# Patient Record
Sex: FEMALE | Race: WHITE | NOT HISPANIC OR LATINO | Employment: PART TIME | URBAN - METROPOLITAN AREA
[De-identification: names, ages, dates, MRNs, and addresses within clinical notes are randomized per-mention and may not be internally consistent; named-entity substitution may affect disease eponyms.]

---

## 2017-07-05 DIAGNOSIS — C50.919 BREAST CANCER (H): Primary | ICD-10-CM

## 2017-07-07 ENCOUNTER — MEDICAL CORRESPONDENCE (OUTPATIENT)
Dept: HEALTH INFORMATION MANAGEMENT | Facility: CLINIC | Age: 57
End: 2017-07-07

## 2017-07-07 ENCOUNTER — TRANSFERRED RECORDS (OUTPATIENT)
Dept: HEALTH INFORMATION MANAGEMENT | Facility: CLINIC | Age: 57
End: 2017-07-07

## 2017-07-10 ENCOUNTER — HOSPITAL ENCOUNTER (OUTPATIENT)
Dept: CARDIOLOGY | Facility: CLINIC | Age: 57
Discharge: HOME | End: 2017-07-10
Attending: INTERNAL MEDICINE | Admitting: INTERNAL MEDICINE
Payer: OTHER GOVERNMENT

## 2017-07-10 DIAGNOSIS — C50.919 BREAST CANCER (H): ICD-10-CM

## 2017-07-10 PROCEDURE — 40000264 ECHO COMPLETE WITH OPTISON

## 2017-07-10 PROCEDURE — 93306 TTE W/DOPPLER COMPLETE: CPT | Mod: 26 | Performed by: INTERNAL MEDICINE

## 2017-07-10 PROCEDURE — 25500064 ZZH RX 255 OP 636: Performed by: INTERNAL MEDICINE

## 2017-07-10 RX ADMIN — HUMAN ALBUMIN MICROSPHERES AND PERFLUTREN 3 ML: 10; .22 INJECTION, SOLUTION INTRAVENOUS at 10:45

## 2017-07-11 ENCOUNTER — HOSPITAL ENCOUNTER (OUTPATIENT)
Facility: CLINIC | Age: 57
Discharge: HOME | End: 2017-07-11
Attending: RADIOLOGY | Admitting: RADIOLOGY
Payer: OTHER GOVERNMENT

## 2017-07-11 ENCOUNTER — APPOINTMENT (OUTPATIENT)
Dept: INTERVENTIONAL RADIOLOGY/VASCULAR | Facility: CLINIC | Age: 57
End: 2017-07-11
Attending: INTERNAL MEDICINE
Payer: OTHER GOVERNMENT

## 2017-07-11 VITALS
OXYGEN SATURATION: 96 % | DIASTOLIC BLOOD PRESSURE: 71 MMHG | HEART RATE: 64 BPM | SYSTOLIC BLOOD PRESSURE: 135 MMHG | TEMPERATURE: 98.2 F | RESPIRATION RATE: 14 BRPM

## 2017-07-11 DIAGNOSIS — C80.1 CANCER (H): ICD-10-CM

## 2017-07-11 LAB
APTT PPP: 28 SEC (ref 22–37)
ERYTHROCYTE [DISTWIDTH] IN BLOOD BY AUTOMATED COUNT: 14.1 % (ref 10–15)
HCT VFR BLD AUTO: 36.1 % (ref 35–47)
HGB BLD-MCNC: 11.5 G/DL (ref 11.7–15.7)
INR PPP: 0.91 (ref 0.86–1.14)
MCH RBC QN AUTO: 27.3 PG (ref 26.5–33)
MCHC RBC AUTO-ENTMCNC: 31.9 G/DL (ref 31.5–36.5)
MCV RBC AUTO: 86 FL (ref 78–100)
PLATELET # BLD AUTO: 220 10E9/L (ref 150–450)
RBC # BLD AUTO: 4.22 10E12/L (ref 3.8–5.2)
WBC # BLD AUTO: 4.9 10E9/L (ref 4–11)

## 2017-07-11 PROCEDURE — 36415 COLL VENOUS BLD VENIPUNCTURE: CPT | Performed by: RADIOLOGY

## 2017-07-11 PROCEDURE — 99153 MOD SED SAME PHYS/QHP EA: CPT

## 2017-07-11 PROCEDURE — 76937 US GUIDE VASCULAR ACCESS: CPT

## 2017-07-11 PROCEDURE — 25000128 H RX IP 250 OP 636: Performed by: RADIOLOGY

## 2017-07-11 PROCEDURE — 99152 MOD SED SAME PHYS/QHP 5/>YRS: CPT

## 2017-07-11 PROCEDURE — 85027 COMPLETE CBC AUTOMATED: CPT | Performed by: RADIOLOGY

## 2017-07-11 PROCEDURE — 25000128 H RX IP 250 OP 636

## 2017-07-11 PROCEDURE — C1788 PORT, INDWELLING, IMP: HCPCS

## 2017-07-11 PROCEDURE — 85730 THROMBOPLASTIN TIME PARTIAL: CPT | Performed by: RADIOLOGY

## 2017-07-11 PROCEDURE — 85610 PROTHROMBIN TIME: CPT | Performed by: RADIOLOGY

## 2017-07-11 PROCEDURE — 36561 INSERT TUNNELED CV CATH: CPT

## 2017-07-11 PROCEDURE — 27210820 ZZH WOUND GLUE CR3

## 2017-07-11 PROCEDURE — 77001 FLUOROGUIDE FOR VEIN DEVICE: CPT

## 2017-07-11 PROCEDURE — 27210908 ZZH NEEDLE CR4

## 2017-07-11 PROCEDURE — 27210904 ZZH KIT CR6

## 2017-07-11 RX ORDER — FENTANYL CITRATE 50 UG/ML
INJECTION, SOLUTION INTRAMUSCULAR; INTRAVENOUS
Status: DISCONTINUED
Start: 2017-07-11 | End: 2017-07-11 | Stop reason: HOSPADM

## 2017-07-11 RX ORDER — CEFAZOLIN SODIUM 2 G/100ML
2 INJECTION, SOLUTION INTRAVENOUS
Status: COMPLETED | OUTPATIENT
Start: 2017-07-11 | End: 2017-07-11

## 2017-07-11 RX ORDER — CEFAZOLIN SODIUM 2 G/100ML
INJECTION, SOLUTION INTRAVENOUS
Status: DISCONTINUED
Start: 2017-07-11 | End: 2017-07-11 | Stop reason: HOSPADM

## 2017-07-11 RX ORDER — HEPARIN SODIUM 1000 [USP'U]/ML
INJECTION, SOLUTION INTRAVENOUS; SUBCUTANEOUS
Status: COMPLETED
Start: 2017-07-11 | End: 2017-07-11

## 2017-07-11 RX ORDER — LIDOCAINE HYDROCHLORIDE 10 MG/ML
1-30 INJECTION, SOLUTION EPIDURAL; INFILTRATION; INTRACAUDAL; PERINEURAL
Status: COMPLETED | OUTPATIENT
Start: 2017-07-11 | End: 2017-07-11

## 2017-07-11 RX ORDER — FLUMAZENIL 0.1 MG/ML
0.2 INJECTION, SOLUTION INTRAVENOUS
Status: DISCONTINUED | OUTPATIENT
Start: 2017-07-11 | End: 2017-07-11 | Stop reason: HOSPADM

## 2017-07-11 RX ORDER — FENTANYL CITRATE 50 UG/ML
25-50 INJECTION, SOLUTION INTRAMUSCULAR; INTRAVENOUS EVERY 5 MIN PRN
Status: DISCONTINUED | OUTPATIENT
Start: 2017-07-11 | End: 2017-07-11 | Stop reason: HOSPADM

## 2017-07-11 RX ORDER — LIDOCAINE 40 MG/G
CREAM TOPICAL
Status: DISCONTINUED | OUTPATIENT
Start: 2017-07-11 | End: 2017-07-11 | Stop reason: HOSPADM

## 2017-07-11 RX ORDER — NALOXONE HYDROCHLORIDE 0.4 MG/ML
.1-.4 INJECTION, SOLUTION INTRAMUSCULAR; INTRAVENOUS; SUBCUTANEOUS
Status: DISCONTINUED | OUTPATIENT
Start: 2017-07-11 | End: 2017-07-11 | Stop reason: HOSPADM

## 2017-07-11 RX ADMIN — FENTANYL CITRATE 100 MCG: 50 INJECTION INTRAMUSCULAR; INTRAVENOUS at 10:10

## 2017-07-11 RX ADMIN — MIDAZOLAM 2 MG: 1 INJECTION INTRAMUSCULAR; INTRAVENOUS at 10:08

## 2017-07-11 RX ADMIN — HEPARIN SODIUM 2000 UNITS: 1000 INJECTION, SOLUTION INTRAVENOUS; SUBCUTANEOUS at 10:16

## 2017-07-11 RX ADMIN — LIDOCAINE HYDROCHLORIDE 200 MG: 10 INJECTION, SOLUTION EPIDURAL; INFILTRATION; INTRACAUDAL; PERINEURAL at 10:10

## 2017-07-11 RX ADMIN — CEFAZOLIN SODIUM 2 G: 2 INJECTION, SOLUTION INTRAVENOUS at 09:43

## 2017-07-11 NOTE — PROCEDURES
RADIOLOGY PROCEDURE NOTE  Patient name: Cheryl Patel  MRN: [id number]  : 1960    Pre-procedure diagnosis: Breast cancer, Right.  Post-procedure diagnosis: Same    Procedure Date/Time: 2017  10:44 AM  Procedure: Right IJ port and catheter placement, tip at RA/SVC junction.  Heparinized.  Ready for immediate use.  No complications.  Estimated blood loss: 5 ml  Specimen(s) collected with description: None  The patient tolerated the procedure well with no immediate complications.  Significant findings:   Please see above.    See imaging dictation for procedural details.    Provider name: Beltran Pittman  Assistant(s):None

## 2017-07-11 NOTE — IP AVS SNAPSHOT
MRN:[id number]                      After Visit Summary   7/11/2017    Cheryl Patel    MRN: [id number]           Visit Information        Department      7/11/2017  8:32 AM [facility]          Review of your medicines      UNREVIEWED medicines. Ask your doctor about these medicines        Dose / Directions    LEXAPRO PO        Dose:  20 mg   Take 20 mg by mouth   Refills:  0       LIPITOR PO        Dose:  20 mg   Take 20 mg by mouth   Refills:  0       LISINOPRIL PO        Dose:  20 mg   Take 20 mg by mouth   Refills:  0       METFORMIN HCL PO        Dose:  1000 mg   Take 1,000 mg by mouth 2 times daily (with meals)   Refills:  0                Protect others around you: Learn how to safely use, store and throw away your medicines at www.disposemymeds.org.         Follow-ups after your visit        Your next 10 appointments already scheduled     Jul 14, 2017 12:45 PM CDT   Cardio-Oncology New with Kareem Swenson MD   [facility] PHYSICIANS [facility] [place] [facility] [facility]    [address] [place] [id number] [phone]               Care Instructions        Further instructions from your care team         Going Home after Your Port Is Inserted  _______________________________________    Patient Name: Cheryl Patel  Today's Date: July 11, 2017    The doctor who inserted your port was: Dr. Pittman at [facility]     Have your port site and/or neck wound checked on:  Next clinic appointment     Your port may be accessed right away. A nurse needs to flush your port every 30 days or after each use.     When you get home:    You should have an adult with you for the first 6 hours.    No driving or drinking alcohol until tomorrow. You may still have side effects from the medicine you received today (you may feel drowsy, unsteady or forgetful).     You may go back to your regular diet today.     If you take aspirin or  "Plavix, you may begin taking it again tomorrow. You may restart all other medicines today. Use pain medicine as directed.    Avoid heavy lifting or the overuse of your shoulder for three days.    Caring for the port site and/or neck wound:    Keep the port site clean and dry. Cover the site with plastic before taking a shower. Do this until the site has healed.     Keep the bandage on your port site for three days. Change it if it gets wet or dirty. After three days, you may use Band-Aids until the wound has healed.    For a neck wound, leave the tape on for three days. You may cover it with a Band-Aid for comfort.     If you have oozing or bleeding from the port site or from the cut in your neck:     Put direct pressure on the wound for 5 to 10 minutes with a gauze pad.  If you still have bleeding after 10 minutes, call your doctor.    If you are bleeding a lot and can't control it with direct pressure, call 911.    Call your doctor at  Oncology clinic if you have:    Bleeding from a wound after 10 minutes of direct pressure.    Swelling in your neck or over your port site.    Signs of infection: a fever over 100 F (37.8 C) under the tongue; the site is red, tender or draining.       Additional Information About Your Visit        TrackerSphere[facility]ier Information     FOBO lets you send messages to your doctor, view your test results, renew your prescriptions, schedule appointments and more. To sign up, go to [email or web address] . Click on \"Log in\" on the left side of the screen, which will take you to the Welcome page. Then click on \"Sign up Now\" on the right side of the page.     You will be asked to enter the access code listed below, as well as some personal information. Please follow the directions to create your username and password.     Your access code is: [email or web address]  Expires: 10/9/2017 10:58 AM     Your access code will  in 90 days. If you need help or a new code, please call your [facility] clinic or " [phone].        Care EveryWhere ID     This is your Care EveryWhere ID. This could be used by other organizations to access your [facility] medical records  [id number]        Your Vitals Were     Blood Pressure Pulse Temperature Respirations Pulse Oximetry       128/78 (BP Location: Left arm) 62 98.2  F (36.8  C) (Temporal) 15 95%        Primary Care Provider Office Phone # Fax #    Ina Pinzon [phone]      Equal Access to Services     [facility] : Hadii aad ku hadasho Soomaali, waaxda luqadaha, qaybta kaalmada adeegyada, migdalia gaffney . So [facility] [phone].    ATENCIÓN: Si habla español, tiene a zaidi disposición servicios gratuitos de asistencia lingüística. Llame al [phone].    We comply with applicable federal civil rights laws and [place] laws. We do not discriminate on the basis of race, color, national origin, age, disability sex, sexual orientation or gender identity.            Thank you!     Thank you for choosing [facility] for your care. Our goal is always to provide you with excellent care. Hearing back from our patients is one way we can continue to improve our services. Please take a few minutes to complete the written survey that you may receive in the mail after you visit. If you would like to speak to someone directly about your visit please contact Patient Relations at [phone]. Thank you!               Medication List: This is a list of all your medications and when to take them. Check marks below indicate your daily home schedule. Keep this list as a reference.      Medications           Morning Afternoon Evening Bedtime As Needed    LEXAPRO PO   Take 20 mg by mouth                                LIPITOR PO   Take 20 mg by mouth                                LISINOPRIL PO   Take 20 mg by mouth                                METFORMIN HCL PO   Take 1,000 mg by mouth 2 times daily (with meals)

## 2017-07-11 NOTE — IP AVS SNAPSHOT
[facility]    [address]    [facility] [place] [address]    Phone:  [phone]                                       After Visit Summary   7/11/2017    Cheryl Patel    MRN: [id number]           After Visit Summary Signature Page     I have received my discharge instructions, and my questions have been answered. I have discussed any challenges I see with this plan with the nurse or doctor.    ..........................................................................................................................................  Patient/Patient Representative Signature      ..........................................................................................................................................  Patient Representative Print Name and Relationship to Patient    ..................................................               ................................................  Date                                            Time    ..........................................................................................................................................  Reviewed by Signature/Title    ...................................................              ..............................................  Date                                                            Time

## 2017-07-11 NOTE — DISCHARGE INSTRUCTIONS
Going Home after Your Port Is Inserted  _______________________________________    Patient Name: Cheryl Patel  Today's Date: July 11, 2017    The doctor who inserted your port was: Dr. Pittman at [facility]     Have your port site and/or neck wound checked on:  Next clinic appointment     Your port may be accessed right away. A nurse needs to flush your port every 30 days or after each use.     When you get home:    You should have an adult with you for the first 6 hours.    No driving or drinking alcohol until tomorrow. You may still have side effects from the medicine you received today (you may feel drowsy, unsteady or forgetful).     You may go back to your regular diet today.     If you take aspirin or Plavix, you may begin taking it again tomorrow. You may restart all other medicines today. Use pain medicine as directed.    Avoid heavy lifting or the overuse of your shoulder for three days.    Caring for the port site and/or neck wound:    Keep the port site clean and dry. Cover the site with plastic before taking a shower. Do this until the site has healed.     Keep the bandage on your port site for three days. Change it if it gets wet or dirty. After three days, you may use Band-Aids until the wound has healed.    For a neck wound, leave the tape on for three days. You may cover it with a Band-Aid for comfort.     If you have oozing or bleeding from the port site or from the cut in your neck:     Put direct pressure on the wound for 5 to 10 minutes with a gauze pad.  If you still have bleeding after 10 minutes, call your doctor.    If you are bleeding a lot and can't control it with direct pressure, call 911.    Call your doctor at  Oncology clinic if you have:    Bleeding from a wound after 10 minutes of direct pressure.    Swelling in your neck or over your port site.    Signs of infection: a fever over 100 F (37.8 C) under the tongue; the site is red, tender or draining.

## 2017-07-12 ENCOUNTER — TRANSFERRED RECORDS (OUTPATIENT)
Dept: HEALTH INFORMATION MANAGEMENT | Facility: CLINIC | Age: 57
End: 2017-07-12

## 2017-07-14 ENCOUNTER — OFFICE VISIT (OUTPATIENT)
Dept: CARDIOLOGY | Facility: CLINIC | Age: 57
End: 2017-07-14
Payer: OTHER GOVERNMENT

## 2017-07-14 VITALS
HEIGHT: 67 IN | DIASTOLIC BLOOD PRESSURE: 76 MMHG | OXYGEN SATURATION: 99 % | HEART RATE: 78 BPM | WEIGHT: 165 LBS | SYSTOLIC BLOOD PRESSURE: 138 MMHG | BODY MASS INDEX: 25.9 KG/M2

## 2017-07-14 DIAGNOSIS — Z92.21 STATUS POST CHEMOTHERAPY: Primary | ICD-10-CM

## 2017-07-14 DIAGNOSIS — E78.5 HYPERLIPIDEMIA LDL GOAL <100: ICD-10-CM

## 2017-07-14 DIAGNOSIS — F33.0 MILD RECURRENT MAJOR DEPRESSION (H): ICD-10-CM

## 2017-07-14 DIAGNOSIS — I10 BENIGN ESSENTIAL HYPERTENSION: ICD-10-CM

## 2017-07-14 PROCEDURE — 99203 OFFICE O/P NEW LOW 30 MIN: CPT | Performed by: INTERNAL MEDICINE

## 2017-07-14 RX ORDER — ATORVASTATIN CALCIUM 20 MG/1
20 TABLET, FILM COATED ORAL DAILY
Qty: 90 TABLET | Refills: 3 | Status: SHIPPED | OUTPATIENT
Start: 2017-07-14 | End: 2018-04-30

## 2017-07-14 RX ORDER — ESCITALOPRAM OXALATE 20 MG/1
20 TABLET ORAL DAILY
Qty: 90 TABLET | Refills: 3 | Status: SHIPPED | OUTPATIENT
Start: 2017-07-14 | End: 2018-01-08

## 2017-07-14 RX ORDER — LISINOPRIL 20 MG/1
20 TABLET ORAL DAILY
Qty: 90 TABLET | Refills: 3 | Status: ON HOLD | OUTPATIENT
Start: 2017-07-14 | End: 2017-07-27

## 2017-07-14 NOTE — LETTER
"7/14/2017    Ina Pinzon MD  [facility]   [address]  [facility] [address]    RE: Cheryl Patel       Dear Colleague,    I had the pleasure of seeing Cheryl Patel in the [facility].    Cardiology Consultation      Cheryl Patel MRN# [id number]   YOB: 1960 Age: 56 year old   Date of Visit 07/14/2017     Reason for consult:  breast cancer, cardio oncology            Assessment and Plan:     1. Cardio oncology, Adriamycin planned    Discussed the rationale for cardioprotection.  Patient is already on atorvastatin.  Refilled this medication for her.    Will check echocardiogram in one month.  Continue on current medications.  Discussed warning signs and symptoms of cardiomyopathy.      This note was transcribed using electronic voice recognition software, typographical errors may be present.                Chief Complaint:   New Patient           History of Present Illness:   This patient is a very pleasant 56 year old female patient of Dr. Ina Pinzon who has just moved from [place] to have access to better care for her breast cancer.  She had mastectomy in [place] and is going to get her chemotherapy including Adriamycin for four cycles every other week before going to Taxol therapy.  Her prechemotherapy echocardiogram is normal.  She does have diabetes but denies any exertional chest discomfort or dyspnea on exertion.  She did have positive lymph nodes on her axillary dissection.  She has depression but feels like she is coping pretty well.  Her  works for [employer] and they have been very good about allowing him flexibility and moving his work from [place] back to [place].           Physical Exam:     Vitals: /76  Pulse 78  Ht 1.702 m (5' 7\")  Wt 74.8 kg (165 lb)  SpO2 99%  BMI 25.84 kg/m2  Constitutional:  cooperative, alert and oriented, well developed, well nourished, in no acute distress    "     Skin:  warm and dry to the touch, no apparent skin lesions or masses noted        Head:  normocephalic, no masses or lesions        Eyes:  pupils equal and round, conjunctivae and lids unremarkable, sclera white, no xanthalasma, EOMS intact, no nystagmus        ENT:  no pallor or cyanosis, dentition good        Neck:  JVP normal        Chest:  normal breath sounds, clear to auscultation, normal A-P diameter, normal symmetry, normal respiratory excursion, no use of accessory muscles        Cardiac: regular rhythm;normal S1 and S2;no murmurs, gallops or rubs detected                  Abdomen:      benign    Extremities and Back:  no deformities, clubbing, cyanosis, erythema observed;no edema        Neurological:  affect appropriate, oriented to time, person and place;no gross motor deficits                    Past Medical History:   I have reviewed this patient's past medical history  Past Medical History:   Diagnosis Date     Cancer (H)      Diabetes (H)              Past Surgical History:   I have reviewed this patient's past surgical history  Past Surgical History:   Procedure Laterality Date     MASTECTOMY                 Social History:   I have reviewed this patient's social history  Social History   Substance Use Topics     Smoking status: Former Smoker     Smokeless tobacco: Not on file     Alcohol use Yes      Comment: socially             Family History:   I have reviewed this patient's family history  Family History   Problem Relation Age of Onset     Breast Cancer No family hx of              Allergies:   No Known Allergies          Medications:   I have reviewed this patient's current medications  Current Outpatient Prescriptions   Medication Sig Dispense Refill     atorvastatin (LIPITOR) 20 MG tablet Take 1 tablet (20 mg) by mouth daily 90 tablet 3     escitalopram (LEXAPRO) 20 MG tablet Take 1 tablet (20 mg) by mouth daily 90 tablet 3     lisinopril (PRINIVIL/ZESTRIL) 20 MG tablet Take 1 tablet (20  mg) by mouth daily 90 tablet 3     METFORMIN HCL PO Take 1,000 mg by mouth 2 times daily (with meals)       [DISCONTINUED] LISINOPRIL PO Take 20 mg by mouth       [DISCONTINUED] Atorvastatin Calcium (LIPITOR PO) Take 20 mg by mouth       [DISCONTINUED] Escitalopram Oxalate (LEXAPRO PO) Take 20 mg by mouth                 Review of Systems:     Review of Systems:  Skin:  Negative     Eyes:  Negative    ENT:  Negative    Respiratory:  Negative    Cardiovascular:  Negative    Gastroenterology: Negative    Genitourinary:  Negative    Musculoskeletal:  Negative    Neurologic:  Negative    Psychiatric:  Positive for excessive stress  Heme/Lymph/Imm:  Negative    Endocrine:  Positive for diabetes                     Data:   All laboratory data reviewed  No results found for: CHOL  No results found for: HDL  No results found for: LDL  No results found for: TRIG  No results found for: CHOLHDLRATIO  No results found for: TSH  Last Basic Metabolic Panel:  No results found for: NA   No results found for: POTASSIUM  No results found for: CHLORIDE  No results found for: PRO  No results found for: CO2  No results found for: BUN  No results found for: CR  No results found for: GLC  Lab Results   Component Value Date    WBC 4.9 07/11/2017     Lab Results   Component Value Date    RBC 4.22 07/11/2017     Lab Results   Component Value Date    HGB 11.5 07/11/2017     Lab Results   Component Value Date    HCT 36.1 07/11/2017     Lab Results   Component Value Date    MCV 86 07/11/2017     Lab Results   Component Value Date    MCH 27.3 07/11/2017     Lab Results   Component Value Date    MCHC 31.9 07/11/2017     Lab Results   Component Value Date    RDW 14.1 07/11/2017     Lab Results   Component Value Date     07/11/2017     Thank you for allowing me to participate in the care of your patient.    Sincerely,     Kareem Swenson MD     [facility]

## 2017-07-14 NOTE — PROGRESS NOTES
"Cardiology Consultation      Cheryl Patel MRN# [id number]   YOB: 1960 Age: 56 year old   Date of Visit 07/14/2017     Reason for consult:  breast cancer, cardio oncology            Assessment and Plan:     1. Cardio oncology, Adriamycin planned    Discussed the rationale for cardioprotection.  Patient is already on atorvastatin.  Refilled this medication for her.    Will check echocardiogram in one month.  Continue on current medications.  Discussed warning signs and symptoms of cardiomyopathy.      This note was transcribed using electronic voice recognition software, typographical errors may be present.                Chief Complaint:   New Patient           History of Present Illness:   This patient is a very pleasant 56 year old female patient of Dr. Ina Pinzon who has just moved from [place] to have access to better care for her breast cancer.  She had mastectomy in [place] and is going to get her chemotherapy including Adriamycin for four cycles every other week before going to Taxol therapy.  Her prechemotherapy echocardiogram is normal.  She does have diabetes but denies any exertional chest discomfort or dyspnea on exertion.  She did have positive lymph nodes on her axillary dissection.  She has depression but feels like she is coping pretty well.  Her  works for [employer] and they have been very good about allowing him flexibility and moving his work from [place] back to [place].           Physical Exam:     Vitals: /76  Pulse 78  Ht 1.702 m (5' 7\")  Wt 74.8 kg (165 lb)  SpO2 99%  BMI 25.84 kg/m2  Constitutional:  cooperative, alert and oriented, well developed, well nourished, in no acute distress        Skin:  warm and dry to the touch, no apparent skin lesions or masses noted        Head:  normocephalic, no masses or lesions        Eyes:  pupils equal and round, conjunctivae and lids unremarkable, sclera white, no xanthalasma, EOMS intact, no nystagmus    "     ENT:  no pallor or cyanosis, dentition good        Neck:  JVP normal        Chest:  normal breath sounds, clear to auscultation, normal A-P diameter, normal symmetry, normal respiratory excursion, no use of accessory muscles        Cardiac: regular rhythm;normal S1 and S2;no murmurs, gallops or rubs detected                  Abdomen:      benign    Extremities and Back:  no deformities, clubbing, cyanosis, erythema observed;no edema        Neurological:  affect appropriate, oriented to time, person and place;no gross motor deficits                    Past Medical History:   I have reviewed this patient's past medical history  Past Medical History:   Diagnosis Date     Cancer (H)      Diabetes (H)              Past Surgical History:   I have reviewed this patient's past surgical history  Past Surgical History:   Procedure Laterality Date     MASTECTOMY                 Social History:   I have reviewed this patient's social history  Social History   Substance Use Topics     Smoking status: Former Smoker     Smokeless tobacco: Not on file     Alcohol use Yes      Comment: socially             Family History:   I have reviewed this patient's family history  Family History   Problem Relation Age of Onset     Breast Cancer No family hx of              Allergies:   No Known Allergies          Medications:   I have reviewed this patient's current medications  Current Outpatient Prescriptions   Medication Sig Dispense Refill     atorvastatin (LIPITOR) 20 MG tablet Take 1 tablet (20 mg) by mouth daily 90 tablet 3     escitalopram (LEXAPRO) 20 MG tablet Take 1 tablet (20 mg) by mouth daily 90 tablet 3     lisinopril (PRINIVIL/ZESTRIL) 20 MG tablet Take 1 tablet (20 mg) by mouth daily 90 tablet 3     METFORMIN HCL PO Take 1,000 mg by mouth 2 times daily (with meals)       [DISCONTINUED] LISINOPRIL PO Take 20 mg by mouth       [DISCONTINUED] Atorvastatin Calcium (LIPITOR PO) Take 20 mg by mouth       [DISCONTINUED]  Escitalopram Oxalate (LEXAPRO PO) Take 20 mg by mouth                 Review of Systems:     Review of Systems:  Skin:  Negative     Eyes:  Negative    ENT:  Negative    Respiratory:  Negative    Cardiovascular:  Negative    Gastroenterology: Negative    Genitourinary:  Negative    Musculoskeletal:  Negative    Neurologic:  Negative    Psychiatric:  Positive for excessive stress  Heme/Lymph/Imm:  Negative    Endocrine:  Positive for diabetes                     Data:   All laboratory data reviewed  No results found for: CHOL  No results found for: HDL  No results found for: LDL  No results found for: TRIG  No results found for: CHOLHDLRATIO  No results found for: TSH  Last Basic Metabolic Panel:  No results found for: NA   No results found for: POTASSIUM  No results found for: CHLORIDE  No results found for: PRO  No results found for: CO2  No results found for: BUN  No results found for: CR  No results found for: GLC  Lab Results   Component Value Date    WBC 4.9 07/11/2017     Lab Results   Component Value Date    RBC 4.22 07/11/2017     Lab Results   Component Value Date    HGB 11.5 07/11/2017     Lab Results   Component Value Date    HCT 36.1 07/11/2017     Lab Results   Component Value Date    MCV 86 07/11/2017     Lab Results   Component Value Date    MCH 27.3 07/11/2017     Lab Results   Component Value Date    MCHC 31.9 07/11/2017     Lab Results   Component Value Date    RDW 14.1 07/11/2017     Lab Results   Component Value Date     07/11/2017

## 2017-07-14 NOTE — PATIENT INSTRUCTIONS
We will check another echocardiogram down in [place] in one month to see how the heart is handling the ADRIAMYCIN. Please stay on your current medications including the ATORVASTATIN.     Things to watch for: Shortness of breath, swelling in the ankles, difficulty breathing when lying flat and chest tightness when walking.

## 2017-07-14 NOTE — MR AVS SNAPSHOT
After Visit Summary   7/14/2017    Cheryl Patel    MRN: [id number]           Patient Information     Date Of Birth          1960        Visit Information        Provider Department      7/14/2017 12:45 PM Kareem Swenson MD [facility]        Today's Diagnoses     Status post chemotherapy    -  1    Hyperlipidemia LDL goal <100        Mild recurrent major depression (H)        Benign essential hypertension          Care Instructions    We will check another echocardiogram down in [place] in one month to see how the heart is handling the ADRIAMYCIN. Please stay on your current medications including the ATORVASTATIN.     Things to watch for: Shortness of breath, swelling in the ankles, difficulty breathing when lying flat and chest tightness when walking.          Follow-ups after your visit        Your next 10 appointments already scheduled     Jul 17, 2017  7:00 AM CDT   SHORT with Archana Chen MD   [facility]    [address]  [facility] [address]   [phone]              Future tests that were ordered for you today     Open Future Orders        Priority Expected Expires Ordered    Echocardiogram Limited Routine 7/21/2017 7/14/2018 7/14/2017            Who to contact     If you have questions or need follow up information about today's clinic visit or your schedule please contact [facility] directly at [phone].  Normal or non-critical lab and imaging results will be communicated to you by MyChart, letter or phone within 4 business days after the clinic has received the results. If you do not hear from us within 7 days, please contact the clinic through MyChart or phone. If you have a critical or abnormal lab result, we will notify you by phone as soon as possible.  Submit refill requests through [employer] or call your  "pharmacy and they will forward the refill request to us. Please allow 3 business days for your refill to be completed.          Additional Information About Your Visit        MyChart Information     [employer] lets you send messages to your doctor, view your test results, renew your prescriptions, schedule appointments and more. To sign up, go to [email or web address] . Click on \"Log in\" on the left side of the screen, which will take you to the Welcome page. Then click on \"Sign up Now\" on the right side of the page.     You will be asked to enter the access code listed below, as well as some personal information. Please follow the directions to create your username and password.     Your access code is: [email or web address]  Expires: 10/9/2017 10:58 AM     Your access code will  in 90 days. If you need help or a new code, please call your [place] clinic or [phone].        Care EveryWhere ID     This is your Care EveryWhere ID. This could be used by other organizations to access your [place] medical records  [id number]        Your Vitals Were     Pulse Height Pulse Oximetry BMI (Body Mass Index)          78 1.702 m (5' 7\") 99% 25.84 kg/m2         Blood Pressure from Last 3 Encounters:   17 138/76   17 135/71    Weight from Last 3 Encounters:   17 74.8 kg (165 lb)                 Today's Medication Changes          These changes are accurate as of: 17 12:58 PM.  If you have any questions, ask your nurse or doctor.               These medicines have changed or have updated prescriptions.        Dose/Directions    atorvastatin 20 MG tablet   Commonly known as:  LIPITOR   This may have changed:    - medication strength  - when to take this   Used for:  Hyperlipidemia LDL goal <100   Changed by:  Kareem Swenson MD        Dose:  20 mg   Take 1 tablet (20 mg) by mouth daily   Quantity:  90 tablet   Refills:  3       escitalopram 20 MG tablet   Commonly known as:  LEXAPRO   This may have " changed:    - medication strength  - when to take this   Used for:  Mild recurrent major depression (H)   Changed by:  Kareem Swenson MD        Dose:  20 mg   Take 1 tablet (20 mg) by mouth daily   Quantity:  90 tablet   Refills:  3       lisinopril 20 MG tablet   Commonly known as:  PRINIVIL/ZESTRIL   This may have changed:    - medication strength  - when to take this   Used for:  Benign essential hypertension   Changed by:  Kareem Swenson MD        Dose:  20 mg   Take 1 tablet (20 mg) by mouth daily   Quantity:  90 tablet   Refills:  3            Where to get your medicines      These medications were sent to [facility] [place] [id number] [place] [address]  [address] [place] [address]     Phone:  [phone]     atorvastatin 20 MG tablet    escitalopram 20 MG tablet    lisinopril 20 MG tablet                Primary Care Provider Office Phone # Fax #    Ina Pinzon [phone]       [facility]  [address] [place] [address]        Equal Access to Services     [facility]: Hadii aad ku hadasho Soomaali, waaxda luqadaha, qaybta kaalmada adeegyada, waxay idiin hayaan bandar gaffney . So [facility] [phone].    ATENCIÓN: Si habla español, tiene a zaidi disposición servicios gratuitos de asistencia lingüística. Llame al [phone].    We comply with applicable federal civil rights laws and [place] laws. We do not discriminate on the basis of race, color, national origin, age, disability sex, sexual orientation or gender identity.            Thank you!     Thank you for choosing [facility] [place]  for your care. Our goal is always to provide you with excellent care. Hearing back from our patients is one way we can continue to improve our services. Please take a few minutes to complete the written survey that you may receive in the mail after your visit with us. Thank you!              Your Updated Medication List - Protect others around you: Learn how to safely use, store and throw away your medicines at www.disposemymeds.org.          This list is accurate as of: 7/14/17 12:58 PM.  Always use your most recent med list.                   Brand Name Dispense Instructions for use Diagnosis    atorvastatin 20 MG tablet    LIPITOR    90 tablet    Take 1 tablet (20 mg) by mouth daily    Hyperlipidemia LDL goal <100       escitalopram 20 MG tablet    LEXAPRO    90 tablet    Take 1 tablet (20 mg) by mouth daily    Mild recurrent major depression (H)       lisinopril 20 MG tablet    PRINIVIL/ZESTRIL    90 tablet    Take 1 tablet (20 mg) by mouth daily    Benign essential hypertension       METFORMIN HCL PO      Take 1,000 mg by mouth 2 times daily (with meals)

## 2017-07-18 ENCOUNTER — TRANSFERRED RECORDS (OUTPATIENT)
Dept: HEALTH INFORMATION MANAGEMENT | Facility: CLINIC | Age: 57
End: 2017-07-18

## 2017-07-25 ENCOUNTER — HOSPITAL ENCOUNTER (INPATIENT)
Facility: CLINIC | Age: 57
LOS: 2 days | Discharge: HOME | DRG: 810 | End: 2017-07-27
Attending: EMERGENCY MEDICINE | Admitting: INTERNAL MEDICINE
Payer: OTHER GOVERNMENT

## 2017-07-25 ENCOUNTER — TRANSFERRED RECORDS (OUTPATIENT)
Dept: HEALTH INFORMATION MANAGEMENT | Facility: CLINIC | Age: 57
End: 2017-07-25

## 2017-07-25 ENCOUNTER — APPOINTMENT (OUTPATIENT)
Dept: GENERAL RADIOLOGY | Facility: CLINIC | Age: 57
DRG: 810 | End: 2017-07-25
Attending: EMERGENCY MEDICINE
Payer: OTHER GOVERNMENT

## 2017-07-25 DIAGNOSIS — R50.81 NEUTROPENIC FEVER (H): ICD-10-CM

## 2017-07-25 DIAGNOSIS — D70.9 NEUTROPENIC FEVER (H): ICD-10-CM

## 2017-07-25 DIAGNOSIS — I10 BENIGN ESSENTIAL HYPERTENSION: ICD-10-CM

## 2017-07-25 LAB
ALBUMIN SERPL-MCNC: 3.2 G/DL (ref 3.4–5)
ALBUMIN UR-MCNC: NEGATIVE MG/DL
ALP SERPL-CCNC: 113 U/L (ref 40–150)
ALT SERPL W P-5'-P-CCNC: 21 U/L (ref 0–50)
ANION GAP SERPL CALCULATED.3IONS-SCNC: 7 MMOL/L (ref 3–14)
APPEARANCE UR: CLEAR
AST SERPL W P-5'-P-CCNC: 9 U/L (ref 0–45)
BASOPHILS # BLD AUTO: 0.1 10E9/L (ref 0–0.2)
BASOPHILS NFR BLD AUTO: 6 %
BILIRUB SERPL-MCNC: 0.4 MG/DL (ref 0.2–1.3)
BILIRUB UR QL STRIP: NEGATIVE
BUN SERPL-MCNC: 8 MG/DL (ref 7–30)
CALCIUM SERPL-MCNC: 9.2 MG/DL (ref 8.5–10.1)
CHLORIDE SERPL-SCNC: 104 MMOL/L (ref 94–109)
CO2 SERPL-SCNC: 27 MMOL/L (ref 20–32)
COLOR UR AUTO: YELLOW
CREAT SERPL-MCNC: 0.56 MG/DL (ref 0.52–1.04)
DEPRECATED S PYO AG THROAT QL EIA: NORMAL
DIFFERENTIAL METHOD BLD: ABNORMAL
EOSINOPHIL # BLD AUTO: 0.1 10E9/L (ref 0–0.7)
EOSINOPHIL NFR BLD AUTO: 8 %
ERYTHROCYTE [DISTWIDTH] IN BLOOD BY AUTOMATED COUNT: 13.3 % (ref 10–15)
GFR SERPL CREATININE-BSD FRML MDRD: ABNORMAL ML/MIN/1.7M2
GLUCOSE BLDC GLUCOMTR-MCNC: 135 MG/DL (ref 70–99)
GLUCOSE SERPL-MCNC: 140 MG/DL (ref 70–99)
GLUCOSE UR STRIP-MCNC: NEGATIVE MG/DL
HCT VFR BLD AUTO: 33.6 % (ref 35–47)
HGB BLD-MCNC: 10.7 G/DL (ref 11.7–15.7)
HGB UR QL STRIP: NEGATIVE
KETONES UR STRIP-MCNC: NEGATIVE MG/DL
LACTATE BLD-SCNC: 2 MMOL/L (ref 0.7–2.1)
LEUKOCYTE ESTERASE UR QL STRIP: NEGATIVE
LYMPHOCYTES # BLD AUTO: 0.7 10E9/L (ref 0.8–5.3)
LYMPHOCYTES NFR BLD AUTO: 52 %
MCH RBC QN AUTO: 27 PG (ref 26.5–33)
MCHC RBC AUTO-ENTMCNC: 31.8 G/DL (ref 31.5–36.5)
MCV RBC AUTO: 85 FL (ref 78–100)
MICRO REPORT STATUS: NORMAL
MONOCYTES # BLD AUTO: 0.3 10E9/L (ref 0–1.3)
MONOCYTES NFR BLD AUTO: 18 %
MUCOUS THREADS #/AREA URNS LPF: PRESENT /LPF
MYELOCYTES # BLD: 0 10E9/L
MYELOCYTES NFR BLD MANUAL: 1 %
NEUTROPHILS # BLD AUTO: 0.2 10E9/L (ref 1.6–8.3)
NEUTROPHILS NFR BLD AUTO: 15 %
NITRATE UR QL: NEGATIVE
PH UR STRIP: 7 PH (ref 5–7)
PLATELET # BLD AUTO: 160 10E9/L (ref 150–450)
PLATELET # BLD EST: NORMAL 10*3/UL
POTASSIUM SERPL-SCNC: 3.9 MMOL/L (ref 3.4–5.3)
PROT SERPL-MCNC: 6.8 G/DL (ref 6.8–8.8)
RBC # BLD AUTO: 3.96 10E12/L (ref 3.8–5.2)
RBC #/AREA URNS AUTO: 1 /HPF (ref 0–2)
RBC MORPH BLD: ABNORMAL
SODIUM SERPL-SCNC: 138 MMOL/L (ref 133–144)
SP GR UR STRIP: 1.02 (ref 1–1.03)
SPECIMEN SOURCE: NORMAL
SQUAMOUS #/AREA URNS AUTO: 3 /HPF (ref 0–1)
URN SPEC COLLECT METH UR: ABNORMAL
UROBILINOGEN UR STRIP-MCNC: 0 MG/DL (ref 0–2)
VARIANT LYMPHS BLD QL SMEAR: PRESENT
WBC # BLD AUTO: 1.4 10E9/L (ref 4–11)
WBC #/AREA URNS AUTO: 1 /HPF (ref 0–2)

## 2017-07-25 PROCEDURE — 87086 URINE CULTURE/COLONY COUNT: CPT | Performed by: EMERGENCY MEDICINE

## 2017-07-25 PROCEDURE — 96365 THER/PROPH/DIAG IV INF INIT: CPT

## 2017-07-25 PROCEDURE — 96367 TX/PROPH/DG ADDL SEQ IV INF: CPT

## 2017-07-25 PROCEDURE — 25000132 ZZH RX MED GY IP 250 OP 250 PS 637: Performed by: EMERGENCY MEDICINE

## 2017-07-25 PROCEDURE — 25000132 ZZH RX MED GY IP 250 OP 250 PS 637: Performed by: INTERNAL MEDICINE

## 2017-07-25 PROCEDURE — 25000125 ZZHC RX 250

## 2017-07-25 PROCEDURE — 71020 XR CHEST 2 VW: CPT

## 2017-07-25 PROCEDURE — 87040 BLOOD CULTURE FOR BACTERIA: CPT | Performed by: EMERGENCY MEDICINE

## 2017-07-25 PROCEDURE — 25000128 H RX IP 250 OP 636

## 2017-07-25 PROCEDURE — 96375 TX/PRO/DX INJ NEW DRUG ADDON: CPT

## 2017-07-25 PROCEDURE — 80053 COMPREHEN METABOLIC PANEL: CPT | Performed by: EMERGENCY MEDICINE

## 2017-07-25 PROCEDURE — 81001 URINALYSIS AUTO W/SCOPE: CPT | Performed by: EMERGENCY MEDICINE

## 2017-07-25 PROCEDURE — 99285 EMERGENCY DEPT VISIT HI MDM: CPT | Mod: 25

## 2017-07-25 PROCEDURE — 00000146 ZZHCL STATISTIC GLUCOSE BY METER IP

## 2017-07-25 PROCEDURE — 83605 ASSAY OF LACTIC ACID: CPT | Performed by: EMERGENCY MEDICINE

## 2017-07-25 PROCEDURE — 96376 TX/PRO/DX INJ SAME DRUG ADON: CPT

## 2017-07-25 PROCEDURE — 25000128 H RX IP 250 OP 636: Performed by: EMERGENCY MEDICINE

## 2017-07-25 PROCEDURE — 99223 1ST HOSP IP/OBS HIGH 75: CPT | Mod: AI | Performed by: INTERNAL MEDICINE

## 2017-07-25 PROCEDURE — 25000128 H RX IP 250 OP 636: Performed by: INTERNAL MEDICINE

## 2017-07-25 PROCEDURE — 87081 CULTURE SCREEN ONLY: CPT | Performed by: EMERGENCY MEDICINE

## 2017-07-25 PROCEDURE — 12000000 ZZH R&B MED SURG/OB

## 2017-07-25 PROCEDURE — 96361 HYDRATE IV INFUSION ADD-ON: CPT

## 2017-07-25 PROCEDURE — 85025 COMPLETE CBC W/AUTO DIFF WBC: CPT | Performed by: EMERGENCY MEDICINE

## 2017-07-25 PROCEDURE — 87880 STREP A ASSAY W/OPTIC: CPT | Performed by: EMERGENCY MEDICINE

## 2017-07-25 RX ORDER — LIDOCAINE 40 MG/G
CREAM TOPICAL
Status: DISCONTINUED | OUTPATIENT
Start: 2017-07-25 | End: 2017-07-27 | Stop reason: HOSPADM

## 2017-07-25 RX ORDER — PROCHLORPERAZINE 25 MG
25 SUPPOSITORY, RECTAL RECTAL EVERY 12 HOURS PRN
Status: DISCONTINUED | OUTPATIENT
Start: 2017-07-25 | End: 2017-07-27 | Stop reason: HOSPADM

## 2017-07-25 RX ORDER — HYDROMORPHONE HYDROCHLORIDE 1 MG/ML
INJECTION, SOLUTION INTRAMUSCULAR; INTRAVENOUS; SUBCUTANEOUS
Status: DISCONTINUED
Start: 2017-07-25 | End: 2017-07-25 | Stop reason: HOSPADM

## 2017-07-25 RX ORDER — NALOXONE HYDROCHLORIDE 0.4 MG/ML
.1-.4 INJECTION, SOLUTION INTRAMUSCULAR; INTRAVENOUS; SUBCUTANEOUS
Status: DISCONTINUED | OUTPATIENT
Start: 2017-07-25 | End: 2017-07-27 | Stop reason: HOSPADM

## 2017-07-25 RX ORDER — ONDANSETRON 2 MG/ML
INJECTION INTRAMUSCULAR; INTRAVENOUS
Status: COMPLETED
Start: 2017-07-25 | End: 2017-07-25

## 2017-07-25 RX ORDER — ACETAMINOPHEN 325 MG/1
650 TABLET ORAL EVERY 4 HOURS PRN
Status: DISCONTINUED | OUTPATIENT
Start: 2017-07-25 | End: 2017-07-27 | Stop reason: HOSPADM

## 2017-07-25 RX ORDER — SODIUM CHLORIDE 9 MG/ML
INJECTION, SOLUTION INTRAVENOUS CONTINUOUS
Status: DISCONTINUED | OUTPATIENT
Start: 2017-07-25 | End: 2017-07-27 | Stop reason: HOSPADM

## 2017-07-25 RX ORDER — HYDROMORPHONE HYDROCHLORIDE 1 MG/ML
0.5 INJECTION, SOLUTION INTRAMUSCULAR; INTRAVENOUS; SUBCUTANEOUS
Status: DISCONTINUED | OUTPATIENT
Start: 2017-07-25 | End: 2017-07-25

## 2017-07-25 RX ORDER — PROCHLORPERAZINE MALEATE 5 MG
5-10 TABLET ORAL EVERY 6 HOURS PRN
Status: DISCONTINUED | OUTPATIENT
Start: 2017-07-25 | End: 2017-07-27 | Stop reason: HOSPADM

## 2017-07-25 RX ORDER — ATORVASTATIN CALCIUM 20 MG/1
20 TABLET, FILM COATED ORAL DAILY
Status: DISCONTINUED | OUTPATIENT
Start: 2017-07-25 | End: 2017-07-27 | Stop reason: HOSPADM

## 2017-07-25 RX ORDER — IBUPROFEN 200 MG
400 TABLET ORAL ONCE
Status: COMPLETED | OUTPATIENT
Start: 2017-07-25 | End: 2017-07-25

## 2017-07-25 RX ORDER — HYDROMORPHONE HCL/0.9% NACL/PF 0.2MG/0.2
0.2 SYRINGE (ML) INTRAVENOUS
Status: DISCONTINUED | OUTPATIENT
Start: 2017-07-25 | End: 2017-07-27 | Stop reason: HOSPADM

## 2017-07-25 RX ORDER — ONDANSETRON 2 MG/ML
4 INJECTION INTRAMUSCULAR; INTRAVENOUS EVERY 6 HOURS PRN
Status: DISCONTINUED | OUTPATIENT
Start: 2017-07-25 | End: 2017-07-27 | Stop reason: HOSPADM

## 2017-07-25 RX ORDER — LIDOCAINE 40 MG/G
CREAM TOPICAL
Status: COMPLETED
Start: 2017-07-25 | End: 2017-07-25

## 2017-07-25 RX ORDER — LANOLIN ALCOHOL/MO/W.PET/CERES
3 CREAM (GRAM) TOPICAL
Status: DISCONTINUED | OUTPATIENT
Start: 2017-07-25 | End: 2017-07-27 | Stop reason: HOSPADM

## 2017-07-25 RX ORDER — LORAZEPAM 2 MG/ML
INJECTION INTRAMUSCULAR
Status: COMPLETED
Start: 2017-07-25 | End: 2017-07-25

## 2017-07-25 RX ORDER — POLYETHYLENE GLYCOL 3350 17 G/17G
17 POWDER, FOR SOLUTION ORAL DAILY PRN
Status: DISCONTINUED | OUTPATIENT
Start: 2017-07-25 | End: 2017-07-27 | Stop reason: HOSPADM

## 2017-07-25 RX ORDER — OXYCODONE HYDROCHLORIDE 5 MG/1
5-10 TABLET ORAL EVERY 4 HOURS PRN
Status: DISCONTINUED | OUTPATIENT
Start: 2017-07-25 | End: 2017-07-27 | Stop reason: HOSPADM

## 2017-07-25 RX ORDER — HYDROMORPHONE HYDROCHLORIDE 1 MG/ML
0.5 INJECTION, SOLUTION INTRAMUSCULAR; INTRAVENOUS; SUBCUTANEOUS ONCE
Status: COMPLETED | OUTPATIENT
Start: 2017-07-25 | End: 2017-07-25

## 2017-07-25 RX ORDER — LORAZEPAM 2 MG/ML
0.5 INJECTION INTRAMUSCULAR ONCE
Status: COMPLETED | OUTPATIENT
Start: 2017-07-25 | End: 2017-07-25

## 2017-07-25 RX ORDER — DEXTROSE MONOHYDRATE 25 G/50ML
25-50 INJECTION, SOLUTION INTRAVENOUS
Status: DISCONTINUED | OUTPATIENT
Start: 2017-07-25 | End: 2017-07-27 | Stop reason: HOSPADM

## 2017-07-25 RX ORDER — METOCLOPRAMIDE HYDROCHLORIDE 5 MG/ML
5 INJECTION INTRAMUSCULAR; INTRAVENOUS ONCE
Status: COMPLETED | OUTPATIENT
Start: 2017-07-25 | End: 2017-07-25

## 2017-07-25 RX ORDER — ESCITALOPRAM OXALATE 20 MG/1
20 TABLET ORAL DAILY
Status: DISCONTINUED | OUTPATIENT
Start: 2017-07-25 | End: 2017-07-27 | Stop reason: HOSPADM

## 2017-07-25 RX ORDER — NICOTINE POLACRILEX 4 MG
15-30 LOZENGE BUCCAL
Status: DISCONTINUED | OUTPATIENT
Start: 2017-07-25 | End: 2017-07-27 | Stop reason: HOSPADM

## 2017-07-25 RX ORDER — ONDANSETRON 4 MG/1
4 TABLET, ORALLY DISINTEGRATING ORAL EVERY 6 HOURS PRN
Status: DISCONTINUED | OUTPATIENT
Start: 2017-07-25 | End: 2017-07-27 | Stop reason: HOSPADM

## 2017-07-25 RX ORDER — LISINOPRIL 10 MG/1
10 TABLET ORAL DAILY
Status: DISCONTINUED | OUTPATIENT
Start: 2017-07-26 | End: 2017-07-27 | Stop reason: HOSPADM

## 2017-07-25 RX ORDER — METOCLOPRAMIDE HYDROCHLORIDE 5 MG/ML
INJECTION INTRAMUSCULAR; INTRAVENOUS
Status: COMPLETED
Start: 2017-07-25 | End: 2017-07-25

## 2017-07-25 RX ADMIN — METOCLOPRAMIDE 5 MG: 5 INJECTION, SOLUTION INTRAMUSCULAR; INTRAVENOUS at 21:18

## 2017-07-25 RX ADMIN — LIDOCAINE 1 EACH: 40 CREAM TOPICAL at 18:55

## 2017-07-25 RX ADMIN — IBUPROFEN 400 MG: 200 TABLET, FILM COATED ORAL at 18:52

## 2017-07-25 RX ADMIN — SODIUM CHLORIDE: 9 INJECTION, SOLUTION INTRAVENOUS at 22:24

## 2017-07-25 RX ADMIN — CEFEPIME HYDROCHLORIDE 2 G: 2 INJECTION, POWDER, FOR SOLUTION INTRAVENOUS at 20:04

## 2017-07-25 RX ADMIN — ONDANSETRON 4 MG: 2 INJECTION INTRAMUSCULAR; INTRAVENOUS at 20:43

## 2017-07-25 RX ADMIN — LORAZEPAM 0.5 MG: 2 INJECTION INTRAMUSCULAR; INTRAVENOUS at 21:16

## 2017-07-25 RX ADMIN — METOCLOPRAMIDE HYDROCHLORIDE 5 MG: 5 INJECTION INTRAMUSCULAR; INTRAVENOUS at 21:18

## 2017-07-25 RX ADMIN — ATORVASTATIN CALCIUM 20 MG: 20 TABLET, FILM COATED ORAL at 22:18

## 2017-07-25 RX ADMIN — Medication 0.5 MG: at 18:53

## 2017-07-25 RX ADMIN — LORAZEPAM 0.5 MG: 2 INJECTION INTRAMUSCULAR at 21:16

## 2017-07-25 RX ADMIN — ONDANSETRON 4 MG: 2 INJECTION INTRAMUSCULAR; INTRAVENOUS at 20:01

## 2017-07-25 RX ADMIN — SODIUM CHLORIDE, POTASSIUM CHLORIDE, SODIUM LACTATE AND CALCIUM CHLORIDE 1000 ML: 600; 310; 30; 20 INJECTION, SOLUTION INTRAVENOUS at 18:54

## 2017-07-25 RX ADMIN — VANCOMYCIN HYDROCHLORIDE 1500 MG: 10 INJECTION, POWDER, LYOPHILIZED, FOR SOLUTION INTRAVENOUS at 20:46

## 2017-07-25 RX ADMIN — ESCITALOPRAM OXALATE 20 MG: 20 TABLET ORAL at 22:18

## 2017-07-25 RX ADMIN — Medication 0.5 MG: at 19:36

## 2017-07-25 ASSESSMENT — ENCOUNTER SYMPTOMS
HEADACHES: 0
BACK PAIN: 1
SORE THROAT: 1
SHORTNESS OF BREATH: 0
DIARRHEA: 0
DYSURIA: 0
RHINORRHEA: 0
APPETITE CHANGE: 1
HEMATURIA: 0
FREQUENCY: 0
VOMITING: 0
DIFFICULTY URINATING: 0
FLANK PAIN: 0
ABDOMINAL PAIN: 0
FEVER: 1
MYALGIAS: 1
CHILLS: 1
COUGH: 0
FATIGUE: 1

## 2017-07-25 NOTE — ED NOTES
Patient had her first chemo for breast CA on Tuesday.  Today she had a fever 100.0 at home.  She had lab work done this morning and her counts are low.  She complains of body aches and a slight sore throat.  She has been fatigued since her chemo treatment.

## 2017-07-25 NOTE — IP AVS SNAPSHOT
MRN:[id number]                      After Visit Summary   7/25/2017    Cheryl Patel    MRN: [id number]           Thank you!     Thank you for choosing [facility] for your care. Our goal is always to provide you with excellent care. Hearing back from our patients is one way we can continue to improve our services. Please take a few minutes to complete the written survey that you may receive in the mail after you visit. If you would like to speak to someone directly about your visit please contact Patient Relations at [phone]. Thank you!          Patient Information     Date Of Birth          1960        Designated Caregiver       Most Recent Value    Caregiver    Will someone help with your care after discharge? no      About your hospital stay     You were admitted on:  July 25, 2017 You last received care in the:  [address] [facility]    You were discharged on:  July 27, 2017        Reason for your hospital stay       Fever in the setting of very low White Blood Cell count (Neutropenic Fever).                  Who to Call     For medical emergencies, please call 911.  For non-urgent questions about your medical care, please call your primary care provider or clinic, [phone]          Attending Provider     Provider Specialty    Rad Blanchard MD Emergency Medicine    Crystal, Braydon Chan MD Internal Medicine       Primary Care Provider Office Phone # Fax #    Ina Pinzon [phone]      After Care Instructions     Activity       Your activity upon discharge: activity as tolerated            Diet       Follow this diet upon discharge: Regular                  Follow-up Appointments     Follow-up and recommended labs and tests        Follow up with Dr. Pinzon as planned prior to next chemo treatment.                  Your next 10 appointments already scheduled     Aug 10, 2017 10:00 AM CDT   Ech Limited with [id number]  "  [facility] ([facility])    [address]  [facility] [id number] [phone]           1.  Please bring or wear a comfortable two-piece outfit. 2.  You may eat, drink and take your normal medicines. 3.  For any questions that cannot be answered, please contact the ordering physician ***Please check-in at the [place] Registration Office located in [address] in the [facility] building. When you are finished registering, please go to [address] and have a seat. The technician will call your name for the test.              Pending Results     Date and Time Order Name Status Description    7/25/2017 1829 Blood culture Preliminary     7/25/2017 1829 Blood culture Preliminary             Statement of Approval     Ordered          07/27/17 1101  I have reviewed and agree with all the recommendations and orders detailed in this document.  EFFECTIVE NOW     Approved and electronically signed by:  Iain Ward MD             Admission Information     Date & Time Provider Department Dept. Phone    7/25/2017 Braydon Rojas MD [place] [address] Medical Surgical [phone]      Your Vitals Were     Blood Pressure Pulse Temperature Respirations Weight Last Period    122/57 (BP Location: Left arm) 66 98  F (36.7  C) (Oral) 18 75.2 kg (165 lb 12.6 oz) 07/18/2017    Pulse Oximetry BMI (Body Mass Index)                94% 25.97 kg/m2          MyChart Information     [employer] lets you send messages to your doctor, view your test results, renew your prescriptions, schedule appointments and more. To sign up, go to [email or web address] . Click on \"Log in\" on the left side of the screen, which will take you to the Welcome page. Then click on \"Sign up Now\" on the right side of the page.     You will be asked to enter the access code listed below, as well as some personal information. Please follow the directions to create your username and " password.     Your access code is: [email or web address]  Expires: 10/9/2017 10:58 AM     Your access code will  in 90 days. If you need help or a new code, please call your [place] clinic or [phone].        Care EveryWhere ID     This is your Care EveryWhere ID. This could be used by other organizations to access your [place] medical records  [id number]        Equal Access to Services     CHILANGO LY : Hadii bhavya bianchi hadasho Soomaali, waaxda luqadaha, qaybta kaalmada adeegyada, migdalia louis raleighligia portillo natalieradha gaffney . So [facility] [phone].    ATENCIÓN: Si jeremie mcclelland, tiene a zaidi disposición servicios gratuitos de asistencia lingüística. Llame al [phone].    We comply with applicable federal civil rights laws and [place] laws. We do not discriminate on the basis of race, color, national origin, age, disability sex, sexual orientation or gender identity.               Review of your medicines      CONTINUE these medicines which may have CHANGED, or have new prescriptions. If we are uncertain of the size of tablets/capsules you have at home, strength may be listed as something that might have changed.        Dose / Directions    lisinopril 20 MG tablet   Commonly known as:  PRINIVIL/ZESTRIL   This may have changed:  how much to take   Used for:  Benign essential hypertension        Dose:  10 mg   Take 0.5 tablets (10 mg) by mouth daily   Quantity:  90 tablet   Refills:  3         CONTINUE these medicines which have NOT CHANGED        Dose / Directions    atorvastatin 20 MG tablet   Commonly known as:  LIPITOR   Used for:  Hyperlipidemia LDL goal <100        Dose:  20 mg   Take 1 tablet (20 mg) by mouth daily   Quantity:  90 tablet   Refills:  3       escitalopram 20 MG tablet   Commonly known as:  LEXAPRO   Used for:  Mild recurrent major depression (H)        Dose:  20 mg   Take 1 tablet (20 mg) by mouth daily   Quantity:  90 tablet   Refills:  3       METFORMIN HCL PO        Dose:  1000 mg   Take  1,000 mg by mouth 2 times daily (with meals)   Refills:  0            Where to get your medicines      Some of these will need a paper prescription and others can be bought over the counter. Ask your nurse if you have questions.     You don't need a prescription for these medications     lisinopril 20 MG tablet                Protect others around you: Learn how to safely use, store and throw away your medicines at www.disposemymeds.org.             Medication List: This is a list of all your medications and when to take them. Check marks below indicate your daily home schedule. Keep this list as a reference.      Medications           Morning Afternoon Evening Bedtime As Needed    atorvastatin 20 MG tablet   Commonly known as:  LIPITOR   Take 1 tablet (20 mg) by mouth daily   Last time this was given:  20 mg on 7/26/2017  9:58 PM   Next Dose Due:  Tonight 7/27 at bedtime                                   escitalopram 20 MG tablet   Commonly known as:  LEXAPRO   Take 1 tablet (20 mg) by mouth daily   Last time this was given:  20 mg on 7/26/2017  9:58 PM   Next Dose Due:  Tonight 7/27 at bedtime                                   lisinopril 20 MG tablet   Commonly known as:  PRINIVIL/ZESTRIL   Take 0.5 tablets (10 mg) by mouth daily   Last time this was given:  10 mg on 7/27/2017 10:32 AM   Next Dose Due:  Tomorrow 7/28 in the morning                                   METFORMIN HCL PO   Take 1,000 mg by mouth 2 times daily (with meals)   Next Dose Due:  Not taken during hospital stay. May take as directed                                          More Information        Neutropenia  White blood cells (WBCs) help protect the body from infection. Neutrophils are a type of white blood cell. Their main job is to help the body fight bacterial and fungal infections. Neutropenia occurs when there are fewer neutrophils in the blood than normal. It can range from mild to severe. This depends on the number of neutrophils in the  blood. Severe neutropenia puts a person at higher risk for having more infections. Bacterial and fungal infections are most common. Your doctor can tell you more about your condition and whether it needs to be treated.    What causes neutropenia?  There are 2 main types of neutropenia: congenital and acquired. Each type has many causes:    Congenital neutropenia. These are the types that are present at birth. They are caused by certain rare genetic conditions, such as Kostmann s syndrome. Most often the neurtropenia is mild and normal for certain ethnic groups.    Acquired neutropenia. This type is not present at birth. Causes include:    Certain medicines, such as antibiotics and chemotherapy drugs    Certain autoimmune conditions    Certain viral, bacterial, or parasitic infections    Too little folate or vitamin B12 in the diet    Underlying bone marrow problem, such as leukemia or myelodysplastic syndrome (MDS)    Other causes  How is neutropenia diagnosed?  Your healthcare provider may check for neutropenia if you have frequent infections. Your provider may also check for neutropenia if you re having certain treatments, such as chemotherapy, which is known to cause a lower neutrophil count. Tests will be done to confirm the problem. These may include:    A complete blood cell count (CBC). This test measures the amounts of the different types of cells in your blood. This includes the WBCs. The WBC count can be broken down further to find the number of neutrophils and immature neutrophils (bands) in your blood. This is called an absolute neutrophil count (ANC).    A blood smear. This test checks for the different types of blood cells in your blood and how they appear. A sample of your blood is spread on a glass slide and viewed under a microscope. A stain is used so the blood cells can be seen.    A bone marrow aspiration and biopsy. This test checks for problems with how your bone marrow makes blood cells. A  needle is used to remove a sample of the bone marrow in your hipbone. The sample is then sent to a lab to be tested for problems.  How is neutropenia treated?    If there is a clear cause of neutropenia, it is addressed. For instance, if a medicine is the cause, it may be stopped or changed.    For mild cases, often no treatment is needed.    For moderate to severe cases, treatment is likely needed. This may include:    G-CSF (granulocyte-colony stimulating factor). This is a special type of protein. It helps promote the growth and activity of neutrophils. G-CSF is given by injection.    Bone marrow transplant. This treatment replaces diseased bone marrow cells with healthy cells from a matched donor. This treatment is only done in specific severe cases.  What is the long-term outcome of neutropenia?  The outcome of neutropenia varies for each person. For some people, neutropenia may resolve after a few weeks or months. For other people, it may be long-lasting. In these cases, ongoing care and treatment is needed. Your healthcare provider will talk to you more about what to expect from your condition.  When to call your healthcare provider  Call your healthcare provider right away if you have any of the following:    Fever of 100.4 F (38 C) or higher (call 911 or 24-hour urgent care -- this is especially important if you have severe neutropenia, which puts you at higher risk for life-threatening infection)    Cold sweat or chills    Chest pain or trouble breathing    Sore throat    Extreme tiredness or fatigue    Nausea and vomiting    Redness, warmth, or drainage from any open cuts or wounds    Pain or burning with urination; frequent urination    Pain, burning, or bleeding in the rectum    Severe constipation or diarrhea    Bloody stool or urine    How can I prevent infections?  With neutropenia, you need to take extra care to protect yourself from infection. Be sure to:    Wash your hands often, especially before  eating and after using the bathroom. Use warm water and soap. Or use a hand gel that contains at least 60% alcohol.    Avoid close contact with others who may be ill.    Clean items you use often with disinfectant wipes. This includes phones and computer keyboards.    Avoid touching your eyes, nose, and mouth, especially if your hands are not clean.    Practice good oral hygiene. Use a soft toothbrush. Also, brush and floss your teeth gently.    Always wipe from front to back after a bowel movement.    Keep cuts and scrapes clean and covered until they heal.    Avoid sharing items such as towels, toothbrushes, razors, clothing, and sports equipment.    Store and handle foods safely to prevent food-borne illness.    Ask your healthcare provider if you need to take antibiotics before and after having any dental or medical procedures.    Ask your healthcare provider if you need to wear a special mask near construction sites or farm areas.  Date Last Reviewed: 12/1/2016 2000-2017 [employer]. [address] [place] [address], [place] [address]. All rights reserved. This information is not intended as a substitute for professional medical care. Always follow your healthcare professional's instructions.

## 2017-07-25 NOTE — ED PROVIDER NOTES
History     Chief Complaint:  Fever     HPI   Cheryl Patel is a 56 year old female with a history of breast cancer diagnosed in April, s/p mastectomy with positive lymph nodes in June, 7 days status post C1D1, and diabetes, who presents with a fever and fatigue. The patient on Adriamycin for four cycles every other week before going to Taxol therapy for the 12 weeks following that. Her first chemo dose was 7 days ago. She had a port placed 2 weeks ago and had that accessed today in clinic. The patient reports she has been feeling very fatigued for the past week and has been sleeping 5+ hours during the day. She does not have much of an appetite but has been trying to eat and stay hydrated. The patient reports she woke up today feeling more fatigued and generally unwell. She had her routine clinic appointment with Dr. Ina Pinzon of [facility] today and had blood work drawn that showed absolute neutrophils of 0.1 along with WBC, HGB and PLT as shown below. The patient went home after her appointment, took a nap, and woke up feeling chilled with a fever up to 100.1. The patient called her clinic this afternoon and told them about the fever and they told her to come to the ED due to her abnormal lab values. On arrival to the ED, the patient reports she is feeling fatigued, achy, and tired. She also notes she has pain across her lower back and across her chest that have both been present for the past week. She describes it as an aching pain that is aggravated when she moves or presses on her low back. The patient states her port is still fairly tender. The patient also reports that the chemo triggered her period and she has been bleeding for the past 7 days, though she denies any heavy bleeding or changing her pad/tampon more than once an hour. She reports she has mild pain in the right side of her throat. The patient denies any cough, cold, runny nose, diarrhea, dysuria, hematuria, urinary frequency, or new  rash. She denies any shortness of breath. She denies any sick contacts though notes she [place] concerned [place]     Laboratory values from clinic today:  Absolute neutrophil 0.1  WBC 1.1  HGB 10.9      Allergies:  No Known Allergies     Medications:    atorvastatin (LIPITOR) 20 MG tablet  escitalopram (LEXAPRO) 20 MG tablet  lisinopril (PRINIVIL/ZESTRIL) 20 MG tablet  METFORMIN HCL PO    Past Medical History:    Breast cancer  Diabetes    Past Surgical History:    Breast surgery  Mastectomy     Family History:    History reviewed. No pertinent family history.     Social History:  Smoking status: Former smoker  Alcohol use: Yes, socially  Presents to the ED with her   Marital Status:   [2]     Review of Systems   Constitutional: Positive for appetite change, chills, fatigue and fever.   HENT: Positive for sore throat. Negative for congestion, ear pain and rhinorrhea.    Respiratory: Negative for cough and shortness of breath.    Cardiovascular: Positive for chest pain.   Gastrointestinal: Negative for abdominal pain, diarrhea and vomiting.   Genitourinary: Negative for difficulty urinating, dysuria, flank pain, frequency and hematuria.   Musculoskeletal: Positive for back pain and myalgias.   Skin: Negative for rash.   Neurological: Negative for headaches.   All other systems reviewed and are negative.      Physical Exam   Patient Vitals for the past 24 hrs:   BP Temp Temp src Pulse Heart Rate Resp SpO2 Weight   07/25/17 2030 117/73 - - - - - 92 % -   07/25/17 2015 113/66 - - - - - 93 % -   07/25/17 2000 126/70 - - - - - 94 % -   07/25/17 1945 117/72 - - - - - 99 % -   07/25/17 1930 106/69 - - - - - - -   07/25/17 1900 127/59 98.6  F (37  C) - - 96 16 98 % -   07/25/17 1845 112/66 - - - - - 97 % -   07/25/17 1830 120/73 - - - - - 97 % -   07/25/17 1815 123/65 - - - - - 96 % -   07/25/17 1800 120/68 - - - - - 94 % -   07/25/17 1745 121/74 98  F (36.7  C) Oral 103 -  18 96 % 73.5 kg (162 lb)       Physical Exam   HENT:   Right Ear: External ear normal.   Left Ear: External ear normal.   Nose: Nose normal.   Mouth/Throat: No oropharyngeal exudate.   Eyes: Conjunctivae and lids are normal.   Neck: Neck supple. No tracheal deviation present.   Cardiovascular: Regular rhythm and intact distal pulses.    Pulmonary/Chest: Breath sounds normal. No respiratory distress. She has no wheezes. She has no rales.   Port site clean dry and intact no surrounding erythema   Abdominal: Soft. There is no tenderness. There is no rebound and no guarding.   Musculoskeletal: She exhibits no edema.   No peripheral edema   Neurological: She is alert.   MAEE, no gross focal motor or sensory deficit   Skin: Skin is warm and dry. No rash noted. She is not diaphoretic.   Psychiatric: She has a normal mood and affect.   Nursing note and vitals reviewed.        Emergency Department Course   Imaging:  Radiographic findings were communicated with the patient who voiced understanding of the findings.    X-ray Chest, 2 views:  Clear lungs  Result per radiology.     Laboratory:  CBC: Absolute neutrophil 0.2(LL), WBC 1.4(L), HGB 10.7(L), o/w WNL ()   CMP: Glucose 140(H), Albumin 3.2(L), o/w WNL (Creatinine 0.56)  Lactic acid: 2.0  Rapid strep: Negative  UA: Squamous epithelial 3(H), Mucous present, o/w negative    Beta strep culture: in process  Blood cultures: in process    Interventions:  1852: Ibuprofen 400 mg oral   1854: Lactated ringers bolus 1L IV  1936: Dilaudid 0.5 mg IV  2001: Zofran 4 mg IV  2004: Cefepime 2 g IV  2043: Zofran 4 mg IV  2046: Vancomycin 1,500 mg IV  2116: Ativan 0.5 mg IV  2118: Reglan 5 mg IV    Emergency Department Course:  Past medical records, nursing notes, and vitals reviewed.  1807: I performed an exam of the patient and obtained history, as documented above.  IV inserted and blood drawn.    1934: I rechecked the patient. Explained findings to the patient.    2007: I spoke  to Dr. Stacy on call for [facility]  : I rechecked the patient. Explained findings to the patient.    : I spoke to Dr. Rojas of the hospitalist service who accepts the patient for admission.     Findings and plan explained to the Patient who consents to admission.   Discussed the patient with Dr. Rojas, who will admit the patient to a med bed for further monitoring, evaluation, and treatment.     Impression & Plan      Medical Decision Makin-year-old female on chemotherapy for breast cancer with neutropenic fever. Workup for bacterial source unremarkable at this time given the high risk for infection was admitted here and given empiric antibiotics.  Discussed with [facility] Dr. Camilo.  Likely and Biaxin until cultures are negative has an indwelling line O to be another source of infection.    Diagnosis:    ICD-10-CM   1. Neutropenic fever (H) D70.9    R50.81     Disposition: Admitted under the care of Dr. Crystal Caceres  2017   [facility] EMERGENCY DEPARTMENT    I, Rachel Caceres, am serving as a scribe at 6:07 PM on 2017 to document services personally performed by Rad Blanchard MD based on my observations and the provider's statements to me.        Rad Blanchard MD  17 0022

## 2017-07-25 NOTE — IP AVS SNAPSHOT
[place] [address] [facility]    [address]    [facility] [address]    Phone:  [phone]    Fax:  [phone]                                       After Visit Summary   7/25/2017    Cheryl Patel    MRN: [id number]           After Visit Summary Signature Page     I have received my discharge instructions, and my questions have been answered. I have discussed any challenges I see with this plan with the nurse or doctor.    ..........................................................................................................................................  Patient/Patient Representative Signature      ..........................................................................................................................................  Patient Representative Print Name and Relationship to Patient    ..................................................               ................................................  Date                                            Time    ..........................................................................................................................................  Reviewed by Signature/Title    ...................................................              ..............................................  Date                                                            Time

## 2017-07-26 LAB
ANION GAP SERPL CALCULATED.3IONS-SCNC: 6 MMOL/L (ref 3–14)
BACTERIA SPEC CULT: NORMAL
BASOPHILS # BLD AUTO: 0.1 10E9/L (ref 0–0.2)
BASOPHILS NFR BLD AUTO: 3 %
BUN SERPL-MCNC: 6 MG/DL (ref 7–30)
CALCIUM SERPL-MCNC: 8.3 MG/DL (ref 8.5–10.1)
CHLORIDE SERPL-SCNC: 108 MMOL/L (ref 94–109)
CO2 SERPL-SCNC: 28 MMOL/L (ref 20–32)
CREAT SERPL-MCNC: 0.52 MG/DL (ref 0.52–1.04)
DIFFERENTIAL METHOD BLD: ABNORMAL
EOSINOPHIL # BLD AUTO: 0.2 10E9/L (ref 0–0.7)
EOSINOPHIL NFR BLD AUTO: 6 %
ERYTHROCYTE [DISTWIDTH] IN BLOOD BY AUTOMATED COUNT: 13.5 % (ref 10–15)
GFR SERPL CREATININE-BSD FRML MDRD: ABNORMAL ML/MIN/1.7M2
GLUCOSE BLDC GLUCOMTR-MCNC: 125 MG/DL (ref 70–99)
GLUCOSE BLDC GLUCOMTR-MCNC: 138 MG/DL (ref 70–99)
GLUCOSE BLDC GLUCOMTR-MCNC: 153 MG/DL (ref 70–99)
GLUCOSE BLDC GLUCOMTR-MCNC: 165 MG/DL (ref 70–99)
GLUCOSE SERPL-MCNC: 107 MG/DL (ref 70–99)
HBA1C MFR BLD: 6.4 % (ref 4.3–6)
HCT VFR BLD AUTO: 29.6 % (ref 35–47)
HGB BLD-MCNC: 9.5 G/DL (ref 11.7–15.7)
LYMPHOCYTES # BLD AUTO: 1.1 10E9/L (ref 0.8–5.3)
LYMPHOCYTES NFR BLD AUTO: 43 %
Lab: NORMAL
MAGNESIUM SERPL-MCNC: 1.6 MG/DL (ref 1.6–2.3)
MCH RBC QN AUTO: 27.2 PG (ref 26.5–33)
MCHC RBC AUTO-ENTMCNC: 32.1 G/DL (ref 31.5–36.5)
MCV RBC AUTO: 85 FL (ref 78–100)
METAMYELOCYTES # BLD: 0.1 10E9/L
METAMYELOCYTES NFR BLD MANUAL: 3 %
MICRO REPORT STATUS: NORMAL
MONOCYTES # BLD AUTO: 0.2 10E9/L (ref 0–1.3)
MONOCYTES NFR BLD AUTO: 7 %
MYELOCYTES # BLD: 0 10E9/L
MYELOCYTES NFR BLD MANUAL: 1 %
NEUTROPHILS # BLD AUTO: 1 10E9/L (ref 1.6–8.3)
NEUTROPHILS NFR BLD AUTO: 37 %
PLATELET # BLD AUTO: 132 10E9/L (ref 150–450)
PLATELET # BLD EST: NORMAL 10*3/UL
POTASSIUM SERPL-SCNC: 3.8 MMOL/L (ref 3.4–5.3)
RBC # BLD AUTO: 3.49 10E12/L (ref 3.8–5.2)
RBC MORPH BLD: ABNORMAL
SODIUM SERPL-SCNC: 142 MMOL/L (ref 133–144)
SPECIMEN SOURCE: NORMAL
VARIANT LYMPHS BLD QL SMEAR: PRESENT
WBC # BLD AUTO: 2.6 10E9/L (ref 4–11)

## 2017-07-26 PROCEDURE — 80048 BASIC METABOLIC PNL TOTAL CA: CPT | Performed by: INTERNAL MEDICINE

## 2017-07-26 PROCEDURE — 83036 HEMOGLOBIN GLYCOSYLATED A1C: CPT | Performed by: INTERNAL MEDICINE

## 2017-07-26 PROCEDURE — 12000000 ZZH R&B MED SURG/OB

## 2017-07-26 PROCEDURE — 83735 ASSAY OF MAGNESIUM: CPT | Performed by: INTERNAL MEDICINE

## 2017-07-26 PROCEDURE — 25000128 H RX IP 250 OP 636: Performed by: INTERNAL MEDICINE

## 2017-07-26 PROCEDURE — 00000146 ZZHCL STATISTIC GLUCOSE BY METER IP

## 2017-07-26 PROCEDURE — 25000132 ZZH RX MED GY IP 250 OP 250 PS 637: Performed by: INTERNAL MEDICINE

## 2017-07-26 PROCEDURE — 25000131 ZZH RX MED GY IP 250 OP 636 PS 637: Performed by: INTERNAL MEDICINE

## 2017-07-26 PROCEDURE — 99232 SBSQ HOSP IP/OBS MODERATE 35: CPT | Performed by: INTERNAL MEDICINE

## 2017-07-26 PROCEDURE — 85025 COMPLETE CBC W/AUTO DIFF WBC: CPT | Performed by: INTERNAL MEDICINE

## 2017-07-26 RX ORDER — DIPHENHYDRAMINE HCL 25 MG
25-50 CAPSULE ORAL EVERY 6 HOURS PRN
Status: DISCONTINUED | OUTPATIENT
Start: 2017-07-26 | End: 2017-07-27 | Stop reason: HOSPADM

## 2017-07-26 RX ORDER — HEPARIN SODIUM,PORCINE 10 UNIT/ML
5-10 VIAL (ML) INTRAVENOUS
Status: DISCONTINUED | OUTPATIENT
Start: 2017-07-26 | End: 2017-07-27 | Stop reason: HOSPADM

## 2017-07-26 RX ORDER — NAPROXEN 250 MG/1
250 TABLET ORAL 2 TIMES DAILY PRN
Status: DISCONTINUED | OUTPATIENT
Start: 2017-07-26 | End: 2017-07-27 | Stop reason: HOSPADM

## 2017-07-26 RX ORDER — HEPARIN SODIUM,PORCINE 10 UNIT/ML
5-10 VIAL (ML) INTRAVENOUS EVERY 24 HOURS
Status: DISCONTINUED | OUTPATIENT
Start: 2017-07-26 | End: 2017-07-27 | Stop reason: HOSPADM

## 2017-07-26 RX ORDER — HEPARIN SODIUM (PORCINE) LOCK FLUSH IV SOLN 100 UNIT/ML 100 UNIT/ML
5 SOLUTION INTRAVENOUS
Status: DISCONTINUED | OUTPATIENT
Start: 2017-07-26 | End: 2017-07-27 | Stop reason: HOSPADM

## 2017-07-26 RX ADMIN — NAPROXEN 250 MG: 250 TABLET ORAL at 15:56

## 2017-07-26 RX ADMIN — INSULIN ASPART 1 UNITS: 100 INJECTION, SOLUTION INTRAVENOUS; SUBCUTANEOUS at 17:32

## 2017-07-26 RX ADMIN — ACETAMINOPHEN 650 MG: 325 TABLET, FILM COATED ORAL at 13:04

## 2017-07-26 RX ADMIN — OXYCODONE HYDROCHLORIDE 5 MG: 5 TABLET ORAL at 11:33

## 2017-07-26 RX ADMIN — SODIUM CHLORIDE: 9 INJECTION, SOLUTION INTRAVENOUS at 19:57

## 2017-07-26 RX ADMIN — OXYCODONE HYDROCHLORIDE 5 MG: 5 TABLET ORAL at 12:58

## 2017-07-26 RX ADMIN — INSULIN ASPART 1 UNITS: 100 INJECTION, SOLUTION INTRAVENOUS; SUBCUTANEOUS at 12:35

## 2017-07-26 RX ADMIN — ESCITALOPRAM OXALATE 20 MG: 20 TABLET ORAL at 21:58

## 2017-07-26 RX ADMIN — SODIUM CHLORIDE: 9 INJECTION, SOLUTION INTRAVENOUS at 08:32

## 2017-07-26 RX ADMIN — CEFEPIME 2 G: 2 INJECTION, POWDER, FOR SOLUTION INTRAMUSCULAR; INTRAVENOUS at 08:33

## 2017-07-26 RX ADMIN — CEFEPIME 2 G: 2 INJECTION, POWDER, FOR SOLUTION INTRAMUSCULAR; INTRAVENOUS at 19:57

## 2017-07-26 RX ADMIN — DIPHENHYDRAMINE HYDROCHLORIDE 25 MG: 25 CAPSULE ORAL at 15:56

## 2017-07-26 RX ADMIN — OXYCODONE HYDROCHLORIDE 5 MG: 5 TABLET ORAL at 06:29

## 2017-07-26 RX ADMIN — ATORVASTATIN CALCIUM 20 MG: 20 TABLET, FILM COATED ORAL at 21:58

## 2017-07-26 ASSESSMENT — PAIN DESCRIPTION - DESCRIPTORS: DESCRIPTORS: ACHING

## 2017-07-26 NOTE — H&P
[facility]    History and Physical  Hospitalist    Name: Cheryl Patel    MRN: [id number]  YOB: 1960    Age: 56 year old  Primary care provider: Ina Pinzon       Date of Admission:  7/25/2017  Date of Service (when I saw the patient): 07/25/17    Assessment & Plan   Cheryl Patel is a 56 year old female with recent diagnosis of breast cancer s/p mastectomy who received her first cycle of chemotherapy, and presents to hospital with nausea, vomiting, body aches, fever and neutropenia     1. Febrile neutropenia - no obvious focal site of infection noted, she received neulasta with her chemo, so hopefully her counts will start to recover soon. Follow blood cultures, some soreness around port but does not appear infected, continue iv cefepime until counts recover and fever resolve. Oncology consult.     2. Back pain and myalgias - could be related to the neulasta, if becomes more worse then consider imaging of back     3. Nausea, vomiting - supportive cares, hydration, prn antiemetics, related to chemo    4. Breast cacner    5. HTN - resume lisinopril     6. DM - hold metformin with her Gi issues, SSI       DVT Prophylaxis: PCD, consider adding lovenox if not able to ambulate much and PLATELETS  ok  Code Status: Full Code    Disposition: Expected discharge in 2-3 days once fevers resolve and ANC recovers     Plan of care was discussed with the patient in great detail. All of the questions were answered extensively. Patient is comfortable with the plan and agrees with it.     Braydon Rojas    Chief Complaint   weakness    History is obtained from the patient    History of Present Illness   Cheryl Patel is a 56 year old female who presents with weakness. She received her first cycle of chemo about a week ago. She has been feeling very weak since then, sleeping a lot, very fatigued, today she went to clinic and was found to have neutropenia, after coming back home  she had a fever in 100 range and was asked to come to ER.she denies any diarrhea but has been having a lot of nausea and vomiting, no abdominal pain, no chest pain, no there complaints.     Past Medical History    I have reviewed this patient's medical history and updated it with pertinent information if needed.   Past Medical History:   Diagnosis Date     Cancer (H)      Diabetes (H)        Past Surgical History   I have reviewed this patient's surgical history and updated it with pertinent information if needed.  Past Surgical History:   Procedure Laterality Date     BIOPSY       BREAST SURGERY       MASTECTOMY         Prior to Admission Medications   Prior to Admission Medications   Prescriptions Last Dose Informant Patient Reported? Taking?   METFORMIN HCL PO 7/25/2017 at x1  Yes Yes   Sig: Take 1,000 mg by mouth 2 times daily (with meals)   atorvastatin (LIPITOR) 20 MG tablet 7/24/2017 at pm  No Yes   Sig: Take 1 tablet (20 mg) by mouth daily   escitalopram (LEXAPRO) 20 MG tablet 7/24/2017 at pm  No Yes   Sig: Take 1 tablet (20 mg) by mouth daily   lisinopril (PRINIVIL/ZESTRIL) 20 MG tablet 7/24/2017 at pm  No Yes   Sig: Take 1 tablet (20 mg) by mouth daily      Facility-Administered Medications: None     Allergies   No Known Allergies    Social History   I have reviewed this patient's social history and updated it with pertinent information if needed. Cheryl Patel  reports that she has quit smoking. She does not have any smokeless tobacco history on file. She reports that she drinks alcohol.    Family History   I have reviewed this patient's family history and updated it with pertinent information if needed.   Family History   Problem Relation Age of Onset     Breast Cancer No family hx of        Review of Systems   The 10 point Review of Systems is negative other than noted in the HPI or here.     Physical Exam   Temp: 98.8  F (37.1  C) Temp src: Oral BP: 114/47 Pulse: 103 Heart Rate: 91 Resp: 16 SpO2:  93 % O2 Device: None (Room air)    Vital Signs with Ranges  Temp:  [98  F (36.7  C)-98.8  F (37.1  C)] 98.8  F (37.1  C)  Pulse:  [103] 103  Heart Rate:  [68-96] 91  Resp:  [16-18] 16  BP: (106-127)/(47-74) 114/47  SpO2:  [92 %-99 %] 93 %  165 lbs 12.57 oz    GENERAL:  Awake and alert, vomiting  PSYCH: Pleasant, Cooperative, normal affect, no hallucinations   EYES: EOMI, conjunctiva clear  HEENT:  Head is normocephalic, atraumatic, Neck is Supple, trachea is midline   CARDIOVASCULAR: Regular rate and rhythm, Normal S1, S2, no loud murmurs, no rubs or gallops.   PULMONARY:  Clear to auscultation bilaterally with good entry on both sides  CHEST: Good inspiratory effort bilaterally   GI: Abdomen is soft, non tender, non-distended, no masses palpated, normal bowel sounds. No rebound or guarding   SKIN:  Dry, warm to touch. No obvious exanthems on exposed areas  EXTREMITIES:  Good capillary refill with signs of adequate peripheral perfusion. No peripheral edema   Neuro: Awake and oriented x 3. No focal weakness or numbness is noted. Moving all extremities with good strength, Grossly non-focal.   MSK: Good range of motion in all major joints of upper and lower extremity, No acute joint synovitis of the major joints is noted.     Data   Data reviewed today:  I personally reviewed .    All lab data and imaging results from today have been reviewed.       Recent Labs  Lab 07/25/17  1815   WBC 1.4*   HGB 10.7*   MCV 85         POTASSIUM 3.9   CHLORIDE 104   CO2 27   BUN 8   CR 0.56   ANIONGAP 7   PRO 9.2   *   ALBUMIN 3.2*   PROTTOTAL 6.8   BILITOTAL 0.4   ALKPHOS 113   ALT 21   AST 9       Recent Results (from the past 24 hour(s))   XR Chest 2 Views    Narrative    CHEST TWO VIEWS 7/25/2017 7:27 PM     COMPARISON: None.    HISTORY: Chest pain, shortness of breath.    FINDINGS: There is a left internal jugular central venous Port-A-Cath.  The cardiac silhouette, pulmonary vasculature, lungs and  pleural  spaces are within normal limits.      Impression    IMPRESSION: Clear lungs.    PATTI KESSLER MD

## 2017-07-26 NOTE — CONSULTS
Hematology / Oncology Consultation     Cheryl Patel MRN# [id number]   YOB: 1960 Age: 56 year old   Date of Admission: 7/25/2017     Reason for consult: I was asked by Dr. Rojas to evaluate this patient for Neutropenic fever           Assessment and Plan:   # Neutropenic fever, chemotherapy-related    Agree with completed workup.  Agree with Cefepime while in the hospital until afebrile and ANC>500.  Today is day 8 after cycle #1 of chemotherapy, so ANC should be on the way up soon.    # Low back and hip pain    Related to chemotherapy and Neulasta injection.  In addition to existing pain medications, could use Benadryl which works well for Neulasta-induced bone pain.    # Nausea, chemotherapy-induced    Minimal nausea currently.  Appetite is starting to come back.  Agree with PRN nausea medications    # Follow-up    Will follow up with Dr. Pinzon in clinic next Tuesday prior to cycle #2 of chemotherapy.  Will discuss additional supportive medications and/or dose reduction with Dr. Pinzon at that time.    Thank you very much for the consult.  We will continue to follow along.    Talib Salamanca M.D.  [facility]               Chief Complaint:   Neutropenic fever    History is obtained from the patient         History of Present Illness:   This is a patient of Dr. Pinzon's, who was admitted to the hospital yesterday for generalized weakness, nausea, and neutropenic fever.  The patient has a history of stage II ER CT positive and HER-2 negative breast cancer, status post surgery.  She received her first cycle of adjuvant AC last Tuesday.  Neulasta was given last Wednesday.  Over the last week, she has developed progressive weakness, fatigue, decreased appetite, nausea, epigastric discomfort, and bone pain likely related to Neulasta.    She was seen in clinic yesterday for the symptoms.  On her way home she developed a fever of 100.5, so she was recommended to be admitted to the hospital for  observation.  Initial CBC showed a hemoglobin of 10.7, white blood cell count of 1.4, with an absolute neutrophil count of 0.2, and a platelet count of 160.  Workup for neutropenic fever has included a chest x-ray and a urinalysis which were unremarkable.  The patient was appropriately started on cefepime.    She was given some pain medications, nausea medications, and IV fluids overnight.  She is afebrile this morning.  Her appetite is starting to come back, and she was eating breakfast when I visited with her.  She reports persistent pain in the low back and hips, which started after her Neulasta injection.  She rates her pain at 2 out of 10.  She denies any mucositis abdominal pain or diarrhea.            Past Medical History:     Past Medical History:   Diagnosis Date     Cancer (H)      Diabetes (H)              Past Surgical History:     Past Surgical History:   Procedure Laterality Date     BIOPSY       BREAST SURGERY       MASTECTOMY                Social History:     Social History   Substance Use Topics     Smoking status: Former Smoker     Smokeless tobacco: Not on file     Alcohol use Yes      Comment: socially             Family History:     Family History   Problem Relation Age of Onset     Breast Cancer No family hx of                Allergies:   No Known Allergies          Medications:     Prescriptions Prior to Admission   Medication Sig Dispense Refill Last Dose     atorvastatin (LIPITOR) 20 MG tablet Take 1 tablet (20 mg) by mouth daily 90 tablet 3 7/24/2017 at pm     escitalopram (LEXAPRO) 20 MG tablet Take 1 tablet (20 mg) by mouth daily 90 tablet 3 7/24/2017 at pm     lisinopril (PRINIVIL/ZESTRIL) 20 MG tablet Take 1 tablet (20 mg) by mouth daily 90 tablet 3 7/24/2017 at pm     METFORMIN HCL PO Take 1,000 mg by mouth 2 times daily (with meals)   7/25/2017 at x1             Review of Systems:   The 10 point Review of Systems is negative other than noted in the HPI           Physical Exam:    Vitals were reviewed  Initial vitals were reviewed  Constitutional:   Awake, alert, and oriented.  Appears fatigued     Eyes:   Sclerae anicteric     Mouth:   Moist mucous membranes without ulceration or thrush     Lungs:   Clear to auscultation     Abdomen:   Soft, nontender, nondistended     Skin:   PORT site does not appear infected.                         Data:   All laboratory and imaging data in the past 24 hours reviewed

## 2017-07-26 NOTE — ED NOTES
".  [facility]  ED Nurse Handoff Report    Cheryl Patel is a 56 year old female   ED Chief complaint: Fever and On Chemo  . ED Diagnosis:   Final diagnoses:   Neutropenic fever (H)     Allergies: No Known Allergies    Code Status: Full Code  Activity level - Baseline/Home:  Independent. Activity Level - Current:   Stand with Assist. Lift room needed: No. Bariatric: No   Needed: No   Isolation: Patient is neutropenic --protective iso . Infection: Not Applicable.     Vital Signs:   Vitals:    07/25/17 1845 07/25/17 1900 07/25/17 1930 07/25/17 1945   BP: 112/66 127/59 106/69 117/72   Pulse:       Resp:       Temp:       TempSrc:       SpO2: 97% 98%  99%   Weight:           Cardiac Rhythm:  ,      Pain level: 0-10 Pain Scale: 5  Patient confused: No. Patient Falls Risk: Yes.   Elimination Status: Has voided   Patient Report - Initial Complaint: Temp 100.1, her \"counts are low\" after chemo last tuesday. Focused Assessment: Generalized body aches.  Chest and back pain ever since surgery.  She is fatigued, sleeping more than usual at home since Chemo last Tuesday (her first chemo).    Tests Performed: Chest xray, labs, blood culture. Abnormal Results:WBC: 1.4 10e9/L [Ref Range: 4.0 - 11.0]  RBC Count: 3.96 10e12/L [Ref Range: 3.8 - 5.2]  Hemoglobin: 10.7 g/dL [Ref Range: 11.7 - 15.7]  Hematocrit: 33.6 % [Ref Range: 35.0 - 47.0]  MCV: 85 fl [Ref Range: 78 - 100]  MCH: 27.0 pg [Ref Range: 26.5 - 33.0]  MCHC: 31.8 g/dL [Ref Range: 31.5 - 36.5]  RDW: 13.3 % [Ref Range: 10.0 - 15.0]  Platelet Count: 160 10e9/L [Ref Range: 150 - 450]  Diff Method: Manual Differential  % Neutrophils: 15.0 %  % Lymphocytes: 52.0 %  % Monocytes: 18.0 %  % Eosinophils: 8.0 %  % Basophils: 6.0 %  % Myelocytes: 1.0 %  Absolute Neutrophil: 0.2 10e9/L [Ref Range: 1.6 - 8.3] (This result has been called to YADIEL ADAMS(MIAN) by Michael Diaz on 07 25 2017   at 1927, and has been read back.   )  Absolute Lymphocytes: 0.7 " 10e9/    Treatments provided: Fluid bolus, antibiotics, pain meds  Family Comments:  Rk is here with her.  OBS brochure/video discussed/provided to patient:  N/A  ED Medications:   Medications   lidocaine (LMX4) 4 % kit (not administered)   lactated ringers BOLUS 1,000 mL (1,000 mLs Intravenous New Bag 7/25/17 1854)   ceFEPIme (MAXIPIME) 2 g vial to attach to  ml bag for ADULTS or 50 ml bag for PEDS (not administered)   HYDROmorphone (PF) (DILAUDID) 0.5 MG/0.5 ML injection (not administered)   HYDROmorphone (PF) (DILAUDID) injection 0.5 mg (0.5 mg Intravenous Given 7/25/17 1936)   vancomycin (VANCOCIN) 1500 mg in 0.9% NaCl 250 mL PREMIX (not administered)   HYDROmorphone (PF) (DILAUDID) injection 0.5 mg (0.5 mg Intravenous Given 7/25/17 1853)   ibuprofen (ADVIL/MOTRIN) tablet 400 mg (400 mg Oral Given 7/25/17 1852)     Drips infusing:  Yes  For the majority of the shift this patient was Yellow. Interventions performed were Labs, meds, fluids.     Severe Sepsis OR Septic Shock Diagnosis Present: No      ED Nurse Name/Phone Number: Susan Lockhart,   7:49 PM    RECEIVING UNIT ED HANDOFF REVIEW    Above ED Nurse Handoff Report was reviewed: Yes  Reviewed by: Monika Hadley on July 25, 2017 at 9:06 PM

## 2017-07-26 NOTE — PROGRESS NOTES
[facility]  Hospitalist Progress Note  Iain Ward MD 07/26/2017    Reason for Stay (Diagnosis): neutropenic fevers, malaise.         Assessment and Plan:      Summary of Stay: Cheryl Patel is a 56 year old female with recent diagnosis of stage IIB (right-sided lesion 2.5 cm diameter and fully resected with 2/7 lymph nodes positive), ER/[place] positive, Her-2 (unclear) breast cancer who came to attention on 7/25/17 with fevers and malaise. She is being managed by Dr. Pinzon and her recent hx is significant for right mastectomy in June, 2017 and initiation of chemotherapy on 7/18/17 with Adriamycin and Cytoxan.      Upon evaluation in the ED, Ms. Patel was found to be neutropenic with ANC of 200. Today, her WBC is higher and she is feelng stronger.      Problem List:   1. Febrile neutropenia. Pt was given Neulasta following her chemo on 7/19/17.  2. Breast cancer for which pt has been initiated on Adriamycin and Cytoxan.   3. Back pain with myalgias. Recent work up has been negative for metastases. Seems likely that this is due to Neulasta side effects and Benadryl is recommended for possible management of the pain. Will offer both naproxen and Benadryl prn.  4. Nausea and vomiting.   5. DM II. Usually managed with Metfromin bid, but this is held in favor of Insulin Sliding Scale.  6. HTN.      DVT Prophylaxis: Pneumatic Compression Devices  Code Status: Full Code  Discharge Dispo: home expected  Estimated Disch Date / # of Days until Disch: Tomorrow, pending recovery of cell counts and resolution of fevers        Interval History (Subjective):      Chart reviewed, pt interviewed.      Overall feeling quite a bit better. No focal signs of infection (no dysuria, SOB, cough, etc). Pt still having discomfort in her back -- this is thought to be due to Neulasta and is thought to be manageable with Benadryl                   Physical Exam:      Last Vital Signs:  /50  Pulse 103  Temp 96.3  F  (35.7  C) (Oral)  Resp 16  Wt 75.2 kg (165 lb 12.6 oz)  LMP 07/18/2017  SpO2 94%  BMI 25.97 kg/m2    Constitutional: Awake, alert, cooperative, no apparent distress   Respiratory: Clear to auscultation bilaterally, no crackles or wheezing   Cardiovascular: Regular rate and rhythm, normal S1 and S2, and no murmur noted   Abdomen: Normal bowel sounds, soft, non-distended, non-tender   Skin: No rashes, no cyanosis, dry to touch   Neuro: Alert and oriented x3, no weakness, numbness, memory loss   Extremities: No edema, normal range of motion   Other(s):        All other systems: Negative          Medications:      All current medications were reviewed with changes reflected in problem list.         Data:      All new lab and imaging data was reviewed.   Labs/Imaging:  Results for orders placed or performed during the hospital encounter of 07/25/17 (from the past 24 hour(s))   CBC with platelets differential   Result Value Ref Range    WBC 1.4 (L) 4.0 - 11.0 10e9/L    RBC Count 3.96 3.8 - 5.2 10e12/L    Hemoglobin 10.7 (L) 11.7 - 15.7 g/dL    Hematocrit 33.6 (L) 35.0 - 47.0 %    MCV 85 78 - 100 fl    MCH 27.0 26.5 - 33.0 pg    MCHC 31.8 31.5 - 36.5 g/dL    RDW 13.3 10.0 - 15.0 %    Platelet Count 160 150 - 450 10e9/L    Diff Method Manual Differential     % Neutrophils 15.0 %    % Lymphocytes 52.0 %    % Monocytes 18.0 %    % Eosinophils 8.0 %    % Basophils 6.0 %    % Myelocytes 1.0 %    Absolute Neutrophil 0.2 (LL) 1.6 - 8.3 10e9/L    Absolute Lymphocytes 0.7 (L) 0.8 - 5.3 10e9/L    Absolute Monocytes 0.3 0.0 - 1.3 10e9/L    Absolute Eosinophils 0.1 0.0 - 0.7 10e9/L    Absolute Basophils 0.1 0.0 - 0.2 10e9/L    Absolute Myelocytes 0.0 0 10e9/L    Reactive Lymphs Present     RBC Morphology Consistent with reported results     Platelet Estimate Normal    Comprehensive metabolic panel   Result Value Ref Range    Sodium 138 133 - 144 mmol/L    Potassium 3.9 3.4 - 5.3 mmol/L    Chloride 104 94 - 109 mmol/L    Carbon  Dioxide 27 20 - 32 mmol/L    Anion Gap 7 3 - 14 mmol/L    Glucose 140 (H) 70 - 99 mg/dL    Urea Nitrogen 8 7 - 30 mg/dL    Creatinine 0.56 0.52 - 1.04 mg/dL    GFR Estimate >90  Non  GFR Calc   >60 mL/min/1.7m2    GFR Estimate If Black >90   GFR Calc   >60 mL/min/1.7m2    Calcium 9.2 8.5 - 10.1 mg/dL    Bilirubin Total 0.4 0.2 - 1.3 mg/dL    Albumin 3.2 (L) 3.4 - 5.0 g/dL    Protein Total 6.8 6.8 - 8.8 g/dL    Alkaline Phosphatase 113 40 - 150 U/L    ALT 21 0 - 50 U/L    AST 9 0 - 45 U/L   Lactic acid whole blood   Result Value Ref Range    Lactic Acid 2.0 0.7 - 2.1 mmol/L   Blood culture   Result Value Ref Range    Specimen Description Blood Left Arm     Special Requests Aerobic and anaerobic bottles received     Culture Micro No growth after 16 hours     Micro Report Status Pending    Rapid strep screen   Result Value Ref Range    Specimen Description Throat     Rapid Strep A Screen       NEGATIVE: No Group A streptococcal antigen detected by immunoassay, await   culture report.      Micro Report Status FINAL 07/25/2017    Beta strep group A culture   Result Value Ref Range    Specimen Description Throat     Culture Micro Culture negative < 24 hours, reincubate     Micro Report Status Pending    Blood culture   Result Value Ref Range    Specimen Description Blood Port     Special Requests Aerobic and anaerobic bottles received     Culture Micro No growth after 16 hours     Micro Report Status Pending    XR Chest 2 Views    Narrative    CHEST TWO VIEWS 7/25/2017 7:27 PM     COMPARISON: None.    HISTORY: Chest pain, shortness of breath.    FINDINGS: There is a left internal jugular central venous Port-A-Cath.  The cardiac silhouette, pulmonary vasculature, lungs and pleural  spaces are within normal limits.      Impression    IMPRESSION: Clear lungs.    PATTI KESSLER MD   UA with Microscopic   Result Value Ref Range    Color Urine Yellow     Appearance Urine Clear     Glucose Urine  Negative NEG mg/dL    Bilirubin Urine Negative NEG    Ketones Urine Negative NEG mg/dL    Specific Gravity Urine 1.018 1.003 - 1.035    Blood Urine Negative NEG    pH Urine 7.0 5.0 - 7.0 pH    Protein Albumin Urine Negative NEG mg/dL    Urobilinogen mg/dL 0.0 0.0 - 2.0 mg/dL    Nitrite Urine Negative NEG    Leukocyte Esterase Urine Negative NEG    Source Midstream Urine     WBC Urine 1 0 - 2 /HPF    RBC Urine 1 0 - 2 /HPF    Squamous Epithelial /HPF Urine 3 (H) 0 - 1 /HPF    Mucous Urine Present (A) NEG /LPF   Urine Culture   Result Value Ref Range    Specimen Description Midstream Urine     Special Requests Specimen received in preservative     Culture Micro Culture negative < 24 hours, reincubate     Micro Report Status Pending    Glucose by meter   Result Value Ref Range    Glucose 135 (H) 70 - 99 mg/dL   Hemoglobin A1c   Result Value Ref Range    Hemoglobin A1C 6.4 (H) 4.3 - 6.0 %   Basic metabolic panel   Result Value Ref Range    Sodium 142 133 - 144 mmol/L    Potassium 3.8 3.4 - 5.3 mmol/L    Chloride 108 94 - 109 mmol/L    Carbon Dioxide 28 20 - 32 mmol/L    Anion Gap 6 3 - 14 mmol/L    Glucose 107 (H) 70 - 99 mg/dL    Urea Nitrogen 6 (L) 7 - 30 mg/dL    Creatinine 0.52 0.52 - 1.04 mg/dL    GFR Estimate >90  Non  GFR Calc   >60 mL/min/1.7m2    GFR Estimate If Black >90   GFR Calc   >60 mL/min/1.7m2    Calcium 8.3 (L) 8.5 - 10.1 mg/dL   Magnesium   Result Value Ref Range    Magnesium 1.6 1.6 - 2.3 mg/dL   CBC with platelets differential   Result Value Ref Range    WBC 2.6 (L) 4.0 - 11.0 10e9/L    RBC Count 3.49 (L) 3.8 - 5.2 10e12/L    Hemoglobin 9.5 (L) 11.7 - 15.7 g/dL    Hematocrit 29.6 (L) 35.0 - 47.0 %    MCV 85 78 - 100 fl    MCH 27.2 26.5 - 33.0 pg    MCHC 32.1 31.5 - 36.5 g/dL    RDW 13.5 10.0 - 15.0 %    Platelet Count 132 (L) 150 - 450 10e9/L    Diff Method Manual Differential     % Neutrophils 37.0 %    % Lymphocytes 43.0 %    % Monocytes 7.0 %    % Eosinophils 6.0 %     % Basophils 3.0 %    % Metamyelocytes 3.0 %    % Myelocytes 1.0 %    Absolute Neutrophil 1.0 (L) 1.6 - 8.3 10e9/L    Absolute Lymphocytes 1.1 0.8 - 5.3 10e9/L    Absolute Monocytes 0.2 0.0 - 1.3 10e9/L    Absolute Eosinophils 0.2 0.0 - 0.7 10e9/L    Absolute Basophils 0.1 0.0 - 0.2 10e9/L    Absolute Metamyelocytes 0.1 (H) 0 10e9/L    Absolute Myelocytes 0.0 0 10e9/L    Reactive Lymphs Present     RBC Morphology Consistent with reported results     Platelet Estimate Normal    Glucose by meter   Result Value Ref Range    Glucose 125 (H) 70 - 99 mg/dL   Glucose by meter   Result Value Ref Range    Glucose 165 (H) 70 - 99 mg/dL   Glucose by meter   Result Value Ref Range    Glucose 153 (H) 70 - 99 mg/dL

## 2017-07-26 NOTE — CONSULTS
"CLINICAL NUTRITION SERVICES  -  ASSESSMENT NOTE      Malnutrition: Does not meet criteria at this time though is at risk for decline        REASON FOR ASSESSMENT  Cheryl Patel is a 56 year old female seen by Registered Dietitian for Admission Nutrition Risk Screen - Reduced oral intake over the last month.      NUTRITION HISTORY  - Information obtained from patient.   - Patient with a h/o breast CA with mastectomy, currently on chemotherapy, DMII (on Metformin PTA).    - Cheryl avoids added sugars in her diet.  - Consumes meals BID (skips breakfast or may have coffee + banana).  Eating pattern has changed over the past ~month d/t chemotherapy.  Eating more \"snacks\" as opposed to meals.  Finds she has been \"forcing\" herself to eat, \"I eat because I know I have to, not because I'm hungry\".    - No use of oral supplements or protein powders - food focus.  - NKFA.       CURRENT NUTRITION ORDERS  Diet Order:     Mod CHO    Current Intake/Tolerance:  Good appetite/intake at breakfast meal, \"I was hungry\".       PHYSICAL FINDINGS  Observed  No overt fat or muscle wasting appreciated   Obtained from Chart/Interdisciplinary Team  No nutritionally pertinent     ANTHROPOMETRICS  Height: 5' 6\"  Weight: 75 kg (165#)  Body mass index is 25.97 kg/(m^2).  Weight Status:  Overweight BMI 25-29.9  IBW: 59.1 kg (130#)  % IBW: 127%  Weight History:  Wt Readings from Last 10 Encounters:   17 75.2 kg (165 lb 12.6 oz)   17 74.8 kg (165 lb)   - Patient reports 4# wt loss x 1 week as of yesterday, states UBW of 166#.  Not reflected in wt trending as above.     LABS  Labs reviewed:  - HgbA1C trendin2017 - 9.5%    MEDICATIONS  Medications reviewed:  - SSI    Dosing Weight 63.1 kg - adjusted weight    ASSESSED NUTRITION NEEDS PER APPROVED PRACTICE GUIDELINES:  Estimated Energy Needs: 5118-0767 kcals (25-30 Kcal/Kg)  Justification: maintenance  Estimated Protein Needs: 76-95 grams protein (1.2-1.5 g pro/Kg)  Justification: " "preservation of lean body mass  Estimated Fluid Needs: 3883-0242 mL (1 mL/Kcal)  Justification: maintenance and per provider pending fluid status    MALNUTRITION:  % Weight Loss:  Weight loss does not meet criteria for malnutrition   % Intake:  <75% for >/= 1 month (non-severe malnutrition)  Subcutaneous Fat Loss:  None observed  Muscle Loss:  None observed  Fluid Retention:  None noted    Malnutrition Diagnosis: Patient does not meet two of the above criteria necessary for diagnosing malnutrition though is at risk for decline    NUTRITION DIAGNOSIS:  Predicted suboptimal nutrient intake (energy/protein) related to potential for decline in oral intakes given reports of anorexia associated with chemotherapy, changes in meal frequency/portions.       NUTRITION INTERVENTIONS  Recommendations / Nutrition Prescription  Continue mod CHO diet.  Ok for protein-containing snacks between meals.  Patient would like food focus at this time and prefers to avoid use of oral supplements (Glucerna).        Implementation  Nutrition education: Provided education on importance of protein/small-frequent meals:    Assessed learning needs, learning preferences, and willingness to learn    Nutrition Education (Content):  a) Provided handout \"6 small meals per day\"  b) Discussed foods which contain protein, importance of oral intakes for overall strength/wt maintenance  c) Specifically discussed snack ideas which also limit added sugars    Nutrition Education (Application):  a) Discussed eating habits and recommended alternative food choices    Patient verbalizes understanding of diet by stating 3 food items she enjoys which contain protein    Anticipate good compliance    Diet Education - refer to Education Flowsheet    Collaboration and Referral of Nutrition care: Discussed POC with team during rounds.       Nutrition Goals  Patient to consume 50-75% of meals.       MONITORING AND EVALUATION:  Progress towards goals will be monitored " and evaluated per protocol and Practice Guidelines        Kasandra Wong RD, LD  Clinical Dietitian  3rd floor/ICU: [phone]  All other floors: [phone]  Weekend/holiday: [phone]

## 2017-07-26 NOTE — PHARMACY-ADMISSION MEDICATION HISTORY
Admission medication history interview status for this patient is complete. See [facility] admission navigator for allergy information, prior to admission medications and immunization status.     Medication history interview source(s):Patient  Medication history resources (including written lists, pill bottles, clinic record):[facility]  Primary pharmacy:[facility] [place]    Changes made to [facility] medication list:  Added: None  Deleted: None  Changed: None    Actions taken by pharmacist (provider contacted, etc):None     Additional medication history information:None    Medication reconciliation/reorder completed by provider prior to medication history? No    Do you take OTC medications (eg tylenol, ibuprofen, fish oil, eye/ear drops, etc)? N    Prior to Admission medications    Medication Sig Last Dose Taking? Auth Provider   atorvastatin (LIPITOR) 20 MG tablet Take 1 tablet (20 mg) by mouth daily 7/24/2017 at pm Yes Kareem Swenson MD   escitalopram (LEXAPRO) 20 MG tablet Take 1 tablet (20 mg) by mouth daily 7/24/2017 at pm Yes Kareem Swenson MD   lisinopril (PRINIVIL/ZESTRIL) 20 MG tablet Take 1 tablet (20 mg) by mouth daily 7/24/2017 at pm Yes Kareem Swenson MD   METFORMIN HCL PO Take 1,000 mg by mouth 2 times daily (with meals) 7/25/2017 at x1 Yes Reported, Patient

## 2017-07-27 VITALS
HEART RATE: 66 BPM | BODY MASS INDEX: 25.97 KG/M2 | TEMPERATURE: 98 F | OXYGEN SATURATION: 94 % | WEIGHT: 165.79 LBS | SYSTOLIC BLOOD PRESSURE: 122 MMHG | DIASTOLIC BLOOD PRESSURE: 57 MMHG | RESPIRATION RATE: 18 BRPM

## 2017-07-27 PROBLEM — C50.111 MALIGNANT NEOPLASM OF CENTRAL PORTION OF RIGHT BREAST IN FEMALE, ESTROGEN RECEPTOR POSITIVE (H): Status: ACTIVE | Noted: 2017-07-27

## 2017-07-27 PROBLEM — Z17.0 MALIGNANT NEOPLASM OF CENTRAL PORTION OF RIGHT BREAST IN FEMALE, ESTROGEN RECEPTOR POSITIVE (H): Status: ACTIVE | Noted: 2017-07-27

## 2017-07-27 LAB
BACTERIA SPEC CULT: NORMAL
BASOPHILS # BLD AUTO: 0.1 10E9/L (ref 0–0.2)
BASOPHILS NFR BLD AUTO: 1 %
DIFFERENTIAL METHOD BLD: ABNORMAL
DOHLE BOD BLD QL SMEAR: PRESENT
ELLIPTOCYTES BLD QL SMEAR: SLIGHT
EOSINOPHIL # BLD AUTO: 0 10E9/L (ref 0–0.7)
EOSINOPHIL NFR BLD AUTO: 0 %
ERYTHROCYTE [DISTWIDTH] IN BLOOD BY AUTOMATED COUNT: 13.6 % (ref 10–15)
GLUCOSE BLDC GLUCOMTR-MCNC: 120 MG/DL (ref 70–99)
GLUCOSE BLDC GLUCOMTR-MCNC: 142 MG/DL (ref 70–99)
HCT VFR BLD AUTO: 30.3 % (ref 35–47)
HGB BLD-MCNC: 9.6 G/DL (ref 11.7–15.7)
HYPOCHROMIA BLD QL: PRESENT
LYMPHOCYTES # BLD AUTO: 2.3 10E9/L (ref 0.8–5.3)
LYMPHOCYTES NFR BLD AUTO: 25 %
MCH RBC QN AUTO: 27.4 PG (ref 26.5–33)
MCHC RBC AUTO-ENTMCNC: 31.7 G/DL (ref 31.5–36.5)
MCV RBC AUTO: 86 FL (ref 78–100)
METAMYELOCYTES # BLD: 0.8 10E9/L
METAMYELOCYTES NFR BLD MANUAL: 9 %
MICRO REPORT STATUS: NORMAL
MONOCYTES # BLD AUTO: 0.9 10E9/L (ref 0–1.3)
MONOCYTES NFR BLD AUTO: 10 %
MYELOCYTES # BLD: 0.2 10E9/L
MYELOCYTES NFR BLD MANUAL: 2 %
NEUTROPHILS # BLD AUTO: 4.8 10E9/L (ref 1.6–8.3)
NEUTROPHILS NFR BLD AUTO: 52 %
NRBC # BLD AUTO: 0.1 10*3/UL
NRBC BLD AUTO-RTO: 1 /100
PLATELET # BLD AUTO: 152 10E9/L (ref 150–450)
PLATELET # BLD EST: NORMAL 10*3/UL
POLYCHROMASIA BLD QL SMEAR: SLIGHT
RBC # BLD AUTO: 3.51 10E12/L (ref 3.8–5.2)
SPECIMEN SOURCE: NORMAL
TOXIC GRANULES BLD QL SMEAR: PRESENT
VARIANT LYMPHS BLD QL SMEAR: PRESENT
WBC # BLD AUTO: 9 10E9/L (ref 4–11)

## 2017-07-27 PROCEDURE — 99238 HOSP IP/OBS DSCHRG MGMT 30/<: CPT | Performed by: INTERNAL MEDICINE

## 2017-07-27 PROCEDURE — 25000128 H RX IP 250 OP 636: Performed by: INTERNAL MEDICINE

## 2017-07-27 PROCEDURE — 25000132 ZZH RX MED GY IP 250 OP 250 PS 637: Performed by: INTERNAL MEDICINE

## 2017-07-27 PROCEDURE — 00000146 ZZHCL STATISTIC GLUCOSE BY METER IP

## 2017-07-27 PROCEDURE — 85025 COMPLETE CBC W/AUTO DIFF WBC: CPT | Performed by: INTERNAL MEDICINE

## 2017-07-27 RX ORDER — LISINOPRIL 20 MG/1
10 TABLET ORAL DAILY
Qty: 90 TABLET | Refills: 3
Start: 2017-07-27 | End: 2018-05-14

## 2017-07-27 RX ADMIN — LISINOPRIL 10 MG: 10 TABLET ORAL at 10:32

## 2017-07-27 RX ADMIN — SODIUM CHLORIDE, PRESERVATIVE FREE 5 ML: 5 INJECTION INTRAVENOUS at 11:28

## 2017-07-27 RX ADMIN — NAPROXEN 250 MG: 250 TABLET ORAL at 06:21

## 2017-07-27 RX ADMIN — SODIUM CHLORIDE: 9 INJECTION, SOLUTION INTRAVENOUS at 04:57

## 2017-07-27 RX ADMIN — CEFEPIME 2 G: 2 INJECTION, POWDER, FOR SOLUTION INTRAMUSCULAR; INTRAVENOUS at 10:32

## 2017-07-27 RX ADMIN — DIPHENHYDRAMINE HYDROCHLORIDE 25 MG: 25 CAPSULE ORAL at 06:21

## 2017-07-27 ASSESSMENT — PAIN DESCRIPTION - DESCRIPTORS: DESCRIPTORS: ACHING

## 2017-07-27 NOTE — DISCHARGE SUMMARY
[facility] Discharge Summary    Cheryl Patel MRN# [id number]   Age: 56 year old YOB: 1960     Date of Admission:  7/25/2017  Date of Discharge::  7/27/2017  Admitting Physician:  Braydon Rojas MD  Discharge Physician:  Iain Ward MD     Home clinic: [facility]          Admission Diagnoses:   Neutropenic fever (H) [D70.9, R50.81]          Discharge Diagnosis:   Principal Problem:    Neutropenic fever (H)  Active Problems:    Malignant neoplasm of central portion of right breast in female, estrogen receptor positive (H)            Procedures:   CXR          Medications Prior to Admission:     Prescriptions Prior to Admission   Medication Sig Dispense Refill Last Dose     atorvastatin (LIPITOR) 20 MG tablet Take 1 tablet (20 mg) by mouth daily 90 tablet 3 7/24/2017 at pm     escitalopram (LEXAPRO) 20 MG tablet Take 1 tablet (20 mg) by mouth daily 90 tablet 3 7/24/2017 at pm     METFORMIN HCL PO Take 1,000 mg by mouth 2 times daily (with meals)   7/25/2017 at x1             Discharge Medications:     Current Discharge Medication List      CONTINUE these medications which have CHANGED    Details   lisinopril (PRINIVIL/ZESTRIL) 20 MG tablet Take 0.5 tablets (10 mg) by mouth daily  Qty: 90 tablet, Refills: 3    Associated Diagnoses: Benign essential hypertension         CONTINUE these medications which have NOT CHANGED    Details   atorvastatin (LIPITOR) 20 MG tablet Take 1 tablet (20 mg) by mouth daily  Qty: 90 tablet, Refills: 3    Associated Diagnoses: Hyperlipidemia LDL goal <100      escitalopram (LEXAPRO) 20 MG tablet Take 1 tablet (20 mg) by mouth daily  Qty: 90 tablet, Refills: 3    Associated Diagnoses: Mild recurrent major depression (H)      METFORMIN HCL PO Take 1,000 mg by mouth 2 times daily (with meals)                   Consultations:   Consultation during this admission received from oncology           Hospital Course:   Cheryl Patel is a 56  year old female with recent diagnosis of stage IIB breast cancer (right-sided lesion 2.5 cm diameter and fully resected with 2/7 lymph nodes positive, ER/[place] positive, Her-2) who came to attention on 7/25/17 with fevers and malaise. She is being managed by Dr. Pinzon and her recent hx is significant for right mastectomy in June, 2017 and initiation of chemotherapy on 7/18/17 with Adriamycin and Cytoxan.       Upon evaluation in the ED, Ms. Patel was found to be neutropenic with ANC of 200. She was empirically covered with Cefepime and admitted for further monitoring and management.     Ms. Patel did well without developing further fevers or evidence of systemic infection. Her WBC quickly recovered (she had received a dose of depo-Filgrastim just after the chemotherapy) and she began to feel stronger. She did well with Diphenhydramine for management of her back pain, which was thought to be a side effect of the Neulasta.     /57 (BP Location: Left arm)  Pulse 66  Temp 98  F (36.7  C) (Oral)  Resp 18  Wt 75.2 kg (165 lb 12.6 oz)  LMP 07/18/2017  SpO2 94%  BMI 25.97 kg/m2  At the time of discharge, Ms. Patel is alert and comfortable in NAD breathing ambient air. We discussed indications to return to the hospital.           Discharge Instructions and Follow-Up:   Discharge diet: Regular   Discharge activity: Activity as tolerated   Discharge follow-up: Follow up with Luther Pinzon and Marie as planned.            Discharge Disposition:   Discharged to home      Attestation:  I have reviewed today's vital signs, notes, medications, labs and imaging.  Total time: 25 minutes    Iain Ward MD

## 2017-07-27 NOTE — PROGRESS NOTES
Oncology/Hematology Follow Up Note:    Assessment and Plan:    #1 Neutropenic fever, chemotherapy-related  No longer Neutropenic.  CXR and UA negative.  Blood cultures remain negative.  Cefepime could be discontinued.     #2 Low back and hip pain  Related to chemotherapy and Neulasta injection.  Benadryl worked well per patient.     #3 Follow-up  Will follow up with Dr. Pinzon in clinic next Tuesday prior to cycle #2 of chemotherapy.  Will discuss additional supportive medications and/or dose reduction with Dr. Pinzon at that time.     Appreciate excellent care by hospitalist.  We will continue to follow along while the patient is in the hospital.     Talib Salamanca M.D.  [facility]      Subjective:    Remains afebrile.  No nausea or vomiting, but feels more fatigued today, which she attributes to poor sleep quality overnight.  Thinks Benadryl helped her more than the narcotic pain medications for hip/back pain related to Neulasta use.    Scheduled Medications:  Reviewed active medications    Labs:  CBC RESULTS:   Recent Labs   Lab Test  07/27/17   0619  07/26/17   0630  07/25/17   1815   WBC  9.0  2.6*  1.4*   HGB  9.6*  9.5*  10.7*   HCT  30.3*  29.6*  33.6*   MCV  86  85  85   PLT  152  132*  160       CMP  Recent Labs  Lab 07/26/17  0630 07/25/17  1815    138   POTASSIUM 3.8 3.9   CHLORIDE 108 104   CO2 28 27   ANIONGAP 6 7   * 140*   BUN 6* 8   CR 0.52 0.56   GFRESTIMATED >90Non  GFR Calc >90Non  GFR Calc   GFRESTBLACK >90African American GFR Calc >90African American GFR Calc   PRO 8.3* 9.2   MAG 1.6  --    PROTTOTAL  --  6.8   ALBUMIN  --  3.2*   BILITOTAL  --  0.4   ALKPHOS  --  113   AST  --  9   ALT  --  21       INRNo lab results found in last 7 days.    Objective/Physical Exam:  Blood pressure 129/50, pulse 78, temperature 97.7  F (36.5  C), temperature source Oral, resp. rate 16, weight 75.2 kg (165 lb 12.6 oz), last menstrual period 07/18/2017, SpO2 93  %.  General appearance:alert, oriented, no acute distress.  Appears fatigued.  HEENT:sclera anicteric, no pallor, no thrush or mucositis.  Chest:  PORT site examined.  Does not appear infected.  Abdomen: soft, NT, ND , BS normal  Ext: no edema or rashes    Talib Salamanca MD  [facility]  7/27/2017 9:31 AM

## 2017-07-31 ENCOUNTER — TRANSFERRED RECORDS (OUTPATIENT)
Dept: HEALTH INFORMATION MANAGEMENT | Facility: CLINIC | Age: 57
End: 2017-07-31

## 2017-07-31 LAB
BACTERIA SPEC CULT: NO GROWTH
BACTERIA SPEC CULT: NO GROWTH
Lab: NORMAL
Lab: NORMAL
MICRO REPORT STATUS: NORMAL
MICRO REPORT STATUS: NORMAL
SPECIMEN SOURCE: NORMAL
SPECIMEN SOURCE: NORMAL

## 2017-08-03 ENCOUNTER — OFFICE VISIT (OUTPATIENT)
Dept: INTERNAL MEDICINE | Facility: CLINIC | Age: 57
End: 2017-08-03
Payer: OTHER GOVERNMENT

## 2017-08-03 VITALS
TEMPERATURE: 97.7 F | OXYGEN SATURATION: 97 % | BODY MASS INDEX: 25.6 KG/M2 | HEIGHT: 67 IN | HEART RATE: 100 BPM | SYSTOLIC BLOOD PRESSURE: 124 MMHG | DIASTOLIC BLOOD PRESSURE: 72 MMHG | WEIGHT: 163.1 LBS

## 2017-08-03 DIAGNOSIS — E11.9 TYPE 2 DIABETES MELLITUS WITHOUT COMPLICATION, WITHOUT LONG-TERM CURRENT USE OF INSULIN (H): ICD-10-CM

## 2017-08-03 DIAGNOSIS — Z09 HOSPITAL DISCHARGE FOLLOW-UP: Primary | ICD-10-CM

## 2017-08-03 DIAGNOSIS — Z17.0 MALIGNANT NEOPLASM OF CENTRAL PORTION OF RIGHT BREAST IN FEMALE, ESTROGEN RECEPTOR POSITIVE (H): ICD-10-CM

## 2017-08-03 DIAGNOSIS — D70.9 NEUTROPENIC FEVER (H): ICD-10-CM

## 2017-08-03 DIAGNOSIS — R50.81 NEUTROPENIC FEVER (H): ICD-10-CM

## 2017-08-03 DIAGNOSIS — C50.111 MALIGNANT NEOPLASM OF CENTRAL PORTION OF RIGHT BREAST IN FEMALE, ESTROGEN RECEPTOR POSITIVE (H): ICD-10-CM

## 2017-08-03 DIAGNOSIS — Z71.89 ADVANCED DIRECTIVES, COUNSELING/DISCUSSION: ICD-10-CM

## 2017-08-03 DIAGNOSIS — E78.5 HYPERLIPIDEMIA LDL GOAL <130: ICD-10-CM

## 2017-08-03 DIAGNOSIS — I10 HTN, GOAL BELOW 140/90: ICD-10-CM

## 2017-08-03 PROCEDURE — 99214 OFFICE O/P EST MOD 30 MIN: CPT | Performed by: INTERNAL MEDICINE

## 2017-08-03 NOTE — PATIENT INSTRUCTIONS
Plan:  1. Schedule an annual exam   2. Continue same meds, same doses for now   3. Please make a lab appointment for fasting labs   4. Please make an appointment few days after the labs to discuss about the results and do physical exam

## 2017-08-03 NOTE — PROGRESS NOTES
Dr Salazar's note      Patient's instructions / PLAN:                                                        Plan:  1. Schedule an annual exam   2. Continue same meds, same doses for now   3. Please make a lab appointment for fasting labs   4. Please make an appointment few days after the labs to discuss about the results and do physical exam         ASSESSMENT & PLAN:                                                      (Z09) Hospital discharge follow-up  (primary encounter diagnosis)  (D70.9,  R50.81) Neutropenic fever (H)  Comment: Resolved  Plan:     (C50.111,  Z17.0) Malignant neoplasm of central portion of right breast in female, estrogen receptor positive (H)  Comment: Under chemotherapy  Plan: Follow-up with oncology    (E11.9) Type 2 diabetes mellitus without complication, without long-term current use of insulin (H)  Comment: Controlled  Plan: metFORMIN (GLUCOPHAGE) 1000 MG tablet,         Hepatitis C Screen Reflex to HCV RNA Quant and         Genotype, Lipid panel reflex to direct LDL,         Comprehensive metabolic panel, TSH with free T4        reflex            (E78.5) Hyperlipidemia LDL goal <130  Comment:   Plan: metFORMIN (GLUCOPHAGE) 1000 MG tablet,         Hepatitis C Screen Reflex to HCV RNA Quant and         Genotype, Lipid panel reflex to direct LDL,         Comprehensive metabolic panel, TSH with free T4        reflex            (I10) HTN, goal below 140/90  Comment: Controlled    Plan: metFORMIN (GLUCOPHAGE) 1000 MG tablet,         Hepatitis C Screen Reflex to HCV RNA Quant and         Genotype, Lipid panel reflex to direct LDL,         Comprehensive metabolic panel, TSH with free T4        reflex            (Z71.89) Advanced directives, counseling/discussion  Comment:   Plan: metFORMIN (GLUCOPHAGE) 1000 MG tablet             Chief complaint:                                                      hosp f/u     SUBJECTIVE:                                                    Cheryl Patel is  "a 56 year old female who presents to clinic today for the following health issues:      Pt thinks she is up-to-date on her pap smear, knows she is up-to-date on her mammogram. Records have been requested from her previous provider.     *Establish Care-Hx of diabetes, breast cancer, will be meeting with Dr. Pinzon in about 2 weeks.   *Hospital F/U-She just started chemo about 2 weeks ago, had her second round on Tuesday 8/1/2017. Right side mastectomy about 8 weeks ago, with 7 lymph nodes removed (2 cancerous). PET scan was negative. No lymphedema     DM on Metformin 2000 mg daily     In the hospital 7/25 - 7/27 with neutropenic fever   Feeling much better      Review of Systems:                                                      ROS: negative for fever, chills, cough, wheezes, chest pain, shortness of breath, vomiting, abdominal pain, leg swelling     A 10-point review of systems was obtained.  Those pertinent are above and in the in the Subjective section.  The rest of the systems are negative.           OBJECTIVE:             Physical exam:  Blood pressure 124/72, pulse 100, temperature 97.7  F (36.5  C), temperature source Oral, height 5' 7\" (1.702 m), weight 163 lb 1.6 oz (74 kg), last menstrual period 07/18/2017, SpO2 97 %, not currently breastfeeding.   NAD, appears comfortable  Skin: no rashes   HEENT: PERRLA, EOMI, pink conjunctiva, anicteric sclerae, bilateral tympanic membranes are clinically normal, oropharynx is normal color  Neck: supple, no JVD,  No thyroidmegaly. Lymph nodes nonpalpable cervical and supraclavicular.  Chest: clear to auscultation bilaterally, good respiratory effort  Heart: S1 S2, RRR, no mgr appreciated  Abdomen: soft, not tender, no hepatosplenomegaly or masses appreciated, no abdominal bruit, present bowel sounds  Extremities: no edema,   Neurologic: A, Ox3, no focal signs appreciated    PMHx: reviewed  Past Medical History:   Diagnosis Date     Cancer (H)      Diabetes (H)     "   PSHx: reviewed  Past Surgical History:   Procedure Laterality Date     BIOPSY       BREAST SURGERY       MASTECTOMY          Meds: reviewed  Current Outpatient Prescriptions   Medication Sig Dispense Refill     lisinopril (PRINIVIL/ZESTRIL) 20 MG tablet Take 0.5 tablets (10 mg) by mouth daily 90 tablet 3     atorvastatin (LIPITOR) 20 MG tablet Take 1 tablet (20 mg) by mouth daily 90 tablet 3     escitalopram (LEXAPRO) 20 MG tablet Take 1 tablet (20 mg) by mouth daily 90 tablet 3     METFORMIN HCL PO Take 1,000 mg by mouth 2 times daily (with meals)         Soc Hx: reviewed  Fam Hx: reviewed          Archana Salazar MD  Internal Medicine          Hospital Follow-up Visit:    Hospital/Nursing Home/IP Rehab Facility: [facility] [place]  Date of Admission: 7/25/17  Date of Discharge: 7/27/17  Reason(s) for Admission: Principal Problem:    Neutropenic fever (H)  Active Problems:    Malignant neoplasm of central portion of right breast in female, estrogen receptor positive (H)            Problems taking medications regularly:  None       Medication changes since discharge: None       Problems adhering to non-medication therapy:  None    Summary of hospitalization:  [facility] discharge summary reviewed  Diagnostic Tests/Treatments reviewed.  Follow up needed: as above   Other Healthcare Providers Involved in Patient s Care:         oncology  Update since discharge: improved.     Post Discharge Medication Reconciliation: discharge medications reconciled and changed, per note/orders (see AVS).  Plan of care communicated with patient     Coding guidelines for this visit:  Type of Medical   Decision Making Face-to-Face Visit       within 7 Days of discharge Face-to-Face Visit        within 14 days of discharge   Moderate Complexity [id number]   High Complexity [id number]

## 2017-08-03 NOTE — MR AVS SNAPSHOT
After Visit Summary   8/3/2017    Cheryl Patel    MRN: [id number]           Patient Information     Date Of Birth          1960        Visit Information        Provider Department      8/3/2017 3:20 PM Archana Chen MD [facility]        Today's Diagnoses     Type 2 diabetes mellitus without complication, without long-term current use of insulin (H)    -  1    Hyperlipidemia LDL goal <130        HTN, goal below 140/90        Advanced directives, counseling/discussion          Care Instructions    Plan:  1. Schedule an annual exam   2. Continue same meds, same doses for now   3. Please make a lab appointment for fasting labs   4. Please make an appointment few days after the labs to discuss about the results and do physical exam           Follow-ups after your visit        Your next 10 appointments already scheduled     Aug 10, 2017 10:00 AM CDT   [facility] with [id number] ([facility])    [address]  [facility] [id number] [phone]           1.  Please bring or wear a comfortable two-piece outfit. 2.  You may eat, drink and take your normal medicines. 3.  For any questions that cannot be answered, please contact the ordering physician ***Please check-in at the [facility] located in [address] in the [facility] building. When you are finished registering, please go to [address] and have a seat. The technician will call your name for the test.              Future tests that were ordered for you today     Open Future Orders        Priority Expected Expires Ordered    Hepatitis C Screen Reflex to HCV RNA Quant and Genotype Routine  8/3/2018 8/3/2017    Lipid panel reflex to direct LDL Routine  8/3/2018 8/3/2017    Comprehensive metabolic panel Routine  8/3/2018 8/3/2017    TSH with free T4 reflex Routine  8/3/2018 8/3/2017            Who to  "contact     If you have questions or need follow up information about today's clinic visit or your schedule please contact [facility] directly at [phone].  Normal or non-critical lab and imaging results will be communicated to you by MyChart, letter or phone within 4 business days after the clinic has received the results. If you do not hear from us within 7 days, please contact the clinic through MyChart or phone. If you have a critical or abnormal lab result, we will notify you by phone as soon as possible.  Submit refill requests through [employer] or call your pharmacy and they will forward the refill request to us. Please allow 3 business days for your refill to be completed.          Additional Information About Your Visit        [facility] Information     [employer] lets you send messages to your doctor, view your test results, renew your prescriptions, schedule appointments and more. To sign up, go to [email or web address] . Click on \"Log in\" on the left side of the screen, which will take you to the Welcome page. Then click on \"Sign up Now\" on the right side of the page.     You will be asked to enter the access code listed below, as well as some personal information. Please follow the directions to create your username and password.     Your access code is: [email or web address]  Expires: 10/9/2017 10:58 AM     Your access code will  in 90 days. If you need help or a new code, please call your [facility] clinic or [phone].        Care EveryWhere ID     This is your Care EveryWhere ID. This could be used by other organizations to access your [facility] medical records  [id number]        Your Vitals Were     Pulse Temperature Height Last Period Pulse Oximetry Breastfeeding?    100 97.7  F (36.5  C) (Oral) 5' 7\" (1.702 m) 2017 97% No    BMI (Body Mass Index)                   25.55 kg/m2            Blood Pressure from Last 3 Encounters:   17 124/72   17 122/57   17 " 138/76    Weight from Last 3 Encounters:   08/03/17 163 lb 1.6 oz (74 kg)   07/25/17 165 lb 12.6 oz (75.2 kg)   07/14/17 165 lb (74.8 kg)                 Today's Medication Changes          These changes are accurate as of: 8/3/17  4:19 PM.  If you have any questions, ask your nurse or doctor.               These medicines have changed or have updated prescriptions.        Dose/Directions    * METFORMIN HCL PO   This may have changed:  Another medication with the same name was added. Make sure you understand how and when to take each.        Dose:  1000 mg   Take 1,000 mg by mouth 2 times daily (with meals)   Refills:  0       * metFORMIN 1000 MG tablet   Commonly known as:  GLUCOPHAGE   This may have changed:  You were already taking a medication with the same name, and this prescription was added. Make sure you understand how and when to take each.   Used for:  Type 2 diabetes mellitus without complication, without long-term current use of insulin (H), Advanced directives, counseling/discussion, Hyperlipidemia LDL goal <130, HTN, goal below 140/90   Changed by:  Archana Chen MD        Dose:  1000 mg   Take 1 tablet (1,000 mg) by mouth 2 times daily (with meals)   Quantity:  180 tablet   Refills:  1       * Notice:  This list has 2 medication(s) that are the same as other medications prescribed for you. Read the directions carefully, and ask your doctor or other care provider to review them with you.         Where to get your medicines      These medications were sent to [facility] [id number]     Phone:  [phone]     metFORMIN 1000 MG tablet                Primary Care Provider Office Phone # Fax #    Ina Pinzon [phone]       [facility]  [address] [place] [address]        Equal Access to Services     CHILANGO LY AH: Vale Hadley  waaxda luqadaha, qaybta kaalmada cheng, migdalia smileyaaligia ah. So [facility] [phone].    ATENCIÓN: Si jeremie mcclelland, tiene a zaidi disposición servicios gratuitos de asistencia lingüística. Lauren al [phone].    We comply with applicable federal civil rights laws and [place] laws. We do not discriminate on the basis of race, color, national origin, age, disability sex, sexual orientation or gender identity.            Thank you!     Thank you for choosing [facility]  for your care. Our goal is always to provide you with excellent care. Hearing back from our patients is one way we can continue to improve our services. Please take a few minutes to complete the written survey that you may receive in the mail after your visit with us. Thank you!             Your Updated Medication List - Protect others around you: Learn how to safely use, store and throw away your medicines at www.disposemymeds.org.          This list is accurate as of: 8/3/17  4:19 PM.  Always use your most recent med list.                   Brand Name Dispense Instructions for use Diagnosis    atorvastatin 20 MG tablet    LIPITOR    90 tablet    Take 1 tablet (20 mg) by mouth daily    Hyperlipidemia LDL goal <100       escitalopram 20 MG tablet    LEXAPRO    90 tablet    Take 1 tablet (20 mg) by mouth daily    Mild recurrent major depression (H)       lisinopril 20 MG tablet    PRINIVIL/ZESTRIL    90 tablet    Take 0.5 tablets (10 mg) by mouth daily    Benign essential hypertension       * METFORMIN HCL PO      Take 1,000 mg by mouth 2 times daily (with meals)        * metFORMIN 1000 MG tablet    GLUCOPHAGE    180 tablet    Take 1 tablet (1,000 mg) by mouth 2 times daily (with meals)    Type 2 diabetes mellitus without complication, without long-term current use of insulin (H), Advanced directives, counseling/discussion, Hyperlipidemia LDL goal <130, HTN, goal below 140/90       * Notice:  This list has 2  medication(s) that are the same as other medications prescribed for you. Read the directions carefully, and ask your doctor or other care provider to review them with you.

## 2017-08-03 NOTE — NURSING NOTE
"Chief Complaint   Patient presents with     [facility] F/U       Initial /72 (BP Location: Right arm, Patient Position: Chair, Cuff Size: Adult Large)  Pulse 100  Temp 97.7  F (36.5  C) (Oral)  Ht 5' 7\" (1.702 m)  Wt 163 lb 1.6 oz (74 kg)  LMP 07/18/2017  SpO2 97%  Breastfeeding? No  BMI 25.55 kg/m2 Estimated body mass index is 25.55 kg/(m^2) as calculated from the following:    Height as of this encounter: 5' 7\" (1.702 m).    Weight as of this encounter: 163 lb 1.6 oz (74 kg).  Medication Reconciliation: complete   Lea Gilman CMA      "

## 2017-08-10 ENCOUNTER — HOSPITAL ENCOUNTER (OUTPATIENT)
Dept: CARDIOLOGY | Facility: CLINIC | Age: 57
Discharge: HOME | End: 2017-08-10
Attending: INTERNAL MEDICINE | Admitting: INTERNAL MEDICINE
Payer: OTHER GOVERNMENT

## 2017-08-10 DIAGNOSIS — Z92.21 STATUS POST CHEMOTHERAPY: ICD-10-CM

## 2017-08-10 PROCEDURE — 93308 TTE F-UP OR LMTD: CPT

## 2017-08-10 PROCEDURE — 0399T ZZHC MYOCARDIAL STRAIN IMAGING: CPT | Performed by: INTERNAL MEDICINE

## 2017-08-10 PROCEDURE — 93321 DOPPLER ECHO F-UP/LMTD STD: CPT | Mod: 26 | Performed by: INTERNAL MEDICINE

## 2017-08-10 PROCEDURE — 93308 TTE F-UP OR LMTD: CPT | Mod: 26 | Performed by: INTERNAL MEDICINE

## 2017-08-10 PROCEDURE — 93325 DOPPLER ECHO COLOR FLOW MAPG: CPT | Mod: 26 | Performed by: INTERNAL MEDICINE

## 2017-08-11 ENCOUNTER — TELEPHONE (OUTPATIENT)
Dept: CARDIOLOGY | Facility: CLINIC | Age: 57
End: 2017-08-11

## 2017-08-11 DIAGNOSIS — E78.5 HYPERLIPIDEMIA LDL GOAL <130: ICD-10-CM

## 2017-08-11 DIAGNOSIS — Z92.21 STATUS POST CHEMOTHERAPY: Primary | ICD-10-CM

## 2017-08-11 DIAGNOSIS — E11.9 TYPE 2 DIABETES MELLITUS WITHOUT COMPLICATION, WITHOUT LONG-TERM CURRENT USE OF INSULIN (H): ICD-10-CM

## 2017-08-11 DIAGNOSIS — R73.9 ELEVATED BLOOD SUGAR: Primary | ICD-10-CM

## 2017-08-11 DIAGNOSIS — I10 HTN, GOAL BELOW 140/90: ICD-10-CM

## 2017-08-11 LAB
ALBUMIN SERPL-MCNC: 3.5 G/DL (ref 3.4–5)
ALP SERPL-CCNC: 132 U/L (ref 40–150)
ALT SERPL W P-5'-P-CCNC: 22 U/L (ref 0–50)
ANION GAP SERPL CALCULATED.3IONS-SCNC: 9 MMOL/L (ref 3–14)
AST SERPL W P-5'-P-CCNC: 15 U/L (ref 0–45)
BILIRUB SERPL-MCNC: 0.2 MG/DL (ref 0.2–1.3)
BUN SERPL-MCNC: 7 MG/DL (ref 7–30)
CALCIUM SERPL-MCNC: 9.5 MG/DL (ref 8.5–10.1)
CHLORIDE SERPL-SCNC: 106 MMOL/L (ref 94–109)
CHOLEST SERPL-MCNC: 166 MG/DL
CO2 SERPL-SCNC: 25 MMOL/L (ref 20–32)
CREAT SERPL-MCNC: 0.62 MG/DL (ref 0.52–1.04)
GFR SERPL CREATININE-BSD FRML MDRD: ABNORMAL ML/MIN/1.7M2
GLUCOSE SERPL-MCNC: 152 MG/DL (ref 70–99)
HCV AB SERPL QL IA: NORMAL
HDLC SERPL-MCNC: 51 MG/DL
LDLC SERPL CALC-MCNC: 87 MG/DL
NONHDLC SERPL-MCNC: 115 MG/DL
POTASSIUM SERPL-SCNC: 4 MMOL/L (ref 3.4–5.3)
PROT SERPL-MCNC: 6.9 G/DL (ref 6.8–8.8)
SODIUM SERPL-SCNC: 140 MMOL/L (ref 133–144)
TRIGL SERPL-MCNC: 138 MG/DL
TSH SERPL DL<=0.005 MIU/L-ACNC: 1.53 MU/L (ref 0.4–4)

## 2017-08-11 PROCEDURE — 84443 ASSAY THYROID STIM HORMONE: CPT | Performed by: INTERNAL MEDICINE

## 2017-08-11 PROCEDURE — 36415 COLL VENOUS BLD VENIPUNCTURE: CPT | Performed by: INTERNAL MEDICINE

## 2017-08-11 PROCEDURE — 80061 LIPID PANEL: CPT | Performed by: INTERNAL MEDICINE

## 2017-08-11 PROCEDURE — 80053 COMPREHEN METABOLIC PANEL: CPT | Performed by: INTERNAL MEDICINE

## 2017-08-11 PROCEDURE — 86803 HEPATITIS C AB TEST: CPT | Performed by: INTERNAL MEDICINE

## 2017-08-11 NOTE — TELEPHONE ENCOUNTER
Notes Recorded by Kareem Swenson MD on 8/11/2017 at 9:24 AM  Heart looks okay, but would like to check it in one month to confirm.    Tried to call patient to review results above. No answer. Left VM for patient to call team 4 with direct number. Limited echo ordered for 1 month.

## 2017-08-15 ENCOUNTER — TRANSFERRED RECORDS (OUTPATIENT)
Dept: HEALTH INFORMATION MANAGEMENT | Facility: CLINIC | Age: 57
End: 2017-08-15

## 2017-08-15 NOTE — TELEPHONE ENCOUNTER
Spoke to patient about results below. Pt confirmed understanding and transferred to scheduling to make appointment for echo in 1 month. No further questions or concerns.

## 2017-08-17 ENCOUNTER — TELEPHONE (OUTPATIENT)
Dept: BONE DENSITY | Facility: CLINIC | Age: 57
End: 2017-08-17

## 2017-08-18 ENCOUNTER — RESULT FOLLOW UP (OUTPATIENT)
Dept: INTERNAL MEDICINE | Facility: CLINIC | Age: 57
End: 2017-08-18

## 2017-08-18 ENCOUNTER — OFFICE VISIT (OUTPATIENT)
Dept: INTERNAL MEDICINE | Facility: CLINIC | Age: 57
End: 2017-08-18
Payer: OTHER GOVERNMENT

## 2017-08-18 VITALS
WEIGHT: 160.5 LBS | HEART RATE: 78 BPM | OXYGEN SATURATION: 96 % | BODY MASS INDEX: 25.19 KG/M2 | DIASTOLIC BLOOD PRESSURE: 68 MMHG | TEMPERATURE: 98.4 F | HEIGHT: 67 IN | SYSTOLIC BLOOD PRESSURE: 110 MMHG

## 2017-08-18 DIAGNOSIS — E11.9 TYPE 2 DIABETES MELLITUS WITHOUT COMPLICATION, WITHOUT LONG-TERM CURRENT USE OF INSULIN (H): ICD-10-CM

## 2017-08-18 DIAGNOSIS — R87.612 PAPANICOLAOU SMEAR OF CERVIX WITH LOW GRADE SQUAMOUS INTRAEPITHELIAL LESION (LGSIL): ICD-10-CM

## 2017-08-18 DIAGNOSIS — Z17.0 MALIGNANT NEOPLASM OF CENTRAL PORTION OF RIGHT BREAST IN FEMALE, ESTROGEN RECEPTOR POSITIVE (H): ICD-10-CM

## 2017-08-18 DIAGNOSIS — N93.9 VAGINAL SPOTTING: ICD-10-CM

## 2017-08-18 DIAGNOSIS — Z00.00 ROUTINE GENERAL MEDICAL EXAMINATION AT A HEALTH CARE FACILITY: Primary | ICD-10-CM

## 2017-08-18 DIAGNOSIS — C50.111 MALIGNANT NEOPLASM OF CENTRAL PORTION OF RIGHT BREAST IN FEMALE, ESTROGEN RECEPTOR POSITIVE (H): ICD-10-CM

## 2017-08-18 DIAGNOSIS — F33.41 RECURRENT MAJOR DEPRESSIVE DISORDER, IN PARTIAL REMISSION (H): ICD-10-CM

## 2017-08-18 PROCEDURE — G0145 SCR C/V CYTO,THINLAYER,RESCR: HCPCS | Performed by: INTERNAL MEDICINE

## 2017-08-18 PROCEDURE — 99213 OFFICE O/P EST LOW 20 MIN: CPT | Mod: 25 | Performed by: INTERNAL MEDICINE

## 2017-08-18 PROCEDURE — G0124 SCREEN C/V THIN LAYER BY MD: HCPCS | Performed by: PATHOLOGY

## 2017-08-18 PROCEDURE — G0476 HPV COMBO ASSAY CA SCREEN: HCPCS | Performed by: INTERNAL MEDICINE

## 2017-08-18 PROCEDURE — 99396 PREV VISIT EST AGE 40-64: CPT | Performed by: INTERNAL MEDICINE

## 2017-08-18 RX ORDER — PROCHLORPERAZINE MALEATE 10 MG
10 TABLET ORAL EVERY 6 HOURS PRN
Qty: 20 TABLET | Refills: 1 | COMMUNITY
Start: 2017-08-18 | End: 2018-01-08

## 2017-08-18 RX ORDER — LORAZEPAM 0.5 MG/1
0.5 TABLET ORAL EVERY 8 HOURS PRN
Qty: 20 TABLET | Refills: 0 | COMMUNITY
Start: 2017-08-18 | End: 2018-01-08

## 2017-08-18 RX ORDER — BUPROPION HYDROCHLORIDE 150 MG/1
150 TABLET, EXTENDED RELEASE ORAL 2 TIMES DAILY
Qty: 180 TABLET | Refills: 1 | Status: SHIPPED | OUTPATIENT
Start: 2017-08-18 | End: 2018-01-08

## 2017-08-18 ASSESSMENT — ANXIETY QUESTIONNAIRES
6. BECOMING EASILY ANNOYED OR IRRITABLE: SEVERAL DAYS
IF YOU CHECKED OFF ANY PROBLEMS ON THIS QUESTIONNAIRE, HOW DIFFICULT HAVE THESE PROBLEMS MADE IT FOR YOU TO DO YOUR WORK, TAKE CARE OF THINGS AT HOME, OR GET ALONG WITH OTHER PEOPLE: SOMEWHAT DIFFICULT
5. BEING SO RESTLESS THAT IT IS HARD TO SIT STILL: NOT AT ALL
7. FEELING AFRAID AS IF SOMETHING AWFUL MIGHT HAPPEN: SEVERAL DAYS
1. FEELING NERVOUS, ANXIOUS, OR ON EDGE: SEVERAL DAYS
GAD7 TOTAL SCORE: 7
2. NOT BEING ABLE TO STOP OR CONTROL WORRYING: SEVERAL DAYS
3. WORRYING TOO MUCH ABOUT DIFFERENT THINGS: MORE THAN HALF THE DAYS

## 2017-08-18 ASSESSMENT — PATIENT HEALTH QUESTIONNAIRE - PHQ9
SUM OF ALL RESPONSES TO PHQ QUESTIONS 1-9: 14
5. POOR APPETITE OR OVEREATING: SEVERAL DAYS

## 2017-08-18 NOTE — LETTER
August 5, 2018      Cheryl VAZQUEZ Jorge  [address] [place] [address]    Dear MsJahairaJorge,      At [facility], your health and wellness is our primary concern. That is why we are following up on an abnormal pap from 8/18/17, which was reported as LSIL. Your provider had recommended that you have a Pap smear and HPV test completed by 8/18/18. Our records do not show that this has been scheduled.    It is important to complete the follow up that your provider has suggested for you to ensure that there are no worsening changes which may, over time, develop into cancer.      Please contact our office at  [phone] to schedule an appointment for a Pap smear and HPV test at your earliest convenience. If you have questions or concerns, please call the clinic and we will be happy to assist you.    If you have completed the tests outside of [facility], please have the results forwarded to our office. We will update the chart for your primary Physician to review before your next annual physical.     Thank you for choosing [facility]!    Sincerely,      Archana Chen MD/nano

## 2017-08-18 NOTE — NURSING NOTE
"Chief Complaint   Patient presents with     Physical     Labs done 8/11/17. LOV 8/3/17       Initial /68 (BP Location: Left arm, Patient Position: Chair, Cuff Size: Adult Large)  Pulse 78  Temp 98.4  F (36.9  C) (Oral)  Ht 5' 7\" (1.702 m)  Wt 160 lb 8 oz (72.8 kg)  LMP 07/18/2017  SpO2 96%  Breastfeeding? No  BMI 25.14 kg/m2 Estimated body mass index is 25.14 kg/(m^2) as calculated from the following:    Height as of this encounter: 5' 7\" (1.702 m).    Weight as of this encounter: 160 lb 8 oz (72.8 kg).  Medication Reconciliation: complete   Lea Gilman CMA      "

## 2017-08-18 NOTE — PROGRESS NOTES
Dr Salazar's note    Patient's instructions / PLAN:                                                        Plan:  1. Pelvic ultrasound - To schedule this test you may call Scheduling center at [phone]    2. Continue Lexapro 20 mg daily  3. Add Wellbutrin 150 daily for a week and then twice a day   4. You may take Lorazepam at bed time to help with the sleep and hot flushes   5. Follow up after you start the steroids   6. Hold Metformin when you feel sick         ASSESSMENT & PLAN:                                                      (Z00.00) Routine general medical examination at a health care facility  (primary encounter diagnosis)  Comment:   Plan: Pap imaged thin layer screen with HPV -         recommended age 30 - 65 years (select HPV order        below), HPV High Risk Types DNA Cervical            (C50.111,  Z17.0) Malignant neoplasm of central portion of right breast in female, estrogen receptor positive (H)  Comment:   Plan: f/u w dr Pinzon     (N92.0) Vaginal spotting  Comment:   Plan: US Pelvic Complete w Transvaginal            (F33.41) Recurrent major depressive disorder, in partial remission (H)  Comment: Not controlled  We discussed about the new meds, advantages and potential side effects. The patient will read also the info from the pharmacy and call back if questions.   Plan: buPROPion (WELLBUTRIN SR) 150 MG 12 hr tablet        Controlled      (E11.9) Type 2 diabetes mellitus without complication, without long-term current use of insulin (H)  Comment:   Plan: Albumin Random Urine Quantitative with Creat         Ratio               Chief Complaint:                                                        Annual exam    SUBJECTIVE:                                                    History of present illness     .    *Has had some vaginal spotting over the last few weeks (did not have a period for about 6 months until the day she started her chemo, lasted for 7 days at that time).     *Depression?  When she feels good, she is ok. But when she is not feeling well, she feels very depressed, antisocial.     *Breast cancer -She will be starting a course of steroids soon (prescribed by oncology)    ROS:   General: Negative for fever, chills, major weight changes, fatigue  Skin: Negative for rashes, abnormal spots  Eyes: Negative for blurred or double vision  ENT/mouth: Negative for sinuses discomfort, earache, sore throat  Respiratory: Negative for cough, wheezes, chronic lung disease  Cardiovascular: Negative for rest or exertional chest pain, shortness of breath, palpitations, leg edema,   Gastrointestinal: Negative for vomiting, abdominal pain, heartburn, blood in stool, diarrhea, constipation  Genitourinary: Negative for urinary frequency, blood in urine, history of kidney stones  Female: Negative for abnormal vaginal bleeding, vaginal discharge  Neuro: Negative for headaches, numbness, tingling, weakness in arms or legs, history of seizure, recent syncope  Psychiatry: Negative for depression, anxiety, suicidal thoughts  Endo: Negative for known thyroid disease, Pos for diabetes.  Hemato/Lymph: Negative for nodes, easy bleeding, history of DVT, blood transfusion  Musculoskeletal: Negative for joint swelling, back pain      PMHx: - reviewed  Past Medical History:   Diagnosis Date     Cancer (H)      Diabetes (H)        PSHx: reviewed  Past Surgical History:   Procedure Laterality Date     BIOPSY       BREAST SURGERY       MASTECTOMY          Soc Hx: No daily alcohol, no smoking  Social History     Social History     Marital status:      Spouse name: N/A     Number of children: N/A     Years of education: N/A     Occupational History     Not on file.     Social History Main Topics     Smoking status: Former Smoker     Types: Cigarettes     Quit date: 8/3/1997     Smokeless tobacco: Never Used     Alcohol use Yes      Comment: socially     Drug use: No     Sexual activity: Yes     Partners: Male     Other  "Topics Concern     Not on file     Social History Narrative        Fam Hx: reviewed  Family History   Problem Relation Age of Onset     DIABETES Father       during bilateral leg amputation      Myocardial Infarction Maternal Grandmother 40     KIDNEY DISEASE Paternal Grandmother      On dialysis     Cardiac Sudden Death Paternal Grandfather 60      during angiogram     DIABETES Brother 40     Breast Cancer No family hx of          Screening: reviewed    All: reviewed    Meds: reviewed  Current Outpatient Prescriptions   Medication Sig Dispense Refill     prochlorperazine (COMPAZINE) 10 MG tablet Take 1 tablet (10 mg) by mouth every 6 hours as needed for nausea or vomiting 20 tablet 1     LORazepam (ATIVAN) 0.5 MG tablet Take 1 tablet (0.5 mg) by mouth every 8 hours as needed for anxiety 20 tablet 0     metFORMIN (GLUCOPHAGE) 1000 MG tablet Take 1 tablet (1,000 mg) by mouth 2 times daily (with meals) 180 tablet 1     lisinopril (PRINIVIL/ZESTRIL) 20 MG tablet Take 0.5 tablets (10 mg) by mouth daily 90 tablet 3     atorvastatin (LIPITOR) 20 MG tablet Take 1 tablet (20 mg) by mouth daily 90 tablet 3     escitalopram (LEXAPRO) 20 MG tablet Take 1 tablet (20 mg) by mouth daily 90 tablet 3     [DISCONTINUED] METFORMIN HCL PO Take 1,000 mg by mouth 2 times daily (with meals)         OBJECTIVE:                                                    Physical Exam :  Blood pressure 110/68, pulse 78, temperature 98.4  F (36.9  C), temperature source Oral, height 5' 7\" (1.702 m), weight 160 lb 8 oz (72.8 kg), last menstrual period 2017, SpO2 96 %, not currently breastfeeding.     NAD, appears comfortable  Skin clear, no rashes  HEENT: PERRLA, EOMI, anicteric sclera, pink conjunctiva, external ears appear normal, bilateral tympanic membranes clinically normal, oropharynx normal color.  Neck: supple, no JVD, no thyroidmegaly  Lymph nodes non palpable in the cervical, supraclavicular axillaries, inguinal areas  Chest: " clear to auscultation with good respiratory effort  Cardiac: S1S2, RRR, no mgr appreciated  Abdomen: soft, not tender, not distended, audible bowel sound, no hepatosplenomegaly, no palpable masses, no abdominal bruits  Extremities: no cyanosis, clubbing or edema.   Neuro: A, Ox3, no focal signs.  Breast exam deferred, checked multiple times by onco, surgeon, MRI    Pelvic exam: Normal external genitals, normal appearing perineum, normal appearing urethra,  vaginal mucosa pink, no discharge, Cervix appears normal, Pap smear obtained. On bimanual exam, I did not feel any uterus or ovarian masses, and she denies any tenderness.         Archana Salazar MD  Internal Medicine        SUBJECTIVE:   CC: Cheryl Patel is an 56 year old woman who presents for preventive health visit.     *FYI-She will be starting a course of steroids soon (prescribed by oncology).    *Has had some spotting over the last few weeks (did not have a period for about 6 months until the day she started her chemo, lasted for 7 days at that time).   *Depression? When she feels good, she is ok. But when she is not feeling well, she feels very depressed, antisocial.     Healthy Habits:    Do you get at least three servings of calcium containing foods daily (dairy, green leafy vegetables, etc.)? Not currently, no appetite with the chemotherapy.     Amount of exercise or daily activities, outside of work: Minimal due to chemotherapy    Problems taking medications regularly No    Medication side effects: Yes a lot of nausea and fatigue from the chemo meds.     Have you had an eye exam in the past two years? no    Do you see a dentist twice per year? no    Do you have sleep apnea, excessive snoring or daytime drowsiness?Her  says she snores. She doesn't sleep well lately due to hot flashes (has worsened with chemo).             Today's PHQ-2 Score: No flowsheet data found.      Abuse: Current or Past(Physical, Sexual or Emotional)- No  Do you  "feel safe in your environment - Yes  Social History   Substance Use Topics     Smoking status: Former Smoker     Types: Cigarettes     Quit date: 8/3/1997     Smokeless tobacco: Never Used     Alcohol use Yes      Comment: socially     The patient does not drink >3 drinks per day nor >7 drinks per week.    Reviewed orders with patient.  Reviewed health maintenance and updated orders accordingly - Yes        Reviewed and updated as needed this visit by clinical staff  Tobacco  Allergies  Meds  Med Hx  Surg Hx  Fam Hx  Soc Hx        Reviewed and updated as needed this visit by Provider    COUNSELING:   Reviewed preventive health counseling, as reflected in patient instructions       Regular exercise       Healthy diet/nutrition         reports that she quit smoking about 20 years ago. Her smoking use included Cigarettes. She has never used smokeless tobacco.    Estimated body mass index is 25.55 kg/(m^2) as calculated from the following:    Height as of 8/3/17: 5' 7\" (1.702 m).    Weight as of 8/3/17: 163 lb 1.6 oz (74 kg).         Counseling Resources:  ATP IV Guidelines  Pooled Cohorts Equation Calculator  Breast Cancer Risk Calculator  FRAX Risk Assessment  ICSI Preventive Guidelines  Dietary Guidelines for Americans, 2010  ITYZ's MyPlate  ASA Prophylaxis  Lung CA Screening    Archana Chen MD  [facility]  "

## 2017-08-18 NOTE — MR AVS SNAPSHOT
After Visit Summary   8/18/2017    Cheryl Patel    MRN: [id number]           Patient Information     Date Of Birth          1960        Visit Information        Provider Department      8/18/2017 10:00 AM Archana Chen MD [facility]        Today's Diagnoses     Routine general medical examination at a health care facility    -  1    Malignant neoplasm of central portion of right breast in female, estrogen receptor positive (H)        Vaginal spotting        Recurrent major depressive disorder, in partial remission (H)          Care Instructions    Plan:  1. Pelvic ultrasound - To schedule this test you may call Scheduling center at [phone]    2. Continue Lexapro 20 mg daily  3. Add Wellbutrin 150 daily for a week and then twice a day   4. You may take Lorazepam at bed time to help with the sleep and hot flushes   5. Follow up after you start the steroids   6. Hold Metformin when you feel sick       Preventive Health Recommendations  Female Ages 50 - 64    Yearly exam: See your health care provider every year in order to  o Review health changes.   o Discuss preventive care.    o Review your medicines if your doctor has prescribed any.      Get a Pap test every three years (unless you have an abnormal result and your provider advises testing more often).    If you get Pap tests with HPV test, you only need to test every 5 years, unless you have an abnormal result.     You do not need a Pap test if your uterus was removed (hysterectomy) and you have not had cancer.    You should be tested each year for STDs (sexually transmitted diseases) if you're at risk.     Have a mammogram every 1 to 2 years.    Have a colonoscopy at age 50, or have a yearly FIT test (stool test). These exams screen for colon cancer.      Have a cholesterol test every 5 years, or more often if advised.    Have a diabetes test (fasting glucose) every three years. If you are at risk  "for diabetes, you should have this test more often.     If you are at risk for osteoporosis (brittle bone disease), think about having a bone density scan (DEXA).    Shots: Get a flu shot each year. Get a tetanus shot every 10 years.    Nutrition:     Eat at least 5 servings of fruits and vegetables each day.    Eat whole-grain bread, whole-wheat pasta and brown rice instead of white grains and rice.    Talk to your provider about Calcium and Vitamin D.     Lifestyle    Exercise at least 150 minutes a week (30 minutes a day, 5 days a week). This will help you control your weight and prevent disease.    Limit alcohol to one drink per day.    No smoking.     Wear sunscreen to prevent skin cancer.     See your dentist every six months for an exam and cleaning.    See your eye doctor every 1 to 2 years.          HEALTH INSURANCE AND YOUR OUT-OF-POCKET COSTS    How is the physical visit different from an office visit ?    A physical visit is a routine check-up or yearly physical exam. This is sometimes called \"preventive care\".  ( for example, you might have a clinic exam every year or a mammogram every other year). These visits are meant to prevent health problems. They do not include tests or treatments for specific medical issues.     An office visit is a clinic visit to check on a symptoms or to treat a specific concern. This concern may be new or ongoing. Your provider (care team) might order tests or prescribe treatments.     Can I have these services at the same time ?    Yes. If you come in for a physical exam, your provider will want to  talk about any symptoms you are having. This way, we may catch small problems before they become more serious - and you won't have to make another trip to the clinic.     If there is not enough time to talk about your symptoms, your provider will ask you to come back.    If you are treated for a medical issue during a physical exam, we must bill your plan for both services. This " "is a rule set by insurance companies.     If I receive both services, what are my out-of-pocket costs ?    Some plans will pay for both services. Others ( like Medicare) will not. You will need to pay for any service that your plan won't cover.     Even if your plan covers both services, you may still have out-of-pocket costs. Examples:    -- your plan may offer free physical exams. But you may owe a co-pay and other fees for services received as part of an office visit.   -- you plan may require two co-pays. If you have concerns about the second co-pay, please contact your insurance plan.    To find out what your total costs will be, you will need to call your insurance plan. Ask:    -- what does my plan cover ? Find out if your physical visit was covered. What if you also had testing or treatment for a medical concern ?    -- how much do I need to pay ? Ask about co-pays, co-insurance, your deductible and any other out-of-pocket costs.     -- are there limits on what my plan will pay for ? There may be limits on office visits, physical exams and routine tests ( such mammograms, PSA tests and colonoscopies).    About Your Out-Of-Pocket Costs    Out-of-Pocket costs are charges that are not covered by your insurance plan. You will need to pay for them yourself. They may include:    -- Services that your plan will not pay for. Please call your insurance plan to find out what it will cover.     -- A deductible. This is a fixed amount that you pay each year before insurance will pay for services. When you have paid the full amount, then you have \"met\" your deductible. After that, your plan will pay for part or all of your care.     -- Co-pays (co-payments). A co-pay is the amount you must pay at the time of service. It is a flat fee, decided by your insurance plan. Your fee may be different for wellness visits and office visits. Your plan will not cover this fee. The fee will not count toward your deductible.    -- " Co-insurance. You may need to pay a percent of the costs for all services you receive. This is called co-insurance. After your clinic visit, your plan will bill you for your share of the cost. This amount may count toward your deductible.     Copyright @ [facility]. All rights reserved. Walter [address]9727 - REV 11/12  -------------    Be aware that if you had a regular OBGYN appointment in the last 12 months that it might have been submitted to your insurance as the annual physical exam. Most of the insurances do not cover 2 annual exams in a year.                Follow-ups after your visit        Your next 10 appointments already scheduled     Sep 11, 2017  9:30 AM CDT   [employer] with [id number] ([facility])    [address] [facility] [facility] [address]  [facility] [id number] [phone]           1.  Please bring or wear a comfortable two-piece outfit. 2.  You may eat, drink and take your normal medicines. 3.  For any questions that cannot be answered, please contact the ordering physician ***Please check-in at the [place] [facility] Office located in [address] in the [facility] building. When you are finished registering, please go to [address] and have a seat. The technician will call your name for the test.              Future tests that were ordered for you today     Open Future Orders        Priority Expected Expires Ordered    US Pelvic Complete w Transvaginal Routine  8/18/2018 8/18/2017            Who to contact     If you have questions or need follow up information about today's clinic visit or your schedule please contact [facility] directly at [phone].  Normal or non-critical lab and imaging results will be communicated to you by MyChart, letter or phone within 4 business days after the clinic has received the results. If you do not hear from us within 7 days, please contact the clinic through  [email or web address] or phone. If you have a critical or abnormal lab result, we will notify you by phone as soon as possible.  Submit refill requests through [employer] or call your pharmacy and they will forward the refill request to us. Please allow 3 business days for your refill to be completed.          Additional Information About Your Visit        [email or web address] Information     [employer] lets you send messages to your doctor, view your test results, renew your prescriptions, schedule appointments and more. To sign up, go to [email or web address] . Click on \"Log in\" on the left side of the screen, which will take you to the Welcome page. Then click on \"Sign up Now\" on the right side of the page.     You will be asked to enter the access code listed below, as well as some personal information. Please follow the directions to create your username and password.     Your access code is: [email or web address]  Expires: 10/9/2017 10:58 AM     Your access code will  in 90 days. If you need help or a new code, please call your [facility] clinic or [phone].        Care EveryWhere ID     This is your Care EveryWhere ID. This could be used by other organizations to access your [facility] medical records  [id number]        Your Vitals Were     Pulse Temperature Height Last Period Pulse Oximetry Breastfeeding?    78 98.4  F (36.9  C) (Oral) 5' 7\" (1.702 m) 2017 96% No    BMI (Body Mass Index)                   25.14 kg/m2            Blood Pressure from Last 3 Encounters:   17 110/68   17 124/72   17 122/57    Weight from Last 3 Encounters:   17 160 lb 8 oz (72.8 kg)   17 163 lb 1.6 oz (74 kg)   17 165 lb 12.6 oz (75.2 kg)                 Today's Medication Changes          These changes are accurate as of: 17 10:24 AM.  If you have any questions, ask your nurse or doctor.               Start taking these medicines.        Dose/Directions    buPROPion 150 MG 12 hr tablet   Commonly known as:  " WELLBUTRIN SR   Used for:  Recurrent major depressive disorder, in partial remission (H)   Started by:  Archana Chen MD        Dose:  150 mg   Take 1 tablet (150 mg) by mouth 2 times daily   Quantity:  180 tablet   Refills:  1            Where to get your medicines      These medications were sent to [facility] [address] [place], [place] [id number] [address] [place] [address]  [address] [facility] [address]     Phone:  [phone]     buPROPion 150 MG 12 hr tablet                Primary Care Provider Office Phone # Fax #    Ina Pinzon [phone]       [facility]  [address]  [facility] [address]        Equal Access to Services     [facility] MALACHI : Hadii bhavya bianchi hadasho Sochapoali, waaxda luqadaha, qaybta kaalmada adeegyada, waxay idiin rebecca lara. So [facility] [phone].    ATENCIÓN: Si habla español, tiene a zaidi disposición servicios gratuitos de asistencia lingüística. [facility] [phone].    We comply with applicable federal civil rights laws and [place] laws. We do not discriminate on the basis of race, color, national origin, age, disability sex, sexual orientation or gender identity.            Thank you!     Thank you for choosing [facility]  for your care. Our goal is always to provide you with excellent care. Hearing back from our patients is one way we can continue to improve our services. Please take a few minutes to complete the written survey that you may receive in the mail after your visit with us. Thank you!             Your Updated Medication List - Protect others around you: Learn how to safely use, store and throw away your medicines at www.disposemymeds.org.          This list is accurate as of: 8/18/17 10:24 AM.  Always use your most recent med list.                   Brand Name Dispense Instructions for use Diagnosis    ATIVAN 0.5 MG tablet   Generic drug:  LORazepam     20 tablet    Take 1  tablet (0.5 mg) by mouth every 8 hours as needed for anxiety        atorvastatin 20 MG tablet    LIPITOR    90 tablet    Take 1 tablet (20 mg) by mouth daily    Hyperlipidemia LDL goal <100       buPROPion 150 MG 12 hr tablet    WELLBUTRIN SR    180 tablet    Take 1 tablet (150 mg) by mouth 2 times daily    Recurrent major depressive disorder, in partial remission (H)       escitalopram 20 MG tablet    LEXAPRO    90 tablet    Take 1 tablet (20 mg) by mouth daily    Mild recurrent major depression (H)       lisinopril 20 MG tablet    PRINIVIL/ZESTRIL    90 tablet    Take 0.5 tablets (10 mg) by mouth daily    Benign essential hypertension       metFORMIN 1000 MG tablet    GLUCOPHAGE    180 tablet    Take 1 tablet (1,000 mg) by mouth 2 times daily (with meals)    Type 2 diabetes mellitus without complication, without long-term current use of insulin (H)       prochlorperazine 10 MG tablet    COMPAZINE    20 tablet    Take 1 tablet (10 mg) by mouth every 6 hours as needed for nausea or vomiting

## 2017-08-18 NOTE — PATIENT INSTRUCTIONS
Plan:  1. Pelvic ultrasound - To schedule this test you may call Scheduling center at [phone]    2. Continue Lexapro 20 mg daily  3. Add Wellbutrin 150 daily for a week and then twice a day   4. You may take Lorazepam at bed time to help with the sleep and hot flushes   5. Follow up after you start the steroids   6. Hold Metformin when you feel sick       Preventive Health Recommendations  Female Ages 50 - 64    Yearly exam: See your health care provider every year in order to  o Review health changes.   o Discuss preventive care.    o Review your medicines if your doctor has prescribed any.      Get a Pap test every three years (unless you have an abnormal result and your provider advises testing more often).    If you get Pap tests with HPV test, you only need to test every 5 years, unless you have an abnormal result.     You do not need a Pap test if your uterus was removed (hysterectomy) and you have not had cancer.    You should be tested each year for STDs (sexually transmitted diseases) if you're at risk.     Have a mammogram every 1 to 2 years.    Have a colonoscopy at age 50, or have a yearly FIT test (stool test). These exams screen for colon cancer.      Have a cholesterol test every 5 years, or more often if advised.    Have a diabetes test (fasting glucose) every three years. If you are at risk for diabetes, you should have this test more often.     If you are at risk for osteoporosis (brittle bone disease), think about having a bone density scan (DEXA).    Shots: Get a flu shot each year. Get a tetanus shot every 10 years.    Nutrition:     Eat at least 5 servings of fruits and vegetables each day.    Eat whole-grain bread, whole-wheat pasta and brown rice instead of white grains and rice.    Talk to your provider about Calcium and Vitamin D.     Lifestyle    Exercise at least 150 minutes a week (30 minutes a day, 5 days a week). This will help you control your weight and prevent disease.    Limit  "alcohol to one drink per day.    No smoking.     Wear sunscreen to prevent skin cancer.     See your dentist every six months for an exam and cleaning.    See your eye doctor every 1 to 2 years.          HEALTH INSURANCE AND YOUR OUT-OF-POCKET COSTS    How is the physical visit different from an office visit ?    A physical visit is a routine check-up or yearly physical exam. This is sometimes called \"preventive care\".  ( for example, you might have a clinic exam every year or a mammogram every other year). These visits are meant to prevent health problems. They do not include tests or treatments for specific medical issues.     An office visit is a clinic visit to check on a symptoms or to treat a specific concern. This concern may be new or ongoing. Your provider (care team) might order tests or prescribe treatments.     Can I have these services at the same time ?    Yes. If you come in for a physical exam, your provider will want to  talk about any symptoms you are having. This way, we may catch small problems before they become more serious - and you won't have to make another trip to the clinic.     If there is not enough time to talk about your symptoms, your provider will ask you to come back.    If you are treated for a medical issue during a physical exam, we must bill your plan for both services. This is a rule set by insurance companies.     If I receive both services, what are my out-of-pocket costs ?    Some plans will pay for both services. Others ( like Medicare) will not. You will need to pay for any service that your plan won't cover.     Even if your plan covers both services, you may still have out-of-pocket costs. Examples:    -- your plan may offer free physical exams. But you may owe a co-pay and other fees for services received as part of an office visit.   -- you plan may require two co-pays. If you have concerns about the second co-pay, please contact your insurance plan.    To find out " "what your total costs will be, you will need to call your insurance plan. Ask:    -- what does my plan cover ? Find out if your physical visit was covered. What if you also had testing or treatment for a medical concern ?    -- how much do I need to pay ? Ask about co-pays, co-insurance, your deductible and any other out-of-pocket costs.     -- are there limits on what my plan will pay for ? There may be limits on office visits, physical exams and routine tests ( such mammograms, PSA tests and colonoscopies).    About Your Out-Of-Pocket Costs    Out-of-Pocket costs are charges that are not covered by your insurance plan. You will need to pay for them yourself. They may include:    -- Services that your plan will not pay for. Please call your insurance plan to find out what it will cover.     -- A deductible. This is a fixed amount that you pay each year before insurance will pay for services. When you have paid the full amount, then you have \"met\" your deductible. After that, your plan will pay for part or all of your care.     -- Co-pays (co-payments). A co-pay is the amount you must pay at the time of service. It is a flat fee, decided by your insurance plan. Your fee may be different for wellness visits and office visits. Your plan will not cover this fee. The fee will not count toward your deductible.    -- Co-insurance. You may need to pay a percent of the costs for all services you receive. This is called co-insurance. After your clinic visit, your plan will bill you for your share of the cost. This amount may count toward your deductible.     Copyright @ [employer]. All rights reserved. [facility] [address] - REV 11/12  -------------    Be aware that if you had a regular OBGYN appointment in the last 12 months that it might have been submitted to your insurance as the annual physical exam. Most of the insurances do not cover 2 annual exams in a year.        "

## 2017-08-18 NOTE — LETTER
August 26, 2017    Cheryl Patel  [address] [place] [address]      Dear ,      This letter is in regards to your recent cervical cancer screening (Pap smear and HPV test).    Your Pap smear result was reported as LSIL - low grade squamous intraepthelial lesion. This means that there were mildly abnormal cells found in the sample that we collected from your cervix, but no cancer cells were found. The vast majority of patients with this result do not have significant cervical abnormalities.     Your cervical sample was also tested for the presence of Human Papillomavirus (HPV). Your HPV test is NEGATIVE for high risk HPV, meaning that no HPV was found at this time.     Over time, your body can get rid of these abnormal cells, so it is recommended that you repeat your pap and HPV in 1 year.    If you have questions about these results contact [phone]    Please continue to be seen every year for an annual physical exam and other preventative tests.         Sincerely,    Archana Chen MD/nano

## 2017-08-19 ASSESSMENT — ANXIETY QUESTIONNAIRES: GAD7 TOTAL SCORE: 7

## 2017-08-22 LAB
COPATH REPORT: ABNORMAL
PAP: ABNORMAL

## 2017-08-24 LAB
FINAL DIAGNOSIS: NORMAL
HPV HR 12 DNA CVX QL NAA+PROBE: NEGATIVE
HPV16 DNA SPEC QL NAA+PROBE: NEGATIVE
HPV18 DNA SPEC QL NAA+PROBE: NEGATIVE
SPECIMEN DESCRIPTION: NORMAL

## 2017-08-24 NOTE — PROGRESS NOTES
8/18/17 LSIL/Neg HPV. Plan: cotest in 1 year  8/5/18 Cotest reminder letter sent (rlm)  12/31/18 Pap Follow up reminder call placed (rlm)  1/20/19 Patient is lost to follow-up. Routed to provider as FYSHANTANU. (rlm)

## 2017-08-25 PROBLEM — E11.9 TYPE 2 DIABETES MELLITUS WITHOUT COMPLICATION, WITHOUT LONG-TERM CURRENT USE OF INSULIN (H): Status: ACTIVE | Noted: 2017-08-25

## 2017-08-29 ENCOUNTER — TRANSFERRED RECORDS (OUTPATIENT)
Dept: HEALTH INFORMATION MANAGEMENT | Facility: CLINIC | Age: 57
End: 2017-08-29

## 2017-08-30 ENCOUNTER — RADIANT APPOINTMENT (OUTPATIENT)
Dept: ULTRASOUND IMAGING | Facility: CLINIC | Age: 57
End: 2017-08-30
Attending: INTERNAL MEDICINE
Payer: OTHER GOVERNMENT

## 2017-08-30 DIAGNOSIS — N93.9 VAGINAL SPOTTING: ICD-10-CM

## 2017-08-30 PROCEDURE — 76856 US EXAM PELVIC COMPLETE: CPT | Performed by: FAMILY MEDICINE

## 2017-08-30 PROCEDURE — 76830 TRANSVAGINAL US NON-OB: CPT | Performed by: FAMILY MEDICINE

## 2017-08-31 ENCOUNTER — TELEPHONE (OUTPATIENT)
Dept: INTERNAL MEDICINE | Facility: CLINIC | Age: 57
End: 2017-08-31

## 2017-08-31 NOTE — TELEPHONE ENCOUNTER
Please call the patient  The pelvic ultrasound shows possible fibromas that do not affect the uterine cavity.  Because of her intermittent vaginal bleeding, I would like her to see the OB/GYN.    Please mail the ultrasound results to the patient, please fax the results to CT [facility]

## 2017-09-11 ENCOUNTER — TELEPHONE (OUTPATIENT)
Dept: CARDIOLOGY | Facility: CLINIC | Age: 57
End: 2017-09-11

## 2017-09-11 ENCOUNTER — HOSPITAL ENCOUNTER (OUTPATIENT)
Dept: CARDIOLOGY | Facility: CLINIC | Age: 57
Discharge: HOME | End: 2017-09-11
Attending: INTERNAL MEDICINE | Admitting: INTERNAL MEDICINE
Payer: OTHER GOVERNMENT

## 2017-09-11 DIAGNOSIS — Z92.21 STATUS POST CHEMOTHERAPY: ICD-10-CM

## 2017-09-11 PROCEDURE — 40000264 ECHO LIMITED WITH OPTISON

## 2017-09-11 PROCEDURE — 0399T ZZHC MYOCARDIAL STRAIN IMAGING: CPT | Performed by: INTERNAL MEDICINE

## 2017-09-11 PROCEDURE — 25500064 ZZH RX 255 OP 636: Performed by: INTERNAL MEDICINE

## 2017-09-11 PROCEDURE — 93308 TTE F-UP OR LMTD: CPT | Mod: 26 | Performed by: INTERNAL MEDICINE

## 2017-09-11 PROCEDURE — 93325 DOPPLER ECHO COLOR FLOW MAPG: CPT | Mod: 26 | Performed by: INTERNAL MEDICINE

## 2017-09-11 PROCEDURE — 93308 TTE F-UP OR LMTD: CPT

## 2017-09-11 PROCEDURE — 93321 DOPPLER ECHO F-UP/LMTD STD: CPT | Mod: 26 | Performed by: INTERNAL MEDICINE

## 2017-09-11 RX ADMIN — HUMAN ALBUMIN MICROSPHERES AND PERFLUTREN 3 ML: 10; .22 INJECTION, SOLUTION INTRAVENOUS at 10:15

## 2017-09-11 NOTE — TELEPHONE ENCOUNTER
Notes Recorded by Kareem Swneson MD on 9/11/2017 at 1:54 PM  Heart still looks great.  Should be okay through the Adriamycin.  No need for further echocardiogram at this time.    Left a detailed VM with the results and call back number for any questions or concerns.

## 2017-09-18 ENCOUNTER — TRANSFERRED RECORDS (OUTPATIENT)
Dept: HEALTH INFORMATION MANAGEMENT | Facility: CLINIC | Age: 57
End: 2017-09-18

## 2017-09-19 ENCOUNTER — OFFICE VISIT (OUTPATIENT)
Dept: OBGYN | Facility: CLINIC | Age: 57
End: 2017-09-19
Payer: OTHER GOVERNMENT

## 2017-09-19 VITALS
HEIGHT: 67 IN | BODY MASS INDEX: 25.54 KG/M2 | DIASTOLIC BLOOD PRESSURE: 78 MMHG | SYSTOLIC BLOOD PRESSURE: 126 MMHG | WEIGHT: 162.7 LBS

## 2017-09-19 DIAGNOSIS — N92.6 IRREGULAR MENSTRUAL CYCLE: Primary | ICD-10-CM

## 2017-09-19 PROCEDURE — 99242 OFF/OP CONSLTJ NEW/EST SF 20: CPT | Performed by: OBSTETRICS & GYNECOLOGY

## 2017-09-19 NOTE — PATIENT INSTRUCTIONS
You may be interested in this book, which is an evidence-based guide to eating for good health: How Not To Die by Dr. Salcedo; his website is: Nutritionfacts.org

## 2017-09-19 NOTE — NURSING NOTE
"Chief Complaint   Patient presents with     Results     Ultrasound results from 8/30/17       Initial /78  Ht 5' 7\" (1.702 m)  Wt 162 lb 11.2 oz (73.8 kg)  BMI 25.48 kg/m2 Estimated body mass index is 25.48 kg/(m^2) as calculated from the following:    Height as of this encounter: 5' 7\" (1.702 m).    Weight as of this encounter: 162 lb 11.2 oz (73.8 kg).  Medication Reconciliation: complete   Cecily Yepez CMA      "

## 2017-09-19 NOTE — MR AVS SNAPSHOT
"              After Visit Summary   2017    Cheryl Patel    MRN: [id number]           Patient Information     Date Of Birth          1960        Visit Information        Provider Department      2017 10:30 AM Lashawn Hernandez MD [facility]        Care Instructions    How Not To Die by Dr. Salcedo; [email or web address]          Follow-ups after your visit        Who to contact     If you have questions or need follow up information about today's clinic visit or your schedule please contact [facility] directly at [phone].  Normal or non-critical lab and imaging results will be communicated to you by [employer], letter or phone within 4 business days after the clinic has received the results. If you do not hear from us within 7 days, please contact the clinic through [email or web address] or phone. If you have a critical or abnormal lab result, we will notify you by phone as soon as possible.  Submit refill requests through [email or web address] or call your pharmacy and they will forward the refill request to us. Please allow 3 business days for your refill to be completed.          Additional Information About Your Visit        MyChart Information     [email or web address] lets you send messages to your doctor, view your test results, renew your prescriptions, schedule appointments and more. To sign up, go to [email or web address] . Click on \"Log in\" on the left side of the screen, which will take you to the Welcome page. Then click on \"Sign up Now\" on the right side of the page.     You will be asked to enter the access code listed below, as well as some personal information. Please follow the directions to create your username and password.     Your access code is: [email or web address]  Expires: 10/9/2017 10:58 AM     Your access code will  in 90 days. If you need help or a new code, please call your [facility] or [phone].        Care EveryWhere ID     This is your Care EveryWhere ID. " "This could be used by other organizations to access your [facility] medical records  [id number]        Your Vitals Were     Height BMI (Body Mass Index)                5' 7\" (1.702 m) 25.48 kg/m2           Blood Pressure from Last 3 Encounters:   09/19/17 126/78   08/18/17 110/68   08/03/17 124/72    Weight from Last 3 Encounters:   09/19/17 162 lb 11.2 oz (73.8 kg)   08/18/17 160 lb 8 oz (72.8 kg)   08/03/17 163 lb 1.6 oz (74 kg)              Today, you had the following     No orders found for display       Primary Care Provider Office Phone # Fax #    Ina Pinzon [phone]       [facility]  [address] [place] [address]        Equal Access to Services     [facility]: Hadii aad ku hadasho Soomaali, waaxda luqadaha, qaybta kaalmada adeegyada, migdalia carvajalin hayaaligia gaffney . So [facility] [phone].    ATENCIÓN: Si habla español, tiene a zaidi disposición servicios gratuitos de asistencia lingüística. Lauren al [phone].    We comply with applicable federal civil rights laws and [place] laws. We do not discriminate on the basis of race, color, national origin, age, disability sex, sexual orientation or gender identity.            Thank you!     Thank you for choosing [facility]  for your care. Our goal is always to provide you with excellent care. Hearing back from our patients is one way we can continue to improve our services. Please take a few minutes to complete the written survey that you may receive in the mail after your visit with us. Thank you!             Your Updated Medication List - Protect others around you: Learn how to safely use, store and throw away your medicines at www.disposemymeds.org.          This list is accurate as of: 9/19/17 11:39 AM.  Always use your most recent med list.                   Brand Name Dispense Instructions for use Diagnosis    ATIVAN 0.5 MG tablet   Generic drug:  LORazepam     20 tablet "    Take 1 tablet (0.5 mg) by mouth every 8 hours as needed for anxiety        atorvastatin 20 MG tablet    LIPITOR    90 tablet    Take 1 tablet (20 mg) by mouth daily    Hyperlipidemia LDL goal <100       buPROPion 150 MG 12 hr tablet    WELLBUTRIN SR    180 tablet    Take 1 tablet (150 mg) by mouth 2 times daily    Recurrent major depressive disorder, in partial remission (H)       escitalopram 20 MG tablet    LEXAPRO    90 tablet    Take 1 tablet (20 mg) by mouth daily    Mild recurrent major depression (H)       lisinopril 20 MG tablet    PRINIVIL/ZESTRIL    90 tablet    Take 0.5 tablets (10 mg) by mouth daily    Benign essential hypertension       metFORMIN 1000 MG tablet    GLUCOPHAGE    180 tablet    Take 1 tablet (1,000 mg) by mouth 2 times daily (with meals)    Type 2 diabetes mellitus without complication, without long-term current use of insulin (H)       prochlorperazine 10 MG tablet    COMPAZINE    20 tablet    Take 1 tablet (10 mg) by mouth every 6 hours as needed for nausea or vomiting

## 2017-09-19 NOTE — PROGRESS NOTES
HPI: Cheryl Patel is a 56 year old female   Obstetric History       T1      L1     SAB0   TAB0   Ectopic0   Multiple0   Live Births1     No LMP recorded. Patient is not currently having periods (Reason: Irregular Periods). who presents by consult from Dr. Salazar for evaluation of irregular bleeding, ultrasound results.    Patient states she used an OCP for most of her adult life.  When she discontinued it, she had 5-6 weeks of bleeding.  She had a D&C and required a transfusion.  Patient then restarted an OCP to control her bleeding and cramping.  She was started on Prempro recently as she continued to have periods at age 56.  Patient states that she did not have a period for 6 months until the day she started chemo; reports a 7 day period at that time, moderate flow.  She notes that she started spotting 1 week after her period stopped.  Patient notes that her sister may have had fibroids.  She states in the past she had lots of clotting with periods.   Patient was recently diagnosed with breast cancer and is status post mastectomy(unilateral).    She is currently receiving chemo, states she just finished 4 weeks of treatment and will be on Taxol for the next 12 weeks.     She notes that she was able to move back to the [facility] to have options for good care.  Patient notes that she inquired about having an oophorectomy but was told that this wouldn't be of reat benefit for the risks.  She notes that she is questioning if she should have had a double mastectomy.  Patient has support from family and friends in [place].     PGYNH: menarche 15-17 y.o.; denies history of abnormal paps, denies history of STDs.     Past Medical History:   Diagnosis Date     Cancer (H)      Diabetes (H)      Papanicolaou smear of cervix with low grade squamous intraepithelial lesion (LGSIL) 2017 LSIL/Neg HPV. Plan: cotest in 1 year     Past Surgical History:   Procedure Laterality Date     BIOPSY        BREAST SURGERY       MASTECTOMY       Family History   Problem Relation Age of Onset     Other - See Comments Mother      hysterectomy for benign     DIABETES Father       during bilateral leg amputation      Myocardial Infarction Maternal Grandmother 40     KIDNEY DISEASE Paternal Grandmother      On dialysis     Cardiac Sudden Death Paternal Grandfather 60      during angiogram     DIABETES Brother 40     Other - See Comments Sister      hysterectomy for benign     Breast Cancer No family hx of      Social History     Social History     Marital status:      Spouse name: N/A     Number of children: N/A     Years of education: N/A     Occupational History     Not on file.     Social History Main Topics     Smoking status: Former Smoker     Types: Cigarettes     Quit date: 8/3/1997     Smokeless tobacco: Never Used     Alcohol use Yes      Comment: socially     Drug use: No     Sexual activity: Yes     Partners: Male     Other Topics Concern     Not on file     Social History Narrative       Review of Systems:   CONSTITUTIONAL: NEGATIVE for fever, chills, change in weight  INTEGUMENTARY/SKIN: NEGATIVE for worrisome rashes, moles or lesions  RESP: NEGATIVE for significant cough or SOB  BREAST: NEGATIVE for masses, tenderness or discharge  CV: NEGATIVE for chest pain, palpitations or peripheral edema  GI: NEGATIVE for abdominal pain, heartburn, or change in bowel habits, nausea, emesis  : negative for dysuria, vaginal discharge and bleeding  MUSCULOSKELETAL: NEGATIVE for significant arthralgias or myalgia  NEURO: NEGATIVE for weakness, dizziness or paresthesias  PSYCHIATRIC: NEGATIVE for changes in mood or affect     This document serves as a record of the services and decisions personally performed and made by Lashawn Hernandez MD. It was created on her behalf by Moon Sesay, a trained medical scribe. The creation of this document is based the provider's statements to the medical scribe.  Moon  "Shama September 19, 2017 11:11 AM      /78  Ht 1.702 m (5' 7\")  Wt 73.8 kg (162 lb 11.2 oz)  BMI 25.48 kg/m2    Physical exam:  GENERAL APPEARANCE: healthy, alert and no distress        Assessment/Plan:  (N92.6) Irregular menstrual cycle  (primary encounter diagnosis)  Comment:    Irregular bleeding following 6 mth episode of amenorrhea; suspect late perimenopause.    Due to her diagnosis of breast CA, patient has discontinued hormonal replacement therapy.      Ultrasound results and imaged reviewed: thin endometrial stripe, 1.7 mm; 1 cm fibroid, normal adnexa (left ovary not seen).     Although patient is not clinically postmenopausal, ultrasound results/endometrial stripe seems reassuring.    Discussed options for conservativement management, vs endometrial biopsy today.    I think it is reasonable to follow for any recurrent episodes of bleeding, or if bleeding becomes heavy.    Patient agrees with this management plan.    All questions answered.  Patient requests that the ultrasound follow-up is passed along to Dr. Pinzon.       Approximately 30 minutes were spent with the patient, of which > 50% was spent in face to face counselling.    The information in this document, created by the medical scribe for me, accurately reflects the services I personally performed and the decisions made by me. I have reviewed and approved this document for accuracy prior to leaving the patient care area.  9/19/2017 11:11 AM     Lashawn Hernandez M.D."

## 2017-09-25 ENCOUNTER — TRANSFERRED RECORDS (OUTPATIENT)
Dept: HEALTH INFORMATION MANAGEMENT | Facility: CLINIC | Age: 57
End: 2017-09-25

## 2017-09-25 LAB
ALT SERPL-CCNC: 28 IU/L (ref 7–52)
AST SERPL-CCNC: 13 U/L (ref 13–39)
CREAT SERPL-MCNC: 0.75 MG/DL (ref 0.6–1.3)
GFR SERPL CREATININE-BSD FRML MDRD: 89.2 ML/MIN/1.73M2
GLUCOSE SERPL-MCNC: 354 MG/DL (ref 70–110)
POTASSIUM SERPL-SCNC: 4 MMOL/L (ref 3.5–5.1)

## 2017-10-09 ENCOUNTER — TRANSFERRED RECORDS (OUTPATIENT)
Dept: HEALTH INFORMATION MANAGEMENT | Facility: CLINIC | Age: 57
End: 2017-10-09

## 2017-10-23 ENCOUNTER — TRANSFERRED RECORDS (OUTPATIENT)
Dept: HEALTH INFORMATION MANAGEMENT | Facility: CLINIC | Age: 57
End: 2017-10-23

## 2017-11-02 ENCOUNTER — TRANSFERRED RECORDS (OUTPATIENT)
Dept: HEALTH INFORMATION MANAGEMENT | Facility: CLINIC | Age: 57
End: 2017-11-02

## 2017-11-06 ENCOUNTER — TRANSFERRED RECORDS (OUTPATIENT)
Dept: HEALTH INFORMATION MANAGEMENT | Facility: CLINIC | Age: 57
End: 2017-11-06

## 2017-11-08 ENCOUNTER — RADIANT APPOINTMENT (OUTPATIENT)
Dept: BONE DENSITY | Facility: CLINIC | Age: 57
End: 2017-11-08
Payer: OTHER GOVERNMENT

## 2017-11-08 DIAGNOSIS — Z78.0 ASYMPTOMATIC POSTMENOPAUSAL STATUS: ICD-10-CM

## 2017-11-08 PROCEDURE — 77080 DXA BONE DENSITY AXIAL: CPT | Performed by: INTERNAL MEDICINE

## 2017-11-16 ENCOUNTER — TELEPHONE (OUTPATIENT)
Dept: INTERNAL MEDICINE | Facility: CLINIC | Age: 57
End: 2017-11-16

## 2017-11-16 DIAGNOSIS — E11.9 TYPE 2 DIABETES MELLITUS WITHOUT COMPLICATION, WITHOUT LONG-TERM CURRENT USE OF INSULIN (H): Primary | ICD-10-CM

## 2017-11-16 NOTE — TELEPHONE ENCOUNTER
Panel Management Review      Patient has the following on her problem list:     Diabetes    ASA: Failed    Last A1C  Lab Results   Component Value Date    A1C 6.4 07/26/2017     A1C tested: Passed    Last LDL:    Lab Results   Component Value Date    CHOL 166 08/11/2017     Lab Results   Component Value Date    HDL 51 08/11/2017     Lab Results   Component Value Date    LDL 87 08/11/2017     Lab Results   Component Value Date    TRIG 138 08/11/2017     No results found for: CHOLHDLRATIO  Lab Results   Component Value Date    NHDL 115 08/11/2017       Is the patient on a Statin? YES             Is the patient on Aspirin? NO    Medications     HMG CoA Reductase Inhibitors    atorvastatin (LIPITOR) 20 MG tablet          Last three blood pressure readings:  BP Readings from Last 3 Encounters:   09/19/17 126/78   08/18/17 110/68   08/03/17 124/72       Date of last diabetes office visit: 8/18/17     Tobacco History:     History   Smoking Status     Former Smoker     Types: Cigarettes     Quit date: 8/3/1997   Smokeless Tobacco     Never Used             Composite cancer screening  Chart review shows that this patient is due/due soon for the following Colonoscopy  Summary:    Patient is due/failing the following:   COLONOSCOPY, f/u appointment?     Action needed:   Routed to provider for review.She is currently undergoing treatment for cancer, not recommended that she have colonoscopy at this time.     Type of outreach:    None, routed to provider for review.    Questions for provider review:    *Dr. Salazar, you had recommended at her LOV 8/18/17 that she follow up with you after she started the steroids that were going to be started by her oncologist Dr. Pinzon. I don't see anything in the notes about WHEN she would be starting the steroids. She was also started on Wellbutrin at that visit. Do you have any recommendations regarding when you would like to see her back? (She was originally on our list for colonoscopy,  but we will not contact her for this because she was so sick from the chemotherapy the last time she was here).                                                                                                                                     Lea Gilman [facility]       Chart routed to Provider .

## 2017-11-16 NOTE — LETTER
[facility]  [address], [address]  [place], [place]  [address]                                            TEL:[phone]  FAX:[phone]      Cheryl TAYLOR Patel  [address] [place] [address]      December 1, 2017    Dear Cheryl      We just wanted to reach out and let you know we have been thinking of you, and we really hope things have been going well for you.   After you have finished with your chemo and things settle down a bit for you, we'd like it if you could schedule an appointment--Dr. Salazar would really like to see how you are doing.   Best wishes to you and your family--hope you have a great holiday season!    Sincerely,      The Office of Archana Chen M.D.

## 2017-11-18 NOTE — TELEPHONE ENCOUNTER
Aspirin not indicated at this time.  Orders done    I would like to see her after she is done with chemotherapy.  you may call the patient and ask how she feels

## 2017-11-20 ENCOUNTER — TRANSFERRED RECORDS (OUTPATIENT)
Dept: HEALTH INFORMATION MANAGEMENT | Facility: CLINIC | Age: 57
End: 2017-11-20

## 2017-12-01 NOTE — TELEPHONE ENCOUNTER
Call back from pt. States she finishes chemo 12/11 and will start radiation in Jan. States she is feeling well. Transferred to scheduling.

## 2017-12-04 ENCOUNTER — TRANSFERRED RECORDS (OUTPATIENT)
Dept: HEALTH INFORMATION MANAGEMENT | Facility: CLINIC | Age: 57
End: 2017-12-04

## 2017-12-26 ENCOUNTER — TRANSFERRED RECORDS (OUTPATIENT)
Dept: HEALTH INFORMATION MANAGEMENT | Facility: CLINIC | Age: 57
End: 2017-12-26

## 2018-01-08 ENCOUNTER — OFFICE VISIT (OUTPATIENT)
Dept: INTERNAL MEDICINE | Facility: CLINIC | Age: 58
End: 2018-01-08
Payer: OTHER GOVERNMENT

## 2018-01-08 VITALS
TEMPERATURE: 97.6 F | BODY MASS INDEX: 26.08 KG/M2 | SYSTOLIC BLOOD PRESSURE: 122 MMHG | WEIGHT: 166.5 LBS | OXYGEN SATURATION: 100 % | DIASTOLIC BLOOD PRESSURE: 84 MMHG | HEART RATE: 65 BPM

## 2018-01-08 DIAGNOSIS — L98.9 SKIN LESION: ICD-10-CM

## 2018-01-08 DIAGNOSIS — C50.111 MALIGNANT NEOPLASM OF CENTRAL PORTION OF RIGHT BREAST IN FEMALE, ESTROGEN RECEPTOR POSITIVE (H): ICD-10-CM

## 2018-01-08 DIAGNOSIS — E11.9 TYPE 2 DIABETES MELLITUS WITHOUT COMPLICATION, WITHOUT LONG-TERM CURRENT USE OF INSULIN (H): Primary | ICD-10-CM

## 2018-01-08 DIAGNOSIS — E78.5 HYPERLIPIDEMIA LDL GOAL <100: ICD-10-CM

## 2018-01-08 DIAGNOSIS — F33.41 RECURRENT MAJOR DEPRESSIVE DISORDER, IN PARTIAL REMISSION (H): ICD-10-CM

## 2018-01-08 DIAGNOSIS — Z23 NEED FOR TDAP VACCINATION: ICD-10-CM

## 2018-01-08 DIAGNOSIS — Z17.0 MALIGNANT NEOPLASM OF CENTRAL PORTION OF RIGHT BREAST IN FEMALE, ESTROGEN RECEPTOR POSITIVE (H): ICD-10-CM

## 2018-01-08 LAB — HBA1C MFR BLD: 7.5 % (ref 4.3–6)

## 2018-01-08 PROCEDURE — 83036 HEMOGLOBIN GLYCOSYLATED A1C: CPT | Performed by: INTERNAL MEDICINE

## 2018-01-08 PROCEDURE — 90715 TDAP VACCINE 7 YRS/> IM: CPT | Performed by: INTERNAL MEDICINE

## 2018-01-08 PROCEDURE — 36415 COLL VENOUS BLD VENIPUNCTURE: CPT | Performed by: INTERNAL MEDICINE

## 2018-01-08 PROCEDURE — 82043 UR ALBUMIN QUANTITATIVE: CPT | Performed by: INTERNAL MEDICINE

## 2018-01-08 PROCEDURE — 99214 OFFICE O/P EST MOD 30 MIN: CPT | Performed by: INTERNAL MEDICINE

## 2018-01-08 ASSESSMENT — PATIENT HEALTH QUESTIONNAIRE - PHQ9: SUM OF ALL RESPONSES TO PHQ QUESTIONS 1-9: 1

## 2018-01-08 NOTE — PATIENT INSTRUCTIONS
Plan:  1. Dermatology referral   2. Cerave cream - 1-2 times a day   3. Labs today   4. Continue same meds, same doses for now   5. Call back with effexor dose and I will give refills for 3 months   6. Make an appointment with the eye doctor   7. Tetanus shot today

## 2018-01-08 NOTE — MR AVS SNAPSHOT
After Visit Summary   1/8/2018    Cheryl Patel    MRN: [id number]           Patient Information     Date Of Birth          1960        Visit Information        Provider Department      1/8/2018 1:00 PM Archana Chen MD [facility]        Today's Diagnoses     Skin lesion    -  1    Type 2 diabetes mellitus without complication, without long-term current use of insulin (H)          Care Instructions    Plan:  1. Dermatology referral   2. Cerave cream - 1-2 times a day   3. Labs today   4. Continue same meds, same doses for now   5. Call back with effexor dose and I will give refills for 3 months   6. Make an appointment with the eye doctor   7. Tetanus shot today           Follow-ups after your visit        Additional Services     DERMATOLOGY REFERRAL       Your provider has referred you to:     [facility]:Dermatology [place] [facility] [place] [phone]   https://www.[place].[facility]. - Dr Valdemar Suresh [phone]   [email or web address]://[email or web address]-[email or web address]/    [facility] Dermatology [facility] [phone]   http://www.[facility]/    [facility] [place] [phone]  Http://www.[facility]are[facility].com/locations/[place].html  [facility] Dermat[facility] [phone]   http://[email or web address]/  [place] [phone]   http://[email or web address].[email or web address].[email or web address]/  [facility]: [facility] [place] [phone]   http://www.[facility].com/  [facility]: [facility] [place] [phone]  http://www.[facility].com  [facility]: [facility] - [place] [phone]   http://[email or web address]  [facility]: [facility] - [place] [phone]   http://[email or web address]  [place] [phone]   http://[email or web address]/  [facility] [place] - [place] [phone]   http://www.[email or web address]/  [facility] - [place] [phone]   http://www.[facility].com/  [facility] Dermatology [facility]. [place] [phone]   http://www.[employer]/services/dermatology/  [facility] - [place] [phone]   [email or web address]  [facility] [place] [phone]   http://www.[facility].com/    Please be aware that coverage of these services is subject to the terms and limitations of your health insurance plan.  Call member services at your health plan with any benefit or coverage questions.      Please bring the following with you to your appointment:    (1) Any X-Rays, CTs or MRIs which have been performed.  Contact the facility where they were done to arrange for  prior to your scheduled appointment.    (2) List of current medications  (3) This referral request   (4) Any documents/labs given to you for this referral            OPHTHALMOLOGY ADULT REFERRAL       Your provider has referred you to: [facility]: [place] [facility]  [phone]  http://:www.[facility].[employer]    Please be aware that coverage of these services is subject to the terms and limitations of your health insurance plan.  Call member services at your health plan with any benefit or coverage questions.      Please bring the following with you to your appointment:    (1) Any X-Rays, CTs or MRIs which have been performed.  Contact the facility where they were done to arrange for  prior to your scheduled appointment.    (2) List of current medications  (3) This referral request   (4) Any documents/labs given to you for this referral                  Who to contact     If you have questions or need follow up information about today's clinic visit or your schedule please contact [facility] directly at [phone].  Normal or non-critical lab and imaging results will be  "communicated to you by [employer], letter or phone within 4 business days after the clinic has received the results. If you do not hear from us within 7 days, please contact the clinic through [facility] or phone. If you have a critical or abnormal lab result, we will notify you by phone as soon as possible.  Submit refill requests through [facility] or call your pharmacy and they will forward the refill request to us. Please allow 3 business days for your refill to be completed.          Additional Information About Your Visit        [facility] Information     [facility] lets you send messages to your doctor, view your test results, renew your prescriptions, schedule appointments and more. To sign up, go to www.[email or web address].[facility] . Click on \"Log in\" on the left side of the screen, which will take you to the Welcome page. Then click on \"Sign up Now\" on the right side of the page.     You will be asked to enter the access code listed below, as well as some personal information. Please follow the directions to create your username and password.     Your access code is: [email or web address]  Expires: 2018  1:36 PM     Your access code will  in 90 days. If you need help or a new code, please call your [place] clinic or [phone].        Care EveryWhere ID     This is your Care EveryWhere ID. This could be used by other organizations to access your [place] medical records  [id number]        Your Vitals Were     Pulse Temperature Pulse Oximetry BMI (Body Mass Index)          65 97.6  F (36.4  C) (Oral) 100% 26.08 kg/m2         Blood Pressure from Last 3 Encounters:   18 122/84   17 126/78   17 110/68    Weight from Last 3 Encounters:   18 166 lb 8 oz (75.5 kg)   17 162 lb 11.2 oz (73.8 kg)   17 160 lb 8 oz (72.8 kg)              We Performed the Following     Albumin Random Urine Quantitative with Creat Ratio     DERMATOLOGY REFERRAL     Hemoglobin A1c     OPHTHALMOLOGY ADULT REFERRAL        "   Today's Medication Changes          These changes are accurate as of: 1/8/18  1:36 PM.  If you have any questions, ask your nurse or doctor.               Stop taking these medicines if you haven't already. Please contact your care team if you have questions.     ATIVAN 0.5 MG tablet   Generic drug:  LORazepam   Stopped by:  Archana Chen MD           buPROPion 150 MG 12 hr tablet   Commonly known as:  WELLBUTRIN SR   Stopped by:  Archana Chen MD           escitalopram 20 MG tablet   Commonly known as:  LEXAPRO   Stopped by:  Archana Chen MD           prochlorperazine 10 MG tablet   Commonly known as:  COMPAZINE   Stopped by:  Archana Chen MD                Where to get your medicines      These medications were sent to [facility] [address] [place] [address]     Phone:  [phone]     metFORMIN 1000 MG tablet                Primary Care Provider Office Phone # Fax #    Archana Chen [phone]       [address] [place] [address]        Equal Access to Services     JOSE EDUARDO LY : Hadii aad ku hadasho Soomaali, waaxda luqadaha, qaybta kaalmada adeegyada, waxay idiin haycathleenn bandar gaffney . So [facility] [phone].    ATENCIÓN: Si habla español, tiene a zaidi disposición servicios gratuitos de asistencia lingüística. Llame al [phone].    We comply with applicable federal civil rights laws and [place] laws. We do not discriminate on the basis of race, color, national origin, age, disability, sex, sexual orientation, or gender identity.            Thank you!     Thank you for choosing [facility]  for your care. Our goal is always to provide you with excellent care. Hearing back from our patients is one way we can continue to improve our services. Please take a few minutes to complete the written survey  that you may receive in the mail after your visit with us. Thank you!             Your Updated Medication List - Protect others around you: Learn how to safely use, store and throw away your medicines at www.disposemymeds.org.          This list is accurate as of: 1/8/18  1:36 PM.  Always use your most recent med list.                   Brand Name Dispense Instructions for use Diagnosis    ASPIRIN NOT PRESCRIBED    INTENTIONAL    1 each    Please choose reason not prescribed, below    Type 2 diabetes mellitus without complication, without long-term current use of insulin (H)       atorvastatin 20 MG tablet    LIPITOR    90 tablet    Take 1 tablet (20 mg) by mouth daily    Hyperlipidemia LDL goal <100       EFFEXOR PO      Take 50 mg by mouth daily        lisinopril 20 MG tablet    PRINIVIL/ZESTRIL    90 tablet    Take 0.5 tablets (10 mg) by mouth daily    Benign essential hypertension       metFORMIN 1000 MG tablet    GLUCOPHAGE    180 tablet    Take 1 tablet (1,000 mg) by mouth 2 times daily (with meals)    Type 2 diabetes mellitus without complication, without long-term current use of insulin (H)

## 2018-01-08 NOTE — NURSING NOTE
"Chief Complaint   Patient presents with     RECHECK       Initial /84  Pulse 65  Temp 97.6  F (36.4  C) (Oral)  Wt 166 lb 8 oz (75.5 kg)  SpO2 100%  BMI 26.08 kg/m2 Estimated body mass index is 26.08 kg/(m^2) as calculated from the following:    Height as of 9/19/17: 5' 7\" (1.702 m).    Weight as of this encounter: 166 lb 8 oz (75.5 kg).  Medication Reconciliation: complete     Alicia Knutson CMA      "

## 2018-01-08 NOTE — NURSING NOTE
Screening Questionnaire for Adult Immunization    Are you sick today?   No   Do you have allergies to medications, food, a vaccine component or latex?   No   Have you ever had a serious reaction after receiving a vaccination?   No   Do you have a long-term health problem with heart disease, lung disease, asthma, kidney disease, metabolic disease (e.g. diabetes), anemia, or other blood disorder?   Yes   Do you have cancer, leukemia, HIV/AIDS, or any other immune system problem?   Yes   In the past 3 months, have you taken medications that affect  your immune system, such as prednisone, other steroids, or anticancer drugs; drugs for the treatment of rheumatoid arthritis, Crohn s disease, or psoriasis; or have you had radiation treatments?   Yes   Have you had a seizure, or a brain or other nervous system problem?   No   During the past year, have you received a transfusion of blood or blood     products, or been given immune (gamma) globulin or antiviral drug?   No   For women: Are you pregnant or is there a chance you could become        pregnant during the next month?   No   Have you received any vaccinations in the past 4 weeks?   No     Immunization questionnaire PT has diabetes, is undergoing treatment for cancer. Dr. Salazar is aware of this and has recommended this vaccine today.        Per orders of Dr. Salazar, injection of Tdap given by Lea Gilman. Patient instructed to remain in clinic for 15 minutes afterwards, and to report any adverse reaction to me immediately.       Screening performed by Lea Gilman on 1/8/2018 at 1:55 PM.

## 2018-01-08 NOTE — PROGRESS NOTES
Patient's instructions / PLAN:                                                        Plan:  1. Dermatology referral   2. Cerave cream - 1-2 times a day   3. Labs today   4. Continue same meds, same doses for now   5. Call back with effexor dose and I will give refills for 3 months   6. Make an appointment with the eye doctor   7. Tetanus shot today         ASSESSMENT & PLAN:                                                      (E11.9) Type 2 diabetes mellitus without complication, without long-term current use of insulin (H)  (primary encounter diagnosis)  Comment: Controlled    Plan: metFORMIN (GLUCOPHAGE) 1000 MG tablet,         OPHTHALMOLOGY ADULT REFERRAL, Hemoglobin A1c,         Albumin Random Urine Quantitative with Creat         Ratio        Continue same meds, same doses for now     (L98.9) Skin lesion  Comment:   Plan: DERMATOLOGY REFERRAL            (C50.111,  Z17.0) Malignant neoplasm of central portion of right breast in female, estrogen receptor positive (H)  Comment: Status post chemotherapy, so she will start radiation  Plan: Follow-up with oncology    (E78.5) Hyperlipidemia LDL goal <100  Comment:   Plan: Continue Lipitor    (F33.41) Recurrent major depressive disorder, in partial remission (H)  Comment:   Plan: Continue Effexor    (Z23) Need for Tdap vaccination  Comment:   Plan: TDAP VACCINE (ADACEL)               Chief Complaint:                                                      Follow up chronic medical problems   Depression    SUBJECTIVE:                                                    History of present illness     Breast cancer  -- finished chemo and she feels better  -- ready for radiation. The MD noticed a mole and asked her to come     Skin check  -- seborrheic keratosis lesion on the back on the bras line -- gets iritated from the bras  -- pigmented lesion on the R flank. - it doesn't look suspicious but we will ask derm to look at it  -- 2/4 cm redness on the scalp   -- patches of  redness on the L ebow     Depression  --Dr. Pinzon changed to Wellbutrin and Lexapro to Effexor to help with the hot flashes  --She started about a week ago.  She is tolerating it well    DM  --Takes medication regularly with no side effects  --Tries to follow-up on a low-carb diet    Hyperlipidemia  Takes Lipitor.--Reports no side effects    ROS:                                                      ROS: negative for fever, chills, cough, wheezes, chest pain, shortness of breath, vomiting, abdominal pain, leg swelling         OBJECTIVE:                                                    Physical Exam :    Blood pressure 122/84, pulse 65, temperature 97.6  F (36.4  C), temperature source Oral, weight 166 lb 8 oz (75.5 kg), SpO2 100 %, not currently breastfeeding.   NAD, appears comfortable  Skin: as above   HEENT: PERRLA, EOMI, pink conjunctiva, anicteric sclerae, bilateral tympanic membranes are clinically normal, oropharynx is normal color  Neck: supple, no JVD,No thyroidmegaly. Lymph nodes nonpalpable cervical and supraclavicular.  Chest: clear to auscultation bilaterally, good respiratory effort  Heart: S1 S2, RRR, no mgr appreciated  Abdomen: soft, not tender, no hepatosplenomegaly or masses appreciated, no abdominal bruit, present bowel sounds  Extremities: no edema, clubbing, cyanosis. Palpable pedal pulses bilaterally, Monofilament skin sensation is intact, no feet skin lesions   Neurologic: A, Ox3, no focal signs appreciated    PMHx: reviewed  Past Medical History:   Diagnosis Date     Cancer (H)      Diabetes (H)      Papanicolaou smear of cervix with low grade squamous intraepithelial lesion (LGSIL) 8/18/2017 8/18/17 LSIL/Neg HPV. Plan: cotest in 1 year      PSHx: reviewed  Past Surgical History:   Procedure Laterality Date     BIOPSY       BREAST SURGERY       MASTECTOMY          Meds: reviewed  Current Outpatient Prescriptions   Medication Sig Dispense Refill     Venlafaxine HCl (EFFEXOR PO) Take 50 mg  by mouth daily       ASPIRIN NOT PRESCRIBED (INTENTIONAL) Please choose reason not prescribed, below 1 each 0     metFORMIN (GLUCOPHAGE) 1000 MG tablet Take 1 tablet (1,000 mg) by mouth 2 times daily (with meals) 180 tablet 1     lisinopril (PRINIVIL/ZESTRIL) 20 MG tablet Take 0.5 tablets (10 mg) by mouth daily 90 tablet 3     atorvastatin (LIPITOR) 20 MG tablet Take 1 tablet (20 mg) by mouth daily 90 tablet 3       Soc Hx: reviewed  Fam Hx: reviewed          Archana Salazar MD  Internal Medicine

## 2018-01-09 LAB
CREAT UR-MCNC: 89 MG/DL
MICROALBUMIN UR-MCNC: 6 MG/L
MICROALBUMIN/CREAT UR: 6.65 MG/G CR (ref 0–25)

## 2018-01-10 ENCOUNTER — TELEPHONE (OUTPATIENT)
Dept: INTERNAL MEDICINE | Facility: CLINIC | Age: 58
End: 2018-01-10

## 2018-01-10 DIAGNOSIS — E11.9 TYPE 2 DIABETES MELLITUS WITHOUT COMPLICATION, WITHOUT LONG-TERM CURRENT USE OF INSULIN (H): Primary | ICD-10-CM

## 2018-01-10 RX ORDER — GLIPIZIDE 2.5 MG/1
2.5 TABLET, EXTENDED RELEASE ORAL DAILY
Qty: 30 TABLET | Refills: 1 | Status: SHIPPED | OUTPATIENT
Start: 2018-01-10 | End: 2018-02-19

## 2018-01-10 NOTE — LETTER
[facility]  [address], [address]  [place] [address]  [phone]        January 10, 2018    Cheryl Patel  [address] [place] [address] [address] [address]            Dear MsJahaira Patel:      The recent A1c is higher meaning the diabetes is not well controlled.   Recommendation:  -- continue same dose Metformin  -- add Glipizide 2.5 mg daily - I sent a new prescription  -- Please make a lab appointment for non fasting labs in 3 months  -- Please make an appointment few days after the labs to discuss about the results.     Sincerely,    Archana Salazar MD  Internal Medicine

## 2018-01-10 NOTE — TELEPHONE ENCOUNTER
Please call the patient:    The recent A1c is higher meaning the diabetes is not well controlled.   Recommendation:  -- continue same dose Metformin  -- add Glipizide 2.5 mg daily - I sent a new prescription  -- Please make a lab appointment for non fasting labs in 3 months  -- Please make an appointment few days after the labs to discuss about the results.       Please send letter( look for it in the letter section) + results

## 2018-01-23 ENCOUNTER — TRANSFERRED RECORDS (OUTPATIENT)
Dept: HEALTH INFORMATION MANAGEMENT | Facility: CLINIC | Age: 58
End: 2018-01-23

## 2018-01-23 NOTE — TELEPHONE ENCOUNTER
Unsure if letter was sent so will send today.Left msg for pt to call back for results and advisement.   CHANTELL Ward

## 2018-01-29 ENCOUNTER — TRANSFERRED RECORDS (OUTPATIENT)
Dept: HEALTH INFORMATION MANAGEMENT | Facility: CLINIC | Age: 58
End: 2018-01-29

## 2018-02-05 ENCOUNTER — TRANSFERRED RECORDS (OUTPATIENT)
Dept: HEALTH INFORMATION MANAGEMENT | Facility: CLINIC | Age: 58
End: 2018-02-05

## 2018-02-12 ENCOUNTER — TRANSFERRED RECORDS (OUTPATIENT)
Dept: HEALTH INFORMATION MANAGEMENT | Facility: CLINIC | Age: 58
End: 2018-02-12

## 2018-02-19 DIAGNOSIS — E11.9 TYPE 2 DIABETES MELLITUS WITHOUT COMPLICATION, WITHOUT LONG-TERM CURRENT USE OF INSULIN (H): ICD-10-CM

## 2018-02-21 ENCOUNTER — TRANSFERRED RECORDS (OUTPATIENT)
Dept: HEALTH INFORMATION MANAGEMENT | Facility: CLINIC | Age: 58
End: 2018-02-21

## 2018-02-22 RX ORDER — GLIPIZIDE 2.5 MG/1
TABLET, EXTENDED RELEASE ORAL
Qty: 90 TABLET | Refills: 1 | Status: SHIPPED | OUTPATIENT
Start: 2018-02-22 | End: 2018-04-30

## 2018-02-22 NOTE — TELEPHONE ENCOUNTER
"Requested Prescriptions   Pending Prescriptions Disp Refills     glipiZIDE (GLUCOTROL XL) 2.5 MG 24 hr tablet [Pharmacy Med Name: GLIPIZIDE ER 2.5MG  TAB] 30 tablet 1     Sig: TAKE ONE TABLET BY MOUTH ONCE DAILY    Sulfonylurea Agents Passed    2/19/2018  9:28 AM       Passed - Blood pressure less than 140/90 in past 6 months    BP Readings from Last 3 Encounters:   01/08/18 122/84   09/19/17 126/78   08/18/17 110/68                Passed - Patient has documented LDL within the past 12 mos.    Recent Labs   Lab Test  08/11/17   0832   LDL  87            Passed - Patient has had a Microalbumin in the past 12 mos.    Recent Labs   Lab Test  01/08/18   1356   MICROL  6   UMALCR  6.65            Passed - Patient has documented A1c within the specified period of time.    Recent Labs   Lab Test  01/08/18   1356   A1C  7.5*            Passed - Patient is age 18 or older       Passed - No active pregnancy on record       Passed - Patient has a recent creatinine (normal) within the past 12 mos.    Recent Labs   Lab Test 09/25/17   CR  0.75            Passed - Patient has not had a positive pregnancy test within the past 12 mos.       Passed - Patient has had an appointment with authorizing provider within the past 6 mos. or  within next 30 days    Patient had office visit in the last 6 months or has a visit in the next 30 days with authorizing provider.  See \"Patient Info\" tab in inbasket, or \"Choose Columns\" in Meds & Orders section of the refill encounter.            Pt is requesting a 90 day rx.  Last office visit 1/8/18.  Prescription approved per [facility] Refill Protocol.  Colette Peguero RN    "

## 2018-02-26 ENCOUNTER — TRANSFERRED RECORDS (OUTPATIENT)
Dept: HEALTH INFORMATION MANAGEMENT | Facility: CLINIC | Age: 58
End: 2018-02-26

## 2018-03-26 ENCOUNTER — TRANSFERRED RECORDS (OUTPATIENT)
Dept: HEALTH INFORMATION MANAGEMENT | Facility: CLINIC | Age: 58
End: 2018-03-26

## 2018-04-04 ENCOUNTER — TRANSFERRED RECORDS (OUTPATIENT)
Dept: HEALTH INFORMATION MANAGEMENT | Facility: CLINIC | Age: 58
End: 2018-04-04

## 2018-04-04 ENCOUNTER — HOSPITAL ENCOUNTER (OUTPATIENT)
Dept: MAMMOGRAPHY | Facility: CLINIC | Age: 58
Discharge: HOME | End: 2018-04-04
Attending: INTERNAL MEDICINE | Admitting: INTERNAL MEDICINE
Payer: OTHER GOVERNMENT

## 2018-04-04 DIAGNOSIS — Z12.31 VISIT FOR SCREENING MAMMOGRAM: ICD-10-CM

## 2018-04-04 PROCEDURE — 77067 SCR MAMMO BI INCL CAD: CPT | Mod: 52

## 2018-04-09 ENCOUNTER — APPOINTMENT (OUTPATIENT)
Dept: INTERVENTIONAL RADIOLOGY/VASCULAR | Facility: CLINIC | Age: 58
End: 2018-04-09
Attending: INTERNAL MEDICINE
Payer: OTHER GOVERNMENT

## 2018-04-09 ENCOUNTER — HOSPITAL ENCOUNTER (OUTPATIENT)
Facility: CLINIC | Age: 58
Discharge: HOME | End: 2018-04-09
Attending: RADIOLOGY | Admitting: RADIOLOGY
Payer: OTHER GOVERNMENT

## 2018-04-09 VITALS
SYSTOLIC BLOOD PRESSURE: 122 MMHG | BODY MASS INDEX: 27.32 KG/M2 | TEMPERATURE: 97.4 F | DIASTOLIC BLOOD PRESSURE: 78 MMHG | HEIGHT: 66 IN | RESPIRATION RATE: 20 BRPM | WEIGHT: 170 LBS | OXYGEN SATURATION: 96 %

## 2018-04-09 DIAGNOSIS — C80.1 CANCER (H): ICD-10-CM

## 2018-04-09 PROCEDURE — 25000128 H RX IP 250 OP 636: Performed by: RADIOLOGY

## 2018-04-09 PROCEDURE — 99152 MOD SED SAME PHYS/QHP 5/>YRS: CPT

## 2018-04-09 PROCEDURE — 36590 REMOVAL TUNNELED CV CATH: CPT

## 2018-04-09 PROCEDURE — 27210904 ZZH KIT CR6

## 2018-04-09 PROCEDURE — 27210820 ZZH WOUND GLUE CR3

## 2018-04-09 PROCEDURE — 25000125 ZZHC RX 250: Performed by: RADIOLOGY

## 2018-04-09 RX ORDER — LIDOCAINE HYDROCHLORIDE 10 MG/ML
INJECTION, SOLUTION EPIDURAL; INFILTRATION; INTRACAUDAL; PERINEURAL
Status: DISCONTINUED
Start: 2018-04-09 | End: 2018-04-09 | Stop reason: HOSPADM

## 2018-04-09 RX ORDER — FLUMAZENIL 0.1 MG/ML
0.2 INJECTION, SOLUTION INTRAVENOUS
Status: DISCONTINUED | OUTPATIENT
Start: 2018-04-09 | End: 2018-04-09 | Stop reason: HOSPADM

## 2018-04-09 RX ORDER — LIDOCAINE HYDROCHLORIDE 10 MG/ML
1-30 INJECTION, SOLUTION EPIDURAL; INFILTRATION; INTRACAUDAL; PERINEURAL
Status: COMPLETED | OUTPATIENT
Start: 2018-04-09 | End: 2018-04-09

## 2018-04-09 RX ORDER — CEFAZOLIN SODIUM 2 G/100ML
2 INJECTION, SOLUTION INTRAVENOUS
Status: COMPLETED | OUTPATIENT
Start: 2018-04-09 | End: 2018-04-09

## 2018-04-09 RX ORDER — NALOXONE HYDROCHLORIDE 0.4 MG/ML
.1-.4 INJECTION, SOLUTION INTRAMUSCULAR; INTRAVENOUS; SUBCUTANEOUS
Status: DISCONTINUED | OUTPATIENT
Start: 2018-04-09 | End: 2018-04-09 | Stop reason: HOSPADM

## 2018-04-09 RX ORDER — LIDOCAINE 40 MG/G
CREAM TOPICAL
Status: DISCONTINUED | OUTPATIENT
Start: 2018-04-09 | End: 2018-04-09 | Stop reason: HOSPADM

## 2018-04-09 RX ORDER — FENTANYL CITRATE 50 UG/ML
25-50 INJECTION, SOLUTION INTRAMUSCULAR; INTRAVENOUS EVERY 5 MIN PRN
Status: DISCONTINUED | OUTPATIENT
Start: 2018-04-09 | End: 2018-04-09 | Stop reason: HOSPADM

## 2018-04-09 RX ORDER — CEFAZOLIN SODIUM 2 G/100ML
INJECTION, SOLUTION INTRAVENOUS
Status: DISCONTINUED
Start: 2018-04-09 | End: 2018-04-09 | Stop reason: HOSPADM

## 2018-04-09 RX ORDER — FENTANYL CITRATE 50 UG/ML
INJECTION, SOLUTION INTRAMUSCULAR; INTRAVENOUS
Status: DISCONTINUED
Start: 2018-04-09 | End: 2018-04-09 | Stop reason: HOSPADM

## 2018-04-09 RX ORDER — SODIUM CHLORIDE 9 MG/ML
INJECTION, SOLUTION INTRAVENOUS CONTINUOUS
Status: DISCONTINUED | OUTPATIENT
Start: 2018-04-09 | End: 2018-04-09 | Stop reason: HOSPADM

## 2018-04-09 RX ADMIN — SODIUM CHLORIDE 1000 ML: 9 INJECTION, SOLUTION INTRAVENOUS at 10:57

## 2018-04-09 RX ADMIN — LIDOCAINE HYDROCHLORIDE 15 ML: 10 INJECTION, SOLUTION EPIDURAL; INFILTRATION; INTRACAUDAL; PERINEURAL at 10:55

## 2018-04-09 RX ADMIN — CEFAZOLIN SODIUM 2 G: 2 INJECTION, SOLUTION INTRAVENOUS at 10:44

## 2018-04-09 RX ADMIN — MIDAZOLAM 1 MG: 1 INJECTION INTRAMUSCULAR; INTRAVENOUS at 10:54

## 2018-04-09 RX ADMIN — FENTANYL CITRATE 50 MCG: 50 INJECTION INTRAMUSCULAR; INTRAVENOUS at 10:55

## 2018-04-09 NOTE — IP AVS SNAPSHOT
MRN:[id number]                      After Visit Summary   4/9/2018    Cheryl Patel    MRN: [id number]           Visit Information        Department      4/9/2018  9:01 AM [facility]          Review of your medicines      UNREVIEWED medicines. Ask your doctor about these medicines        Dose / Directions    ASPIRIN NOT PRESCRIBED   Commonly known as:  INTENTIONAL   Used for:  Type 2 diabetes mellitus without complication, without long-term current use of insulin (H)        Please choose reason not prescribed, below   Quantity:  1 each   Refills:  0       atorvastatin 20 MG tablet   Commonly known as:  LIPITOR   Used for:  Hyperlipidemia LDL goal <100        Dose:  20 mg   Take 1 tablet (20 mg) by mouth daily   Quantity:  90 tablet   Refills:  3       EFFEXOR PO        Dose:  50 mg   Take 50 mg by mouth daily   Refills:  0       glipiZIDE 2.5 MG 24 hr tablet   Commonly known as:  GLUCOTROL XL   Used for:  Type 2 diabetes mellitus without complication, without long-term current use of insulin (H)        TAKE ONE TABLET BY MOUTH ONCE DAILY   Quantity:  90 tablet   Refills:  1       lisinopril 20 MG tablet   Commonly known as:  PRINIVIL/ZESTRIL   Used for:  Benign essential hypertension        Dose:  10 mg   Take 0.5 tablets (10 mg) by mouth daily   Quantity:  90 tablet   Refills:  3       metFORMIN 1000 MG tablet   Commonly known as:  GLUCOPHAGE   Used for:  Type 2 diabetes mellitus without complication, without long-term current use of insulin (H)        Dose:  1000 mg   Take 1 tablet (1,000 mg) by mouth 2 times daily (with meals)   Quantity:  180 tablet   Refills:  0       TAMOXIFEN CITRATE PO        Take by mouth daily   Refills:  0                Protect others around you: Learn how to safely use, store and throw away your medicines at www.disposemymeds.org.         Follow-ups after your visit        Your next 10 appointments already scheduled     Apr 10, 2018  9:30 AM CDT    Office Visit with Lashawn Hernandez MD   [facility] ([facility]    [address]  [facility] [id number] [phone]           Bring a current list of meds and any records pertaining to this visit. For Physicals, please bring immunization records and any forms needing to be filled out. Please arrive 10 minutes early to complete paperwork.            Apr 23, 2018  8:45 AM CDT   LAB with RI LAB   [facility] ([facility])    [address]  [facility] [address]   [phone]           Please do not eat 10-12 hours before your appointment if you are coming in fasting for labs on lipids, cholesterol, or glucose (sugar). This does not apply to pregnant women. Water, hot tea and black coffee (with nothing added) are okay. Do not drink other fluids, diet soda or chew gum.            Apr 30, 2018 10:20 AM CDT   SHORT with Archana Chen MD   [facility] ([facility])    [address]  [facility] [id number] [phone]               Care Instructions        Further instructions from your care team         Going Home after the Removal of Your Port  ______________________________________________________________________    Patient Name:  Cheryl Patel  Today's Date:  April 9, 2018    The doctor who removed your port or catheter was: Dr. eVlez  at [facility]) in:    Interventional Radiology Department  When you get home:    No driving or drinking alcohol until tomorrow. You may still have side effects from   the medicine you received today (you may feel drowsy, unsteady or forgetful).    You should have an adult with you for the first 6 hours at home (radiology patients).    You may go back to your regular diet today.     If you take aspirin or Plavix, you may begin taking it again tomorrow. You may restart all other medicines today. Use pain medicine as  "directed.    Avoid heavy lifting or the overuse of your shoulder for three days.    Port site and bandage care    Keep the wound clean and dry for three days. Cover it with plastic before taking a shower.     Change the bandage if it gets wet or dirty. Use bacitracin (antibiotic cream) and clean gauze.     After three days, you may use Band-Aids until the wound has healed.    If you have oozing or bleeding from the port site or catheter (tube) site:   o Put direct pressure on the wound for 5 to 10 minutes with a gauze pad.  If you still have bleeding after 10 minutes, call your doctor.  o If you are bleeding a lot and can t control it with direct pressure, call 911.    Call your doctor at your primary care clinic if you have:    Swelling in your neck.    Signs of infection:   o fever over 100  F (37.8 C) under the tongue  o the wound is red, tender or draining.     Additional Information About Your Visit        [email or web address] Information     [employer] lets you send messages to your doctor, view your test results, renew your prescriptions, schedule appointments and more. To sign up, go to [email or web address] . Click on \"Log in\" on the left side of the screen, which will take you to the Welcome page. Then click on \"Sign up Now\" on the right side of the page.     You will be asked to enter the access code listed below, as well as some personal information. Please follow the directions to create your username and password.     Your access code is: [email or web address]  Expires: 2018 11:32 AM     Your access code will  in 90 days. If you need help or a new code, please call your [place] clinic or [phone].        Care EveryWhere ID     This is your Care EveryWhere ID. This could be used by other organizations to access your [place] medical records  [id number]        Your Vitals Were     Blood Pressure Temperature Respirations Height Weight Pulse Oximetry    114/72 (BP Location: Left arm) 97.4  F (36.3  C) (Temporal) " "20 1.676 m (5' 6\") 77.1 kg (170 lb) 98%    BMI (Body Mass Index)                   27.44 kg/m2            Primary Care Provider Office Phone # Fax #    Archana Adrianne Chen [phone]      Equal Access to Services     CHILANGO LY : Hadii aad ku hadasho Soomaali, waaxda luqadaha, qaybta kaalmada adeegyada, waxmaggy heriberto raleighn adearjun powers lajerryn . So [facility] [phone].    ATENCIÓN: Si habla español, tiene a zaidi disposición servicios gratuitos de asistencia lingüística. Llame al [phone].    We comply with applicable federal civil rights laws and [place] laws. We do not discriminate on the basis of race, color, national origin, age, disability, sex, sexual orientation, or gender identity.            Thank you!     Thank you for choosing [facility] for your care. Our goal is always to provide you with excellent care. Hearing back from our patients is one way we can continue to improve our services. Please take a few minutes to complete the written survey that you may receive in the mail after you visit. If you would like to speak to someone directly about your visit please contact Patient Relations at [phone]. Thank you!               Medication List: This is a list of all your medications and when to take them. Check marks below indicate your daily home schedule. Keep this list as a reference.      Medications           Morning Afternoon Evening Bedtime As Needed    ASPIRIN NOT PRESCRIBED   Commonly known as:  INTENTIONAL   Please choose reason not prescribed, below                                atorvastatin 20 MG tablet   Commonly known as:  LIPITOR   Take 1 tablet (20 mg) by mouth daily                                EFFEXOR PO   Take 50 mg by mouth daily                                glipiZIDE 2.5 MG 24 hr tablet   Commonly known as:  GLUCOTROL XL   TAKE ONE TABLET BY MOUTH ONCE DAILY                                lisinopril 20 MG tablet   Commonly known as:  " PRINIVIL/ZESTRIL   Take 0.5 tablets (10 mg) by mouth daily                                metFORMIN 1000 MG tablet   Commonly known as:  GLUCOPHAGE   Take 1 tablet (1,000 mg) by mouth 2 times daily (with meals)                                TAMOXIFEN CITRATE PO   Take by mouth daily

## 2018-04-09 NOTE — DISCHARGE INSTRUCTIONS
Going Home after the Removal of Your Port  ______________________________________________________________________    Patient Name:  Cheryl Patel  Today's Date:  April 9, 2018    The doctor who removed your port or catheter was: Dr. Velez  at [facility]) in:    Interventional Radiology Department  When you get home:    No driving or drinking alcohol until tomorrow. You may still have side effects from   the medicine you received today (you may feel drowsy, unsteady or forgetful).    You should have an adult with you for the first 6 hours at home (radiology patients).    You may go back to your regular diet today.     If you take aspirin or Plavix, you may begin taking it again tomorrow. You may restart all other medicines today. Use pain medicine as directed.    Avoid heavy lifting or the overuse of your shoulder for three days.    Port site and bandage care    Keep the wound clean and dry for three days. Cover it with plastic before taking a shower.     Change the bandage if it gets wet or dirty. Use bacitracin (antibiotic cream) and clean gauze.     After three days, you may use Band-Aids until the wound has healed.    If you have oozing or bleeding from the port site or catheter (tube) site:   o Put direct pressure on the wound for 5 to 10 minutes with a gauze pad.  If you still have bleeding after 10 minutes, call your doctor.  o If you are bleeding a lot and can t control it with direct pressure, call 911.    Call your doctor at your primary care clinic if you have:    Swelling in your neck.    Signs of infection:   o fever over 100  F (37.8 C) under the tongue  o the wound is red, tender or draining.

## 2018-04-09 NOTE — PROGRESS NOTES
Patient alert/oriented tolerate oral intake and ambulation prior to discharge. Dressing to removal site clean, dry and intact. Discharge instructions provided to both patient and spouse; questions answered.

## 2018-04-09 NOTE — IP AVS SNAPSHOT
[facility]    [address]    [facility] [address]    Phone:  [phone]                                       After Visit Summary   4/9/2018    Cheryl Patel    MRN: [id number]           After Visit Summary Signature Page     I have received my discharge instructions, and my questions have been answered. I have discussed any challenges I see with this plan with the nurse or doctor.    ..........................................................................................................................................  Patient/Patient Representative Signature      ..........................................................................................................................................  Patient Representative Print Name and Relationship to Patient    ..................................................               ................................................  Date                                            Time    ..........................................................................................................................................  Reviewed by Signature/Title    ...................................................              ..............................................  Date                                                            Time

## 2018-04-10 ENCOUNTER — OFFICE VISIT (OUTPATIENT)
Dept: OBGYN | Facility: CLINIC | Age: 58
End: 2018-04-10
Payer: OTHER GOVERNMENT

## 2018-04-10 VITALS
HEART RATE: 100 BPM | WEIGHT: 169.5 LBS | SYSTOLIC BLOOD PRESSURE: 124 MMHG | BODY MASS INDEX: 27.36 KG/M2 | DIASTOLIC BLOOD PRESSURE: 74 MMHG

## 2018-04-10 DIAGNOSIS — N92.6 IRREGULAR MENSTRUAL CYCLE: Primary | ICD-10-CM

## 2018-04-10 PROCEDURE — 99213 OFFICE O/P EST LOW 20 MIN: CPT | Mod: 25 | Performed by: OBSTETRICS & GYNECOLOGY

## 2018-04-10 PROCEDURE — 58100 BIOPSY OF UTERUS LINING: CPT | Performed by: OBSTETRICS & GYNECOLOGY

## 2018-04-10 PROCEDURE — 88305 TISSUE EXAM BY PATHOLOGIST: CPT | Performed by: OBSTETRICS & GYNECOLOGY

## 2018-04-10 NOTE — NURSING NOTE
"Chief Complaint   Patient presents with     Abnormal Uterine Bleeding       Initial /74  Pulse 100  Wt 169 lb 8 oz (76.9 kg)  LMP 2017 (Exact Date)  BMI 27.36 kg/m2 Estimated body mass index is 27.36 kg/(m^2) as calculated from the following:    Height as of 18: 5' 6\" (1.676 m).    Weight as of this encounter: 169 lb 8 oz (76.9 kg).  BP completed using cuff size: regular        The following HM Due: NONE      Margie Albrecht CMA             "

## 2018-04-10 NOTE — PROGRESS NOTES
Subjective: 57 year old female   Obstetric History       T1      L1     SAB0   TAB0   Ectopic0   Multiple0   Live Births1     for history of spotting.        LMP in 2017; no bleeding until 2018, during which she noted spotting x 3-4 days.   Denies regular menses or heavy bleeding.    Patient with history of right breast cancer, status post right mastectomy and LND; radiation therapy, now chemo completed.   Currently on tamoxifen.    Objective:  Physical exam:  GENERAL APPEARANCE: healthy, alert and no distress  ABDOMEN:  soft, nontender, no HSM or masses and bowel sounds normal  PELVIC:   Vulva: normal external female genitalia,   Urethra meatus: normal, non-tender  Vagina: normal vaginal mucosa, rugated, no lesions, normal physiologic discharge.    Cervix: multiparous cervix, no lesions.       Patient consented to endometrial biopsy; procedures, risks discussed and all questions answered.  Uterus sounded to 7 cm, endometrial biopsy performed without difficulty.   Patient tolerated procedure well.    Uterus: Normal contour, size, position; non-tender  Adnexa: No adnexal masses palpated, non-tender  Perineum: normal, no lesions, intact  Anus: normal, no lesions, hemorrhoids        Assessment/Plan:  (N92.6) Irregular menstrual cycle  (primary encounter diagnosis)  Comment: possibly late perimenopausal bleeding; however patient on tamoxifen.   Will evaluate for endometrial abnormality; endometrial biopsy done today.   ultrasound ordered for evaluation of polyps, fibroids.     I discussed with the patient that I will be leaving  in ; alternate providers for her future care were discussed.    Plan: ENDOMETRIAL BIOPSY W/O CERVICAL DILATION, US         Pelvic Complete with Transvaginal                This encounter was dictated using voice-recognition software; undetected errors may be present.    Lashawn Hernandez M.D.

## 2018-04-10 NOTE — MR AVS SNAPSHOT
After Visit Summary   4/10/2018    Cheryl Patel    MRN: [id number]           Patient Information     Date Of Birth          1960        Visit Information        Provider Department      4/10/2018 9:30 AM Lashawn Hernandez MD [facility]        Today's Diagnoses     Irregular menstrual cycle    -  1       Follow-ups after your visit        Your next 10 appointments already scheduled     Apr 23, 2018  8:45 AM CDT   LAB with RI LAB   [facility]    [address] [place] [address]   [phone]           Please do not eat 10-12 hours before your appointment if you are coming in fasting for labs on lipids, cholesterol, or glucose (sugar). This does not apply to pregnant women. Water, hot tea and black coffee (with nothing added) are okay. Do not drink other fluids, diet soda or chew gum.            Apr 30, 2018 10:20 AM CDT   SHORT with Archana Chen MD   [facility]    [address] [place] [address]   [phone]            May 03, 2018 11:15 AM CDT   US PELVIC COMPLETE W TRANSVAGINAL with RIUS1   [facility])    [address]  [facility] [address] [phone]           Please bring a list of your medicines (including vitamins, minerals and over-the-counter drugs). Also, tell your doctor about any allergies you may have. Wear comfortable clothes and leave your valuables at home.  Adults: Drink six 8-ounce glasses of fluid one hour before your exam. Do NOT empty your bladder.  If you need to empty your bladder before your exam, try to release only a little bit of urine. Then, drink another 8oz glass of fluid.  Children: Children who are potty trained should drink at least 4 cups (32 oz) of liquid 45 minutes to one hour prior to the exam. The child s  "bladder must be full in order to achieve a diagnostic exam. If your child is very uncomfortable or has an urgent need to pee, please notify a technologist; they will try to find out how much longer the wait may be and provide instructions to help relieve the pressure. Occasionally it is medically necessary to insert a urinary catheter to fill the bladder.  Please call the Imaging Department at your exam site with any questions.              Future tests that were ordered for you today     Open Future Orders        Priority Expected Expires Ordered    US Pelvic Complete with Transvaginal Routine  7/9/2018 4/10/2018            Who to contact     If you have questions or need follow up information about today's clinic visit or your schedule please contact [facility] directly at [phone].  Normal or non-critical lab and imaging results will be communicated to you by [facility], letter or phone within 4 business days after the clinic has received the results. If you do not hear from us within 7 days, please contact the clinic through [facility] or phone. If you have a critical or abnormal lab result, we will notify you by phone as soon as possible.  Submit refill requests through [employer] or call your pharmacy and they will forward the refill request to us. Please allow 3 business days for your refill to be completed.          Additional Information About Your Visit        [employer] Information     [employer] lets you send messages to your doctor, view your test results, renew your prescriptions, schedule appointments and more. To sign up, go to [email or web address] . Click on \"Log in\" on the left side of the screen, which will take you to the Welcome page. Then click on \"Sign up Now\" on the right side of the page.     You will be asked to enter the access code listed below, as well as some personal information. Please follow the directions to create your username and password.     Your access code is: " [email or web address]  Expires: 2018 11:32 AM     Your access code will  in 90 days. If you need help or a new code, please call your [facility] or [phone].        Care EveryWhere ID     This is your Care EveryWhere ID. This could be used by other organizations to access your [place] medical records  [id number]        Your Vitals Were     Pulse Last Period BMI (Body Mass Index)             100 2017 (Exact Date) 27.36 kg/m2          Blood Pressure from Last 3 Encounters:   04/10/18 124/74   18 122/78   18 122/84    Weight from Last 3 Encounters:   04/10/18 169 lb 8 oz (76.9 kg)   18 170 lb (77.1 kg)   18 166 lb 8 oz (75.5 kg)              We Performed the Following     ENDOMETRIAL BIOPSY W/O CERVICAL DILATION     Surgical pathology exam        Primary Care Provider Office Phone # Fax #    Archana Adrianne Chen [phone]       [address] [facility] [address]        Equal Access to Services     [facility]: Hadii aad ku hadasho Soomaali, waaxda luqadaha, qaybta kaalmada adeegyada, migdalia carvajalin hayaan bandar gaffney . So [facility] [phone].    ATENCIÓN: Si habla español, tiene a zaidi disposición servicios gratuitos de asistencia lingüística. Llame al [phone].    We comply with applicable federal civil rights laws and [place] laws. We do not discriminate on the basis of race, color, national origin, age, disability, sex, sexual orientation, or gender identity.            Thank you!     Thank you for choosing [facility]  for your care. Our goal is always to provide you with excellent care. Hearing back from our patients is one way we can continue to improve our services. Please take a few minutes to complete the written survey that you may receive in the mail after your visit with us. Thank you!             Your Updated Medication List - Protect others around you: Learn how to safely use, store and throw away your  medicines at www.disposemymeds.org.          This list is accurate as of 4/10/18 10:31 AM.  Always use your most recent med list.                   Brand Name Dispense Instructions for use Diagnosis    ASPIRIN NOT PRESCRIBED    INTENTIONAL    1 each    Please choose reason not prescribed, below    Type 2 diabetes mellitus without complication, without long-term current use of insulin (H)       atorvastatin 20 MG tablet    LIPITOR    90 tablet    Take 1 tablet (20 mg) by mouth daily    Hyperlipidemia LDL goal <100       EFFEXOR PO      Take 50 mg by mouth daily        glipiZIDE 2.5 MG 24 hr tablet    GLUCOTROL XL    90 tablet    TAKE ONE TABLET BY MOUTH ONCE DAILY    Type 2 diabetes mellitus without complication, without long-term current use of insulin (H)       lisinopril 20 MG tablet    PRINIVIL/ZESTRIL    90 tablet    Take 0.5 tablets (10 mg) by mouth daily    Benign essential hypertension       metFORMIN 1000 MG tablet    GLUCOPHAGE    180 tablet    Take 1 tablet (1,000 mg) by mouth 2 times daily (with meals)    Type 2 diabetes mellitus without complication, without long-term current use of insulin (H)       TAMOXIFEN CITRATE PO      Take by mouth daily

## 2018-04-11 LAB — COPATH REPORT: NORMAL

## 2018-04-23 DIAGNOSIS — E11.9 TYPE 2 DIABETES MELLITUS WITHOUT COMPLICATION, WITHOUT LONG-TERM CURRENT USE OF INSULIN (H): ICD-10-CM

## 2018-04-23 DIAGNOSIS — R73.9 ELEVATED BLOOD SUGAR: ICD-10-CM

## 2018-04-23 LAB
CREAT UR-MCNC: 122 MG/DL
HBA1C MFR BLD: 7.2 % (ref 0–5.6)
MICROALBUMIN UR-MCNC: 14 MG/L
MICROALBUMIN/CREAT UR: 11.72 MG/G CR (ref 0–25)

## 2018-04-23 PROCEDURE — 83036 HEMOGLOBIN GLYCOSYLATED A1C: CPT | Performed by: INTERNAL MEDICINE

## 2018-04-23 PROCEDURE — 36415 COLL VENOUS BLD VENIPUNCTURE: CPT | Performed by: INTERNAL MEDICINE

## 2018-04-23 PROCEDURE — 82043 UR ALBUMIN QUANTITATIVE: CPT | Performed by: INTERNAL MEDICINE

## 2018-04-30 ENCOUNTER — OFFICE VISIT (OUTPATIENT)
Dept: INTERNAL MEDICINE | Facility: CLINIC | Age: 58
End: 2018-04-30
Payer: OTHER GOVERNMENT

## 2018-04-30 VITALS
WEIGHT: 168.3 LBS | HEIGHT: 66 IN | OXYGEN SATURATION: 97 % | RESPIRATION RATE: 16 BRPM | BODY MASS INDEX: 27.05 KG/M2 | TEMPERATURE: 98 F | DIASTOLIC BLOOD PRESSURE: 68 MMHG | SYSTOLIC BLOOD PRESSURE: 90 MMHG | HEART RATE: 120 BPM

## 2018-04-30 DIAGNOSIS — Z17.0 MALIGNANT NEOPLASM OF CENTRAL PORTION OF RIGHT BREAST IN FEMALE, ESTROGEN RECEPTOR POSITIVE (H): Primary | ICD-10-CM

## 2018-04-30 DIAGNOSIS — E78.5 HYPERLIPIDEMIA LDL GOAL <100: ICD-10-CM

## 2018-04-30 DIAGNOSIS — E11.9 TYPE 2 DIABETES MELLITUS WITHOUT COMPLICATION, WITHOUT LONG-TERM CURRENT USE OF INSULIN (H): ICD-10-CM

## 2018-04-30 DIAGNOSIS — I10 BENIGN ESSENTIAL HYPERTENSION: ICD-10-CM

## 2018-04-30 DIAGNOSIS — F33.41 RECURRENT MAJOR DEPRESSIVE DISORDER, IN PARTIAL REMISSION (H): ICD-10-CM

## 2018-04-30 DIAGNOSIS — C50.111 MALIGNANT NEOPLASM OF CENTRAL PORTION OF RIGHT BREAST IN FEMALE, ESTROGEN RECEPTOR POSITIVE (H): Primary | ICD-10-CM

## 2018-04-30 PROCEDURE — 99214 OFFICE O/P EST MOD 30 MIN: CPT | Performed by: INTERNAL MEDICINE

## 2018-04-30 RX ORDER — GLIPIZIDE 2.5 MG/1
2.5 TABLET, EXTENDED RELEASE ORAL DAILY
Qty: 90 TABLET | Refills: 1 | Status: SHIPPED | OUTPATIENT
Start: 2018-04-30 | End: 2019-01-28

## 2018-04-30 RX ORDER — ATORVASTATIN CALCIUM 20 MG/1
20 TABLET, FILM COATED ORAL DAILY
Qty: 90 TABLET | Refills: 1 | Status: SHIPPED | OUTPATIENT
Start: 2018-04-30 | End: 2019-01-28

## 2018-04-30 RX ORDER — LISINOPRIL 10 MG/1
10 TABLET ORAL DAILY
Qty: 90 TABLET | Refills: 1 | Status: SHIPPED | OUTPATIENT
Start: 2018-04-30 | End: 2019-01-28

## 2018-04-30 NOTE — MR AVS SNAPSHOT
After Visit Summary   4/30/2018    Cheryl Patel    MRN: [id number]           Patient Information     Date Of Birth          1960        Visit Information        Provider Department      4/30/2018 10:20 AM Archana Chen MD [facility]        Today's Diagnoses     Recurrent major depressive disorder, in partial remission (H)    -  1    Hyperlipidemia LDL goal <100        Type 2 diabetes mellitus without complication, without long-term current use of insulin (H)        Benign essential hypertension          Care Instructions    Plan:  1. Continue same meds, same doses for now   2. Please make a lab appointment for fasting labs in September  3. Please make an appointment few days after the labs to discuss about the results.           Follow-ups after your visit        Your next 10 appointments already scheduled     May 03, 2018 11:15 AM CDT   US PELVIC COMPLETE W TRANSVAGINAL with RIUS1   [facility] ([facility])    [address]  [facility] [address] [phone]           Please bring a list of your medicines (including vitamins, minerals and over-the-counter drugs). Also, tell your doctor about any allergies you may have. Wear comfortable clothes and leave your valuables at home.  Adults: Drink six 8-ounce glasses of fluid one hour before your exam. Do NOT empty your bladder.  If you need to empty your bladder before your exam, try to release only a little bit of urine. Then, drink another 8oz glass of fluid.  Children: Children who are potty trained should drink at least 4 cups (32 oz) of liquid 45 minutes to one hour prior to the exam. The child s bladder must be full in order to achieve a diagnostic exam. If your child is very uncomfortable or has an urgent need to pee, please notify a technologist; they will try to find out how much longer the wait may be and provide instructions to help  "relieve the pressure. Occasionally it is medically necessary to insert a urinary catheter to fill the bladder.  Please call the Imaging Department at your exam site with any questions.              Who to contact     If you have questions or need follow up information about today's clinic visit or your schedule please contact [facility] directly at [phone].  Normal or non-critical lab and imaging results will be communicated to you by MyChart, letter or phone within 4 business days after the clinic has received the results. If you do not hear from us within 7 days, please contact the clinic through [facility] or phone. If you have a critical or abnormal lab result, we will notify you by phone as soon as possible.  Submit refill requests through [employer]LEASE[employer] or call your pharmacy and they will forward the refill request to us. Please allow 3 business days for your refill to be completed.          Additional Information About Your Visit        MyChart Information     RELEASEIF lets you send messages to your doctor, view your test results, renew your prescriptions, schedule appointments and more. To sign up, go to [email or web address] . Click on \"Log in\" on the left side of the screen, which will take you to the Welcome page. Then click on \"Sign up Now\" on the right side of the page.     You will be asked to enter the access code listed below, as well as some personal information. Please follow the directions to create your username and password.     Your access code is: [email or web address]  Expires: 2018 11:32 AM     Your access code will  in 90 days. If you need help or a new code, please call your [facility] or [phone].        Care EveryWhere ID     This is your Care EveryWhere ID. This could be used by other organizations to access your [facility] medical records  [id number]        Your Vitals Were     Pulse Temperature Respirations Height Pulse Oximetry Breastfeeding?    120 98  F (36.7 " " C) (Oral) 16 5' 6\" (1.676 m) 97% No    BMI (Body Mass Index)                   27.16 kg/m2            Blood Pressure from Last 3 Encounters:   04/30/18 90/68   04/10/18 124/74   04/09/18 122/78    Weight from Last 3 Encounters:   04/30/18 168 lb 4.8 oz (76.3 kg)   04/10/18 169 lb 8 oz (76.9 kg)   04/09/18 170 lb (77.1 kg)              We Performed the Following     DEPRESSION ACTION PLAN (DAP)          Today's Medication Changes          These changes are accurate as of 4/30/18 10:44 AM.  If you have any questions, ask your nurse or doctor.               These medicines have changed or have updated prescriptions.        Dose/Directions    glipiZIDE 2.5 MG 24 hr tablet   Commonly known as:  GLUCOTROL XL   This may have changed:  See the new instructions.   Used for:  Type 2 diabetes mellitus without complication, without long-term current use of insulin (H)   Changed by:  Archana Chen MD        Dose:  2.5 mg   Take 1 tablet (2.5 mg) by mouth daily   Quantity:  90 tablet   Refills:  1       * lisinopril 20 MG tablet   Commonly known as:  PRINIVIL/ZESTRIL   This may have changed:  Another medication with the same name was added. Make sure you understand how and when to take each.   Used for:  Benign essential hypertension   Changed by:  Archana Chen MD        Dose:  10 mg   Take 0.5 tablets (10 mg) by mouth daily   Quantity:  90 tablet   Refills:  3       * lisinopril 10 MG tablet   Commonly known as:  PRINIVIL/ZESTRIL   This may have changed:  You were already taking a medication with the same name, and this prescription was added. Make sure you understand how and when to take each.   Used for:  Benign essential hypertension, Type 2 diabetes mellitus without complication, without long-term current use of insulin (H)   Changed by:  Archana Chen MD        Dose:  10 mg   Take 1 tablet (10 mg) by mouth daily   Quantity:  90 tablet   Refills:  1       * Notice:  " This list has 2 medication(s) that are the same as other medications prescribed for you. Read the directions carefully, and ask your doctor or other care provider to review them with you.         Where to get your medicines      These medications were sent to [facility] [address] [place] [address]  [address] [facility] [address]     Phone:  [phone]     atorvastatin 20 MG tablet    glipiZIDE 2.5 MG 24 hr tablet    lisinopril 10 MG tablet    metFORMIN 1000 MG tablet                Primary Care Provider Office Phone # Fax #    Archana Chen [phone]       [address] [facility] [address]        Equal Access to Services     CHILANGO LY : Hadii bhavya phillipo Sojuancarlos, waaxda luqadaha, qaybta kaalmada adearjunyada, migdalia gaffney . So [facility] [phone].    ATENCIÓN: Si habla español, tiene a zaidi disposición servicios gratuitos de asistencia lingüística. [facility] [phone].    We comply with applicable federal civil rights laws and [place] laws. We do not discriminate on the basis of race, color, national origin, age, disability, sex, sexual orientation, or gender identity.            Thank you!     Thank you for choosing [facility]  for your care. Our goal is always to provide you with excellent care. Hearing back from our patients is one way we can continue to improve our services. Please take a few minutes to complete the written survey that you may receive in the mail after your visit with us. Thank you!             Your Updated Medication List - Protect others around you: Learn how to safely use, store and throw away your medicines at www.disposemymeds.org.          This list is accurate as of 4/30/18 10:44 AM.  Always use your most recent med list.                   Brand Name Dispense Instructions for use Diagnosis    ASPIRIN NOT PRESCRIBED    INTENTIONAL    1 each    Please choose  reason not prescribed, below    Type 2 diabetes mellitus without complication, without long-term current use of insulin (H)       atorvastatin 20 MG tablet    LIPITOR    90 tablet    Take 1 tablet (20 mg) by mouth daily    Hyperlipidemia LDL goal <100       EFFEXOR PO      Take 50 mg by mouth daily        glipiZIDE 2.5 MG 24 hr tablet    GLUCOTROL XL    90 tablet    Take 1 tablet (2.5 mg) by mouth daily    Type 2 diabetes mellitus without complication, without long-term current use of insulin (H)       * lisinopril 20 MG tablet    PRINIVIL/ZESTRIL    90 tablet    Take 0.5 tablets (10 mg) by mouth daily    Benign essential hypertension       * lisinopril 10 MG tablet    PRINIVIL/ZESTRIL    90 tablet    Take 1 tablet (10 mg) by mouth daily    Benign essential hypertension, Type 2 diabetes mellitus without complication, without long-term current use of insulin (H)       metFORMIN 1000 MG tablet    GLUCOPHAGE    180 tablet    Take 1 tablet (1,000 mg) by mouth 2 times daily (with meals)    Type 2 diabetes mellitus without complication, without long-term current use of insulin (H)       TAMOXIFEN CITRATE PO      Take by mouth daily        * Notice:  This list has 2 medication(s) that are the same as other medications prescribed for you. Read the directions carefully, and ask your doctor or other care provider to review them with you.

## 2018-04-30 NOTE — PATIENT INSTRUCTIONS
Plan:  1. Continue same meds, same doses for now   2. Please make a lab appointment for fasting labs in September  3. Please make an appointment few days after the labs to discuss about the results.

## 2018-04-30 NOTE — PROGRESS NOTES
Dr Salazar's note      Patient's instructions / PLAN:                                                        Plan:  1. Continue same meds, same doses for now   2. Please make a lab appointment for fasting labs in September  3. Please make an appointment few days after the labs to discuss about the results.       ASSESSMENT & PLAN:                                                      (C50.111,  Z17.0) Malignant neoplasm of central portion of right breast in female, estrogen receptor positive (H)  (primary encounter diagnosis)  Comment: doing well, finished chemo  Plan: f/u with onco    (E11.9) Type 2 diabetes mellitus without complication, without long-term current use of insulin (H)  Comment: Controlled , but A1c higher than before   Plan: glipiZIDE (GLUCOTROL XL) 2.5 MG 24 hr tablet,         lisinopril (PRINIVIL/ZESTRIL) 10 MG tablet,         metFORMIN (GLUCOPHAGE) 1000 MG tablet            (F33.41) Recurrent major depressive disorder, in partial remission (H)  Comment:   Plan: DEPRESSION ACTION PLAN (DAP)            (E78.5) Hyperlipidemia LDL goal <100  Comment: Controlled   Hx: Patient tries low chol diet, takes meds regularly with no side effects.   Plan: atorvastatin (LIPITOR) 20 MG tablet            (I10) Benign essential hypertension  Comment: Controlled   Hx: Patient tries low salt diet, takes meds regularly with no side effects.   Plan: lisinopril (PRINIVIL/ZESTRIL) 10 MG tablet             Chief complaint:                                                      Follow up chronic medical problems      SUBJECTIVE:   Cheryl Patel is a 57 year old female who presents to clinic today for the following health issues:    Discussed recent labs A1c 7.2. We discussed between increasing meds and pay attention to diet and do more exercise. She thinks she can improve diet and no need to change meds    Diabetes Follow-up      Patient is checking blood sugars: not at all    Diabetic concerns: None     Symptoms of  "hypoglycemia (low blood sugar): Usually only if she takes her Metformin and doesn't eat anything.      Paresthesias (numbness or burning in feet) or sores: No     Date of last diabetic eye exam: Over a year ago.     BP Readings from Last 2 Encounters:   04/10/18 124/74   04/09/18 122/78     Hemoglobin A1C (%)   Date Value   04/23/2018 7.2 (H)   01/08/2018 7.5 (H)     LDL Cholesterol Calculated (mg/dL)   Date Value   08/11/2017 87       Amount of exercise or physical activity: Has not had very much lately, but she plans to be more active now that it is getting nicer out. She would like to lose some of the weight she has gained over the last few years.     Problems taking medications regularly: No    Medication side effects: Weight gain? She wonders if this is from the tamoxifen.     Diet: Tries to stay away from anything white and starches, tries to eat a lot of chicken and vegetables.         Review of Systems:                                                      ROS: negative for fever, chills, cough, wheezes, chest pain, shortness of breath, vomiting, abdominal pain, leg swelling       OBJECTIVE:             Physical exam:  Blood pressure 90/68, pulse 120, temperature 98  F (36.7  C), temperature source Oral, resp. rate 16, height 5' 6\" (1.676 m), weight 168 lb 4.8 oz (76.3 kg), SpO2 97 %, not currently breastfeeding.   NAD, appears comfortable  Skin: no rashes   HEENT: PERRLA, EOMI, pink conjunctiva, anicteric sclerae, bilateral tympanic membranes are clinically normal, oropharynx is normal color  Neck: supple, no JVD,  No thyroidmegaly. Lymph nodes nonpalpable cervical and supraclavicular.  Chest: clear to auscultation bilaterally, good respiratory effort  Heart: S1 S2, RRR, no mgr appreciated  Abdomen: soft, not tender, no hepatosplenomegaly or masses appreciated, no abdominal bruit, present bowel sounds  Extremities: no edema, clubbing, cyanosis. Palpable pedal pulses bilaterally, Monofilament skin sensation " is intact, no feet skin lesions   Neurologic: A, Ox3, no focal signs appreciated    PMHx: reviewed  Past Medical History:   Diagnosis Date     Cancer (H)      Diabetes (H)      Papanicolaou smear of cervix with low grade squamous intraepithelial lesion (LGSIL) 8/18/2017 8/18/17 LSIL/Neg HPV. Plan: cotest in 1 year      PSHx: reviewed  Past Surgical History:   Procedure Laterality Date     BIOPSY       BREAST SURGERY       MASTECTOMY          Meds: reviewed  Current Outpatient Prescriptions   Medication Sig Dispense Refill     ASPIRIN NOT PRESCRIBED (INTENTIONAL) Please choose reason not prescribed, below 1 each 0     atorvastatin (LIPITOR) 20 MG tablet Take 1 tablet (20 mg) by mouth daily 90 tablet 3     glipiZIDE (GLUCOTROL XL) 2.5 MG 24 hr tablet TAKE ONE TABLET BY MOUTH ONCE DAILY 90 tablet 1     lisinopril (PRINIVIL/ZESTRIL) 20 MG tablet Take 0.5 tablets (10 mg) by mouth daily 90 tablet 3     metFORMIN (GLUCOPHAGE) 1000 MG tablet Take 1 tablet (1,000 mg) by mouth 2 times daily (with meals) 180 tablet 0     TAMOXIFEN CITRATE PO Take by mouth daily       Venlafaxine HCl (EFFEXOR PO) Take 50 mg by mouth daily         Soc Hx: reviewed  Fam Hx: reviewed          Archana Salazar MD  Internal Medicine

## 2018-04-30 NOTE — LETTER
My Depression Action Plan  Name: Cheryl Patel   Date of Birth 1960  Date: 4/30/2018    My doctor: Archana Chen   My clinic: [facility] [address]  [facility] [address]  [phone]          GREEN    ZONE   Good Control    What it looks like:     Things are going generally well. You have normal up s and down s. You may even feel depressed from time to time, but bad moods usually last less than a day.   What you need to do:  1. Continue to care for yourself (see self care plan)  2. Check your depression survival kit and update it as needed  3. Follow your physician s recommendations including any medication.  4. Do not stop taking medication unless you consult with your physician first.           YELLOW         ZONE Getting Worse    What it looks like:     Depression is starting to interfere with your life.     It may be hard to get out of bed; you may be starting to isolate yourself from others.    Symptoms of depression are starting to last most all day and this has happened for several days.     You may have suicidal thoughts but they are not constant.   What you need to do:     1. Call your care team, your response to treatment will improve if you keep your care team informed of your progress. Yellow periods are signs an adjustment may need to be made.     2. Continue your self-care, even if you have to fake it!    3. Talk to someone in your support network    4. Open up your depression survival kit           RED    ZONE Medical Alert - Get Help    What it looks like:     Depression is seriously interfering with your life.     You may experience these or other symptoms: You can t get out of bed most days, can t work or engage in other necessary activities, you have trouble taking care of basic hygiene, or basic responsibilities, thoughts of suicide or death that will not go away, self-injurious behavior.     What you need to do:  1. Call  your care team and request a same-day appointment. If they are not available (weekends or after hours) call your local crisis line, emergency room or 911.            Depression Self Care Plan / Survival Kit    Self-Care for Depression  Here s the deal. Your body and mind are really not as separate as most people think.  What you do and think affects how you feel and how you feel influences what you do and think. This means if you do things that people who feel good do, it will help you feel better.  Sometimes this is all it takes.  There is also a place for medication and therapy depending on how severe your depression is, so be sure to consult with your medical provider and/ or Behavioral Health Consultant if your symptoms are worsening or not improving.     In order to better manage my stress, I will:    Exercise  Get some form of exercise, every day. This will help reduce pain and release endorphins, the  feel good  chemicals in your brain. This is almost as good as taking antidepressants!  This is not the same as joining a gym and then never going! (they count on that by the way ) It can be as simple as just going for a walk or doing some gardening, anything that will get you moving.      Hygiene   Maintain good hygiene (Get out of bed in the morning, Make your bed, Brush your teeth, Take a shower, and Get dressed like you were going to work, even if you are unemployed).  If your clothes don't fit try to get ones that do.    Diet  I will strive to eat foods that are good for me, drink plenty of water, and avoid excessive sugar, caffeine, alcohol, and other mood-altering substances.  Some foods that are helpful in depression are: complex carbohydrates, B vitamins, flaxseed, fish or fish oil, fresh fruits and vegetables.    Psychotherapy  I agree to participate in Individual Therapy (if recommended).    Medication  If prescribed medications, I agree to take them.  Missing doses can result in serious side effects.   I understand that drinking alcohol, or other illicit drug use, may cause potential side effects.  I will not stop my medication abruptly without first discussing it with my provider.    Staying Connected With Others  I will stay in touch with my friends, family members, and my primary care provider/team.    Use your imagination  Be creative.  We all have a creative side; it doesn t matter if it s oil painting, sand castles, or mud pies! This will also kick up the endorphins.    Witness Beauty  (AKA stop and smell the roses) Take a look outside, even in mid-winter. Notice colors, textures. Watch the squirrels and birds.     Service to others  Be of service to others.  There is always someone else in need.  By helping others we can  get out of ourselves  and remember the really important things.  This also provides opportunities for practicing all the other parts of the program.    Humor  Laugh and be silly!  Adjust your TV habits for less news and crime-drama and more comedy.    Control your stress  Try breathing deep, massage therapy, biofeedback, and meditation. Find time to relax each day.     My support system    Clinic Contact:  Phone number:    Contact 1:  Phone number:    Contact 2:  Phone number:    [place]/:  Phone number:    Therapist:  Phone number:    [facility] crisis center:    Phone number:    Other community support:  Phone number:

## 2018-04-30 NOTE — NURSING NOTE
"Chief Complaint   Patient presents with     Diabetes       Initial BP 90/68 (BP Location: Left arm, Patient Position: Chair, Cuff Size: Adult Large)  Pulse 120  Temp 98  F (36.7  C) (Oral)  Resp 16  Ht 5' 6\" (1.676 m)  Wt 168 lb 4.8 oz (76.3 kg)  SpO2 97%  Breastfeeding? No  BMI 27.16 kg/m2 Estimated body mass index is 27.16 kg/(m^2) as calculated from the following:    Height as of this encounter: 5' 6\" (1.676 m).    Weight as of this encounter: 168 lb 4.8 oz (76.3 kg).  Medication Reconciliation: complete   Lea Gilman CMA      "

## 2018-05-03 ENCOUNTER — RADIANT APPOINTMENT (OUTPATIENT)
Dept: ULTRASOUND IMAGING | Facility: CLINIC | Age: 58
End: 2018-05-03
Attending: OBSTETRICS & GYNECOLOGY
Payer: OTHER GOVERNMENT

## 2018-05-03 DIAGNOSIS — N92.6 IRREGULAR MENSTRUAL CYCLE: ICD-10-CM

## 2018-05-03 PROCEDURE — 76856 US EXAM PELVIC COMPLETE: CPT | Performed by: OBSTETRICS & GYNECOLOGY

## 2018-05-03 PROCEDURE — 76830 TRANSVAGINAL US NON-OB: CPT | Performed by: OBSTETRICS & GYNECOLOGY

## 2018-05-04 ENCOUNTER — TELEPHONE (OUTPATIENT)
Dept: OBGYN | Facility: CLINIC | Age: 58
End: 2018-05-04

## 2018-05-04 NOTE — TELEPHONE ENCOUNTER
Please schedule following surgery:    Surgeon: Dr Lashawn Hernandez  Assist:  No  Location: [facility]  Date/time preference:  5/18/18 or as available    Surgery:  Hysteroscopy/d&c  Length of Surgery:  45 min  Diagnosis:  Thickened endometrium  Anesthesia type:  GENERAL    Special instructions / equipment:  myosure  Am admit or same day: SAME DAY  Bowel prep: No  Pre op: PCP  Office visit with surgeon prior to surgery: No  Schedule Post Op: 1 week or less

## 2018-05-07 NOTE — TELEPHONE ENCOUNTER
Type of surgery: hysteroscopy / d&c  Location of surgery: [facility]  Date and time of surgery: 5/18/18 @ 7:30 am  Surgeon: Dr. Hernandez  Pre-Op Appt Date: 5/14/18  Post-Op Appt Date: 5/22/18   Packet sent out: Yes  Pre-cert/Authorization completed:  No  Date:

## 2018-05-14 ENCOUNTER — OFFICE VISIT (OUTPATIENT)
Dept: INTERNAL MEDICINE | Facility: CLINIC | Age: 58
End: 2018-05-14
Payer: OTHER GOVERNMENT

## 2018-05-14 VITALS
OXYGEN SATURATION: 99 % | TEMPERATURE: 98 F | HEIGHT: 66 IN | BODY MASS INDEX: 27.43 KG/M2 | SYSTOLIC BLOOD PRESSURE: 110 MMHG | HEART RATE: 116 BPM | DIASTOLIC BLOOD PRESSURE: 66 MMHG | RESPIRATION RATE: 16 BRPM | WEIGHT: 170.7 LBS

## 2018-05-14 DIAGNOSIS — C50.111 MALIGNANT NEOPLASM OF CENTRAL PORTION OF RIGHT BREAST IN FEMALE, ESTROGEN RECEPTOR POSITIVE (H): ICD-10-CM

## 2018-05-14 DIAGNOSIS — N93.9 VAGINAL SPOTTING: ICD-10-CM

## 2018-05-14 DIAGNOSIS — Z01.818 PRE-OP EXAM: Primary | ICD-10-CM

## 2018-05-14 DIAGNOSIS — Z17.0 MALIGNANT NEOPLASM OF CENTRAL PORTION OF RIGHT BREAST IN FEMALE, ESTROGEN RECEPTOR POSITIVE (H): ICD-10-CM

## 2018-05-14 DIAGNOSIS — E11.9 TYPE 2 DIABETES MELLITUS WITHOUT COMPLICATION, WITHOUT LONG-TERM CURRENT USE OF INSULIN (H): ICD-10-CM

## 2018-05-14 LAB
ERYTHROCYTE [DISTWIDTH] IN BLOOD BY AUTOMATED COUNT: 14.2 % (ref 10–15)
HCT VFR BLD AUTO: 40.6 % (ref 35–47)
HGB BLD-MCNC: 12.9 G/DL (ref 11.7–15.7)
MCH RBC QN AUTO: 28.1 PG (ref 26.5–33)
MCHC RBC AUTO-ENTMCNC: 31.8 G/DL (ref 31.5–36.5)
MCV RBC AUTO: 89 FL (ref 78–100)
PLATELET # BLD AUTO: 235 10E9/L (ref 150–450)
RBC # BLD AUTO: 4.59 10E12/L (ref 3.8–5.2)
WBC # BLD AUTO: 3.5 10E9/L (ref 4–11)

## 2018-05-14 PROCEDURE — 85027 COMPLETE CBC AUTOMATED: CPT | Performed by: INTERNAL MEDICINE

## 2018-05-14 PROCEDURE — 99215 OFFICE O/P EST HI 40 MIN: CPT | Performed by: INTERNAL MEDICINE

## 2018-05-14 PROCEDURE — 93000 ELECTROCARDIOGRAM COMPLETE: CPT | Performed by: INTERNAL MEDICINE

## 2018-05-14 PROCEDURE — 36415 COLL VENOUS BLD VENIPUNCTURE: CPT | Performed by: INTERNAL MEDICINE

## 2018-05-14 NOTE — PROGRESS NOTES
[facility]  [address]  [facility] [address]  [phone]  Dept: [phone]    PRE-OP EVALUATION:  Today's date: 2018    Cheryl Patel (: 1960) presents for pre-operative evaluation assessment as requested by Dr. Hernandez.  She requires evaluation and anesthesia risk assessment prior to undergoing surgery/procedure for treatment of Hysteroscopy, Dilation and Curettage with morcellator.    Fax number for surgical facility: [facility]  Primary Physician: Archana Chen  Type of Anesthesia Anticipated: General    Patient has a Health Care Directive or Living Will:  NO    Preop Questions 2018   Who is doing your surgery? Dr Hernandez   What are you having done? d&C   Date of Surgery/Procedure: May 18   Facility or Hospital where procedure/surgery will be performed: [facility]   1.  Do you have a history of Heart attack, stroke, stent, coronary bypass surgery, or other heart surgery? No   2.  Do you ever have any pain or discomfort in your chest? No   3.  Do you have a history of  Heart Failure? No   4.   Are you troubled by shortness of breath when:  walking on a level surface, or up a slight hill, or at night? No   5.  Do you currently have a cold, bronchitis or other respiratory infection? No   6.  Do you have a cough, shortness of breath, or wheezing? No   7.  Do you sometimes get pains in the calves of your legs when you walk? No   8. Do you or anyone in your family have previous history of blood clots? No   9.  Do you or does anyone in your family have a serious bleeding problem such as prolonged bleeding following surgeries or cuts? No   10. Have you ever had problems with anemia or been told to take iron pills? No   11. Have you had any abnormal blood loss such as black, tarry or bloody stools, or abnormal vaginal bleeding? YES - - she will have the D&C   12. Have you ever had a blood transfusion? YES - 5 y ago  because GYN bleeding    13. Have you or any of your relatives ever had problems with anesthesia? No   14. Do you have sleep apnea, excessive snoring or daytime drowsiness? No   15. Do you have any prosthetic heart valves? No   16. Do you have prosthetic joints? No   17. Is there any chance that you may be pregnant? No     CC:  Preop for multiple medical problems.    HPI:    The patient is scheduled for D&C surgery with Dr. Hernandez on  May 18, 2018  Vaginal spotting   No other acute complaints.    Assessment:  1. V72.83H Preop general physical exam _ I do not see any major contraindications for the patient to go through the scheduled surgery.    The proposed surgical procedure is considered INTERMEDIATE,   surgery risk.    For above listed surgery and anesthesia, Patient is at MODERATE,  risk for surgery/procedure and perioperative/procedure complications.      Cardiovascular risk  Assessment  -- low  -- No further cardiac work up is needed before this surgery.     ECG:    sinus rhythm, no changes suggestive for ischemia    Pulmonary Risk Assessment -- low  --  The patient doesn't have chronic lung disease nor acute respiratory problems     Obstructive Sleep Apnea (or suspected sleep apnea) risk -- low      Anemia Assessment :  --  no recent hemoglobin available - we will check it      Blood Sugar Assessment  --  patient has controlled DM,   Lab Results   Component Value Date    A1C 7.2 04/23/2018    A1C 7.5 01/08/2018    A1C 6.4 07/26/2017            Anticoagulation assessment  --  patient does not take anticoagulation meds      (N92.0) Vaginal spotting  Comment:   Plan: EKG 12-lead complete w/read - Clinics, CBC with        platelets        Procedure, as above     (E11.9) Type 2 diabetes mellitus without complication, without long-term current use of insulin (H)  Comment: Controlled    Plan: EKG 12-lead complete w/read - Clinics, CBC with        platelets            (C50.111,  Z17.0) Malignant neoplasm of central  "portion of right breast in female, estrogen receptor positive (H)  Comment: in remission, s/p chemo  Plan: EKG 12-lead complete w/read - Clinics, CBC with        platelets               Plan:  1. In the morning of the surgery day you will not take any meds. you will resume the meds after surgery.  2. Labs today       Physical exam:    Blood pressure 110/66, pulse 116, temperature 98  F (36.7  C), temperature source Oral, resp. rate 16, height 5' 6\" (1.676 m), weight 170 lb 11.2 oz (77.4 kg), SpO2 99 %, not currently breastfeeding.   NAD, appears comfortable  Skin clear, no rashes  Neck: supple, no JVD,  no thyroidmegaly  Lymph nodes non palpable in the cervical, supraclavicular   Chest: clear to auscultation with good respiratory effort  Cardiac: S1S2, RRR, no mgr appreciated  Abdomen: soft, not tender, not distended, audible bowel sound, no hepatosplenomegaly, no palpable masses, no abdominal bruits  Extremities: no cyanosis, clubbing or edema.   Neuro: A, Ox3, no focal signs.        ROS:   as above     Patient Active Problem List   Diagnosis     Neutropenic fever (H)     Malignant neoplasm of central portion of right breast in female, estrogen receptor positive (H)     Advanced directives, counseling/discussion     Papanicolaou smear of cervix with low grade squamous intraepithelial lesion (LGSIL)     Type 2 diabetes mellitus without complication, without long-term current use of insulin (H)     Hyperlipidemia LDL goal <100     Recurrent major depressive disorder, in partial remission (H)        Past Medical History:   Diagnosis Date     Cancer (H)      Diabetes (H)      Papanicolaou smear of cervix with low grade squamous intraepithelial lesion (LGSIL) 8/18/2017 8/18/17 LSIL/Neg HPV. Plan: cotest in 1 year      Past Surgical History:   Procedure Laterality Date     BIOPSY       BREAST SURGERY       MASTECTOMY          PSHx: No complications with prior surgeries or anesthesia     Soc Hx: No daily alcohol, no " smoking     Family History   Problem Relation Age of Onset     Other - See Comments Mother      hysterectomy for benign     DIABETES Father       during bilateral leg amputation      Myocardial Infarction Maternal Grandmother 40     KIDNEY DISEASE Paternal Grandmother      On dialysis     Cardiac Sudden Death Paternal Grandfather 60      during angiogram     DIABETES Brother 40     Other - See Comments Sister      hysterectomy for benign     Breast Cancer No family hx of         All: reviewed    Meds: reviewed  Current Outpatient Prescriptions   Medication Sig Dispense Refill     ASPIRIN NOT PRESCRIBED (INTENTIONAL) Please choose reason not prescribed, below 1 each 0     atorvastatin (LIPITOR) 20 MG tablet Take 1 tablet (20 mg) by mouth daily 90 tablet 1     glipiZIDE (GLUCOTROL XL) 2.5 MG 24 hr tablet Take 1 tablet (2.5 mg) by mouth daily 90 tablet 1     lisinopril (PRINIVIL/ZESTRIL) 10 MG tablet Take 1 tablet (10 mg) by mouth daily 90 tablet 1     metFORMIN (GLUCOPHAGE) 1000 MG tablet Take 1 tablet (1,000 mg) by mouth 2 times daily (with meals) 180 tablet 0     TAMOXIFEN CITRATE PO Take by mouth daily       Venlafaxine HCl (EFFEXOR PO) Take 50 mg by mouth daily       [DISCONTINUED] lisinopril (PRINIVIL/ZESTRIL) 20 MG tablet Take 0.5 tablets (10 mg) by mouth daily 90 tablet 3            Archana Salazar MD  Internal Medicine       MEDICAL HISTORY:     Patient Active Problem List    Diagnosis Date Noted     Hyperlipidemia LDL goal <100 2018     Priority: Medium     Recurrent major depressive disorder, in partial remission (H) 2018     Priority: Medium     Type 2 diabetes mellitus without complication, without long-term current use of insulin (H) 2017     Priority: Medium     Papanicolaou smear of cervix with low grade squamous intraepithelial lesion (LGSIL) 2017     Priority: Medium     17 LSIL/Neg HPV. Plan: cotest in 1 year       Advanced directives, counseling/discussion  08/03/2017     Priority: Medium     Advance Care Planning 8/3/2017: ACP Review of Chart / Resources Provided:  Reviewed chart for advance care plan.  Cheryl Patel has no plan or code status on file. Discussed available resources and provided with information.   Added by Lea Gilman             Malignant neoplasm of central portion of right breast in female, estrogen receptor positive (H) 07/27/2017     Priority: Medium     Neutropenic fever (H) 07/25/2017     Priority: Medium      Past Medical History:   Diagnosis Date     Cancer (H)      Diabetes (H)      Papanicolaou smear of cervix with low grade squamous intraepithelial lesion (LGSIL) 8/18/2017 8/18/17 LSIL/Neg HPV. Plan: cotest in 1 year     Past Surgical History:   Procedure Laterality Date     BIOPSY       BREAST SURGERY       MASTECTOMY       Current Outpatient Prescriptions   Medication Sig Dispense Refill     ASPIRIN NOT PRESCRIBED (INTENTIONAL) Please choose reason not prescribed, below 1 each 0     atorvastatin (LIPITOR) 20 MG tablet Take 1 tablet (20 mg) by mouth daily 90 tablet 1     glipiZIDE (GLUCOTROL XL) 2.5 MG 24 hr tablet Take 1 tablet (2.5 mg) by mouth daily 90 tablet 1     lisinopril (PRINIVIL/ZESTRIL) 10 MG tablet Take 1 tablet (10 mg) by mouth daily 90 tablet 1     lisinopril (PRINIVIL/ZESTRIL) 20 MG tablet Take 0.5 tablets (10 mg) by mouth daily 90 tablet 3     metFORMIN (GLUCOPHAGE) 1000 MG tablet Take 1 tablet (1,000 mg) by mouth 2 times daily (with meals) 180 tablet 0     TAMOXIFEN CITRATE PO Take by mouth daily       Venlafaxine HCl (EFFEXOR PO) Take 50 mg by mouth daily       OTC products: None, except as noted above    No Known Allergies   Latex Allergy: NO    Social History   Substance Use Topics     Smoking status: Former Smoker     Types: Cigarettes     Quit date: 8/3/1997     Smokeless tobacco: Never Used     Alcohol use Yes      Comment: socially     History   Drug Use No       Recent Labs   Lab Test  04/23/18    0835  01/08/18   1356 09/25/17 08/11/17   0832  07/27/17   0619  07/26/17   0630   07/11/17   0901   HGB   --    --    --    --   9.6*  9.5*   < >  11.5*   PLT   --    --    --    --   152  132*   < >  220   INR   --    --    --    --    --    --    --   0.91   NA   --    --    --   140   --   142   < >   --    POTASSIUM   --    --   4.0  4.0   --   3.8   < >   --    CR   --    --   0.75  0.62   --   0.52   < >   --    A1C  7.2*  7.5*   --    --    --   6.4*   < >   --     < > = values in this interval not displayed.

## 2018-05-14 NOTE — MR AVS SNAPSHOT
After Visit Summary   5/14/2018    Cheryl Patel    MRN: [id number]           Patient Information     Date Of Birth          1960        Visit Information        Provider Department      5/14/2018 9:20 AM Archana Chen MD [facility]        Today's Diagnoses     Pre-op exam    -  1    Vaginal spotting        Type 2 diabetes mellitus without complication, without long-term current use of insulin (H)        Malignant neoplasm of central portion of right breast in female, estrogen receptor positive (H)           Follow-ups after your visit        Your next 10 appointments already scheduled     May 18, 2018   Procedure with Lashawn Hernandez MD   [facility] (--)    [address]  [facility] [address]   [phone]            May 22, 2018  9:15 AM CDT   SHORT with Lashawn Hernandez MD   [facility] ([facility])    [address]  [facility] [address]   [phone]              Who to contact     If you have questions or need follow up information about today's clinic visit or your schedule please contact [facility] directly at [phone].  Normal or non-critical lab and imaging results will be communicated to you by MyChart, letter or phone within 4 business days after the clinic has received the results. If you do not hear from us within 7 days, please contact the clinic through MyChart or phone. If you have a critical or abnormal lab result, we will notify you by phone as soon as possible.  Submit refill requests through [employer] or call your pharmacy and they will forward the refill request to us. Please allow 3 business days for your refill to be completed.          Additional Information About Your Visit        [employer] Information     [employer] lets you send messages to your doctor, view your test results, renew your prescriptions, schedule appointments and  "more. To sign up, go to [email or web address] . Click on \"Log in\" on the left side of the screen, which will take you to the Welcome page. Then click on \"Sign up Now\" on the right side of the page.     You will be asked to enter the access code listed below, as well as some personal information. Please follow the directions to create your username and password.     Your access code is: [email or web address]  Expires: 2018 11:32 AM     Your access code will  in 90 days. If you need help or a new code, please call your [facility] clinic or [phone].        Care EveryWhere ID     This is your Care EveryWhere ID. This could be used by other organizations to access your [facility] medical records  [id number]        Your Vitals Were     Pulse Temperature Respirations Height Pulse Oximetry Breastfeeding?    116 98  F (36.7  C) (Oral) 16 5' 6\" (1.676 m) 99% No    BMI (Body Mass Index)                   27.55 kg/m2            Blood Pressure from Last 3 Encounters:   18 110/66   18 90/68   04/10/18 124/74    Weight from Last 3 Encounters:   18 170 lb 11.2 oz (77.4 kg)   18 168 lb 4.8 oz (76.3 kg)   04/10/18 169 lb 8 oz (76.9 kg)              We Performed the Following     CBC with platelets     EKG 12-lead complete w/read - Clinics        Primary Care Provider Office Phone # Fax #    Archana Adrianne Chen [phone]       [address] [facility] [address]        Equal Access to Services     [facility] : Hadii bhavya Hadley, waaxda luqadaha, qaybta kaalmada cheng, migdalia lara. So [facility] [phone].    ATENCIÓN: Si habla español, tiene a zaidi disposición servicios gratuitos de asistencia lingüística. Llame al [phone].    We comply with applicable federal civil rights laws and [place] laws. We do not discriminate on the basis of race, color, national origin, age, disability, sex, sexual orientation, or gender " identity.            Thank you!     Thank you for choosing [facility]  for your care. Our goal is always to provide you with excellent care. Hearing back from our patients is one way we can continue to improve our services. Please take a few minutes to complete the written survey that you may receive in the mail after your visit with us. Thank you!             Your Updated Medication List - Protect others around you: Learn how to safely use, store and throw away your medicines at www.disposemymeds.org.          This list is accurate as of 5/14/18 10:11 AM.  Always use your most recent med list.                   Brand Name Dispense Instructions for use Diagnosis    ASPIRIN NOT PRESCRIBED    INTENTIONAL    1 each    Please choose reason not prescribed, below    Type 2 diabetes mellitus without complication, without long-term current use of insulin (H)       atorvastatin 20 MG tablet    LIPITOR    90 tablet    Take 1 tablet (20 mg) by mouth daily    Hyperlipidemia LDL goal <100       EFFEXOR PO      Take 50 mg by mouth daily        glipiZIDE 2.5 MG 24 hr tablet    GLUCOTROL XL    90 tablet    Take 1 tablet (2.5 mg) by mouth daily    Type 2 diabetes mellitus without complication, without long-term current use of insulin (H)       lisinopril 10 MG tablet    PRINIVIL/ZESTRIL    90 tablet    Take 1 tablet (10 mg) by mouth daily    Benign essential hypertension, Type 2 diabetes mellitus without complication, without long-term current use of insulin (H)       metFORMIN 1000 MG tablet    GLUCOPHAGE    180 tablet    Take 1 tablet (1,000 mg) by mouth 2 times daily (with meals)    Type 2 diabetes mellitus without complication, without long-term current use of insulin (H)       TAMOXIFEN CITRATE PO      Take by mouth daily

## 2018-05-14 NOTE — NURSING NOTE
"Patient here today for pre-op appointment. Vitals today are as follows:   Vital Signs 5/14/2018   Systolic 110   Diastolic 66   Pulse 116   Temperature 98   Respirations 16   Weight (LB) 170 lb 11.2 oz   Height 5' 6\"   BMI (Calculated) 27.61   O2 99   Medication reconciliation: completed.   Lea Gilman CMA    "

## 2018-05-14 NOTE — LETTER
[facility]  [address], [address]  [place] [address]  [phone]        May 19, 2018    Cheryl Patel  [address] [place] [id number]            Dear Ms. Cheryl Patel:    The recent blood tests results are in acceptable limits.    Sincerely,    Archana Salazar MD  Internal Medicine

## 2018-05-15 ASSESSMENT — PATIENT HEALTH QUESTIONNAIRE - PHQ9: SUM OF ALL RESPONSES TO PHQ QUESTIONS 1-9: 3

## 2018-05-17 NOTE — H&P (VIEW-ONLY)
[facility]  [address]  [facility] [address]  [phone]  Dept: [phone]    PRE-OP EVALUATION:  Today's date: 2018    Cheryl Patel (: 1960) presents for pre-operative evaluation assessment as requested by Dr. Hernandez.  She requires evaluation and anesthesia risk assessment prior to undergoing surgery/procedure for treatment of Hysteroscopy, Dilation and Curettage with morcellator.    Fax number for surgical facility: [facility]  Primary Physician: Archana Chen  Type of Anesthesia Anticipated: General    Patient has a Health Care Directive or Living Will:  NO    Preop Questions 2018   Who is doing your surgery? Dr Hernandez   What are you having done? d&C   Date of Surgery/Procedure: May 18   Facility or Hospital where procedure/surgery will be performed: [facility]   1.  Do you have a history of Heart attack, stroke, stent, coronary bypass surgery, or other heart surgery? No   2.  Do you ever have any pain or discomfort in your chest? No   3.  Do you have a history of  Heart Failure? No   4.   Are you troubled by shortness of breath when:  walking on a level surface, or up a slight hill, or at night? No   5.  Do you currently have a cold, bronchitis or other respiratory infection? No   6.  Do you have a cough, shortness of breath, or wheezing? No   7.  Do you sometimes get pains in the calves of your legs when you walk? No   8. Do you or anyone in your family have previous history of blood clots? No   9.  Do you or does anyone in your family have a serious bleeding problem such as prolonged bleeding following surgeries or cuts? No   10. Have you ever had problems with anemia or been told to take iron pills? No   11. Have you had any abnormal blood loss such as black, tarry or bloody stools, or abnormal vaginal bleeding? YES - - she will have the D&C   12. Have you ever had a blood transfusion? YES - 5 y ago  because GYN bleeding    13. Have you or any of your relatives ever had problems with anesthesia? No   14. Do you have sleep apnea, excessive snoring or daytime drowsiness? No   15. Do you have any prosthetic heart valves? No   16. Do you have prosthetic joints? No   17. Is there any chance that you may be pregnant? No     CC:  Preop for multiple medical problems.    HPI:    The patient is scheduled for D&C surgery with Dr. Hernandez on  May 18, 2018  Vaginal spotting   No other acute complaints.    Assessment:  1. V72.83H Preop general physical exam _ I do not see any major contraindications for the patient to go through the scheduled surgery.    The proposed surgical procedure is considered INTERMEDIATE,   surgery risk.    For above listed surgery and anesthesia, Patient is at MODERATE,  risk for surgery/procedure and perioperative/procedure complications.      Cardiovascular risk  Assessment  -- low  -- No further cardiac work up is needed before this surgery.     ECG:    sinus rhythm, no changes suggestive for ischemia    Pulmonary Risk Assessment -- low  --  The patient doesn't have chronic lung disease nor acute respiratory problems     Obstructive Sleep Apnea (or suspected sleep apnea) risk -- low      Anemia Assessment :  --  no recent hemoglobin available - we will check it      Blood Sugar Assessment  --  patient has controlled DM,   Lab Results   Component Value Date    A1C 7.2 04/23/2018    A1C 7.5 01/08/2018    A1C 6.4 07/26/2017            Anticoagulation assessment  --  patient does not take anticoagulation meds      (N92.0) Vaginal spotting  Comment:   Plan: EKG 12-lead complete w/read - Clinics, CBC with        platelets        Procedure, as above     (E11.9) Type 2 diabetes mellitus without complication, without long-term current use of insulin (H)  Comment: Controlled    Plan: EKG 12-lead complete w/read - Clinics, CBC with        platelets            (C50.111,  Z17.0) Malignant neoplasm of central  "portion of right breast in female, estrogen receptor positive (H)  Comment: in remission, s/p chemo  Plan: EKG 12-lead complete w/read - Clinics, CBC with        platelets               Plan:  1. In the morning of the surgery day you will not take any meds. you will resume the meds after surgery.  2. Labs today       Physical exam:    Blood pressure 110/66, pulse 116, temperature 98  F (36.7  C), temperature source Oral, resp. rate 16, height 5' 6\" (1.676 m), weight 170 lb 11.2 oz (77.4 kg), SpO2 99 %, not currently breastfeeding.   NAD, appears comfortable  Skin clear, no rashes  Neck: supple, no JVD,  no thyroidmegaly  Lymph nodes non palpable in the cervical, supraclavicular   Chest: clear to auscultation with good respiratory effort  Cardiac: S1S2, RRR, no mgr appreciated  Abdomen: soft, not tender, not distended, audible bowel sound, no hepatosplenomegaly, no palpable masses, no abdominal bruits  Extremities: no cyanosis, clubbing or edema.   Neuro: A, Ox3, no focal signs.        ROS:   as above     Patient Active Problem List   Diagnosis     Neutropenic fever (H)     Malignant neoplasm of central portion of right breast in female, estrogen receptor positive (H)     Advanced directives, counseling/discussion     Papanicolaou smear of cervix with low grade squamous intraepithelial lesion (LGSIL)     Type 2 diabetes mellitus without complication, without long-term current use of insulin (H)     Hyperlipidemia LDL goal <100     Recurrent major depressive disorder, in partial remission (H)        Past Medical History:   Diagnosis Date     Cancer (H)      Diabetes (H)      Papanicolaou smear of cervix with low grade squamous intraepithelial lesion (LGSIL) 8/18/2017 8/18/17 LSIL/Neg HPV. Plan: cotest in 1 year      Past Surgical History:   Procedure Laterality Date     BIOPSY       BREAST SURGERY       MASTECTOMY          PSHx: No complications with prior surgeries or anesthesia     Soc Hx: No daily alcohol, no " smoking     Family History   Problem Relation Age of Onset     Other - See Comments Mother      hysterectomy for benign     DIABETES Father       during bilateral leg amputation      Myocardial Infarction Maternal Grandmother 40     KIDNEY DISEASE Paternal Grandmother      On dialysis     Cardiac Sudden Death Paternal Grandfather 60      during angiogram     DIABETES Brother 40     Other - See Comments Sister      hysterectomy for benign     Breast Cancer No family hx of         All: reviewed    Meds: reviewed  Current Outpatient Prescriptions   Medication Sig Dispense Refill     ASPIRIN NOT PRESCRIBED (INTENTIONAL) Please choose reason not prescribed, below 1 each 0     atorvastatin (LIPITOR) 20 MG tablet Take 1 tablet (20 mg) by mouth daily 90 tablet 1     glipiZIDE (GLUCOTROL XL) 2.5 MG 24 hr tablet Take 1 tablet (2.5 mg) by mouth daily 90 tablet 1     lisinopril (PRINIVIL/ZESTRIL) 10 MG tablet Take 1 tablet (10 mg) by mouth daily 90 tablet 1     metFORMIN (GLUCOPHAGE) 1000 MG tablet Take 1 tablet (1,000 mg) by mouth 2 times daily (with meals) 180 tablet 0     TAMOXIFEN CITRATE PO Take by mouth daily       Venlafaxine HCl (EFFEXOR PO) Take 50 mg by mouth daily       [DISCONTINUED] lisinopril (PRINIVIL/ZESTRIL) 20 MG tablet Take 0.5 tablets (10 mg) by mouth daily 90 tablet 3            Archana Salazar MD  Internal Medicine       MEDICAL HISTORY:     Patient Active Problem List    Diagnosis Date Noted     Hyperlipidemia LDL goal <100 2018     Priority: Medium     Recurrent major depressive disorder, in partial remission (H) 2018     Priority: Medium     Type 2 diabetes mellitus without complication, without long-term current use of insulin (H) 2017     Priority: Medium     Papanicolaou smear of cervix with low grade squamous intraepithelial lesion (LGSIL) 2017     Priority: Medium     17 LSIL/Neg HPV. Plan: cotest in 1 year       Advanced directives, counseling/discussion  08/03/2017     Priority: Medium     Advance Care Planning 8/3/2017: ACP Review of Chart / Resources Provided:  Reviewed chart for advance care plan.  Cheryl Patel has no plan or code status on file. Discussed available resources and provided with information.   Added by Lea Gilman             Malignant neoplasm of central portion of right breast in female, estrogen receptor positive (H) 07/27/2017     Priority: Medium     Neutropenic fever (H) 07/25/2017     Priority: Medium      Past Medical History:   Diagnosis Date     Cancer (H)      Diabetes (H)      Papanicolaou smear of cervix with low grade squamous intraepithelial lesion (LGSIL) 8/18/2017 8/18/17 LSIL/Neg HPV. Plan: cotest in 1 year     Past Surgical History:   Procedure Laterality Date     BIOPSY       BREAST SURGERY       MASTECTOMY       Current Outpatient Prescriptions   Medication Sig Dispense Refill     ASPIRIN NOT PRESCRIBED (INTENTIONAL) Please choose reason not prescribed, below 1 each 0     atorvastatin (LIPITOR) 20 MG tablet Take 1 tablet (20 mg) by mouth daily 90 tablet 1     glipiZIDE (GLUCOTROL XL) 2.5 MG 24 hr tablet Take 1 tablet (2.5 mg) by mouth daily 90 tablet 1     lisinopril (PRINIVIL/ZESTRIL) 10 MG tablet Take 1 tablet (10 mg) by mouth daily 90 tablet 1     lisinopril (PRINIVIL/ZESTRIL) 20 MG tablet Take 0.5 tablets (10 mg) by mouth daily 90 tablet 3     metFORMIN (GLUCOPHAGE) 1000 MG tablet Take 1 tablet (1,000 mg) by mouth 2 times daily (with meals) 180 tablet 0     TAMOXIFEN CITRATE PO Take by mouth daily       Venlafaxine HCl (EFFEXOR PO) Take 50 mg by mouth daily       OTC products: None, except as noted above    No Known Allergies   Latex Allergy: NO    Social History   Substance Use Topics     Smoking status: Former Smoker     Types: Cigarettes     Quit date: 8/3/1997     Smokeless tobacco: Never Used     Alcohol use Yes      Comment: socially     History   Drug Use No       Recent Labs   Lab Test  04/23/18    0835  01/08/18   1356 09/25/17 08/11/17   0832  07/27/17   0619  07/26/17   0630   07/11/17   0901   HGB   --    --    --    --   9.6*  9.5*   < >  11.5*   PLT   --    --    --    --   152  132*   < >  220   INR   --    --    --    --    --    --    --   0.91   NA   --    --    --   140   --   142   < >   --    POTASSIUM   --    --   4.0  4.0   --   3.8   < >   --    CR   --    --   0.75  0.62   --   0.52   < >   --    A1C  7.2*  7.5*   --    --    --   6.4*   < >   --     < > = values in this interval not displayed.

## 2018-05-18 ENCOUNTER — ANESTHESIA EVENT (OUTPATIENT)
Dept: SURGERY | Facility: CLINIC | Age: 58
End: 2018-05-18
Payer: OTHER GOVERNMENT

## 2018-05-18 ENCOUNTER — HOSPITAL ENCOUNTER (OUTPATIENT)
Facility: CLINIC | Age: 58
Discharge: HOME | End: 2018-05-18
Attending: OBSTETRICS & GYNECOLOGY | Admitting: OBSTETRICS & GYNECOLOGY
Payer: OTHER GOVERNMENT

## 2018-05-18 ENCOUNTER — ANESTHESIA (OUTPATIENT)
Dept: SURGERY | Facility: CLINIC | Age: 58
End: 2018-05-18
Payer: OTHER GOVERNMENT

## 2018-05-18 VITALS
OXYGEN SATURATION: 99 % | BODY MASS INDEX: 27.32 KG/M2 | WEIGHT: 170 LBS | SYSTOLIC BLOOD PRESSURE: 135 MMHG | RESPIRATION RATE: 14 BRPM | TEMPERATURE: 96.1 F | DIASTOLIC BLOOD PRESSURE: 79 MMHG | HEIGHT: 66 IN

## 2018-05-18 DIAGNOSIS — Z98.890 STATUS POST HYSTEROSCOPY: Primary | ICD-10-CM

## 2018-05-18 LAB
CREAT SERPL-MCNC: 0.57 MG/DL (ref 0.52–1.04)
GFR SERPL CREATININE-BSD FRML MDRD: >90 ML/MIN/1.7M2
GLUCOSE BLDC GLUCOMTR-MCNC: 112 MG/DL (ref 70–99)
POTASSIUM SERPL-SCNC: 3.6 MMOL/L (ref 3.4–5.3)

## 2018-05-18 PROCEDURE — 37000009 ZZH ANESTHESIA TECHNICAL FEE, EACH ADDTL 15 MIN: Performed by: OBSTETRICS & GYNECOLOGY

## 2018-05-18 PROCEDURE — 88305 TISSUE EXAM BY PATHOLOGIST: CPT | Performed by: OBSTETRICS & GYNECOLOGY

## 2018-05-18 PROCEDURE — 25000128 H RX IP 250 OP 636: Performed by: NURSE ANESTHETIST, CERTIFIED REGISTERED

## 2018-05-18 PROCEDURE — 36415 COLL VENOUS BLD VENIPUNCTURE: CPT | Performed by: ANESTHESIOLOGY

## 2018-05-18 PROCEDURE — 71000013 ZZH RECOVERY PHASE 1 LEVEL 1 EA ADDTL HR: Performed by: OBSTETRICS & GYNECOLOGY

## 2018-05-18 PROCEDURE — 82565 ASSAY OF CREATININE: CPT | Performed by: ANESTHESIOLOGY

## 2018-05-18 PROCEDURE — 25800025 ZZH RX 258: Performed by: OBSTETRICS & GYNECOLOGY

## 2018-05-18 PROCEDURE — 36000058 ZZH SURGERY LEVEL 3 EA 15 ADDTL MIN: Performed by: OBSTETRICS & GYNECOLOGY

## 2018-05-18 PROCEDURE — 25000566 ZZH SEVOFLURANE, EA 15 MIN: Performed by: OBSTETRICS & GYNECOLOGY

## 2018-05-18 PROCEDURE — 37000008 ZZH ANESTHESIA TECHNICAL FEE, 1ST 30 MIN: Performed by: OBSTETRICS & GYNECOLOGY

## 2018-05-18 PROCEDURE — 82962 GLUCOSE BLOOD TEST: CPT

## 2018-05-18 PROCEDURE — 25000128 H RX IP 250 OP 636: Performed by: ANESTHESIOLOGY

## 2018-05-18 PROCEDURE — 71000012 ZZH RECOVERY PHASE 1 LEVEL 1 FIRST HR: Performed by: OBSTETRICS & GYNECOLOGY

## 2018-05-18 PROCEDURE — 84132 ASSAY OF SERUM POTASSIUM: CPT | Performed by: ANESTHESIOLOGY

## 2018-05-18 PROCEDURE — 58558 HYSTEROSCOPY BIOPSY: CPT | Performed by: OBSTETRICS & GYNECOLOGY

## 2018-05-18 PROCEDURE — 36000056 ZZH SURGERY LEVEL 3 1ST 30 MIN: Performed by: OBSTETRICS & GYNECOLOGY

## 2018-05-18 PROCEDURE — 25000132 ZZH RX MED GY IP 250 OP 250 PS 637: Performed by: OBSTETRICS & GYNECOLOGY

## 2018-05-18 PROCEDURE — 88305 TISSUE EXAM BY PATHOLOGIST: CPT | Mod: 26 | Performed by: OBSTETRICS & GYNECOLOGY

## 2018-05-18 PROCEDURE — 25000125 ZZHC RX 250: Performed by: NURSE ANESTHETIST, CERTIFIED REGISTERED

## 2018-05-18 PROCEDURE — 40000306 ZZH STATISTIC PRE PROC ASSESS II: Performed by: OBSTETRICS & GYNECOLOGY

## 2018-05-18 PROCEDURE — 71000027 ZZH RECOVERY PHASE 2 EACH 15 MINS: Performed by: OBSTETRICS & GYNECOLOGY

## 2018-05-18 PROCEDURE — 27210794 ZZH OR GENERAL SUPPLY STERILE: Performed by: OBSTETRICS & GYNECOLOGY

## 2018-05-18 RX ORDER — EPHEDRINE SULFATE 50 MG/ML
INJECTION, SOLUTION INTRAVENOUS PRN
Status: DISCONTINUED | OUTPATIENT
Start: 2018-05-18 | End: 2018-05-18

## 2018-05-18 RX ORDER — METOPROLOL TARTRATE 1 MG/ML
1-2 INJECTION, SOLUTION INTRAVENOUS EVERY 5 MIN PRN
Status: DISCONTINUED | OUTPATIENT
Start: 2018-05-18 | End: 2018-05-18 | Stop reason: HOSPADM

## 2018-05-18 RX ORDER — KETOROLAC TROMETHAMINE 30 MG/ML
30 INJECTION, SOLUTION INTRAMUSCULAR; INTRAVENOUS EVERY 6 HOURS PRN
Status: DISCONTINUED | OUTPATIENT
Start: 2018-05-18 | End: 2018-05-18 | Stop reason: HOSPADM

## 2018-05-18 RX ORDER — FENTANYL CITRATE 50 UG/ML
25-50 INJECTION, SOLUTION INTRAMUSCULAR; INTRAVENOUS
Status: DISCONTINUED | OUTPATIENT
Start: 2018-05-18 | End: 2018-05-18 | Stop reason: HOSPADM

## 2018-05-18 RX ORDER — HYDRALAZINE HYDROCHLORIDE 20 MG/ML
2.5-5 INJECTION INTRAMUSCULAR; INTRAVENOUS EVERY 10 MIN PRN
Status: DISCONTINUED | OUTPATIENT
Start: 2018-05-18 | End: 2018-05-18 | Stop reason: HOSPADM

## 2018-05-18 RX ORDER — OXYCODONE HYDROCHLORIDE 5 MG/1
5 TABLET ORAL
Status: DISCONTINUED | OUTPATIENT
Start: 2018-05-18 | End: 2018-05-18 | Stop reason: HOSPADM

## 2018-05-18 RX ORDER — MEPERIDINE HYDROCHLORIDE 50 MG/ML
12.5 INJECTION INTRAMUSCULAR; INTRAVENOUS; SUBCUTANEOUS
Status: DISCONTINUED | OUTPATIENT
Start: 2018-05-18 | End: 2018-05-18 | Stop reason: HOSPADM

## 2018-05-18 RX ORDER — ALBUTEROL SULFATE 0.83 MG/ML
2.5 SOLUTION RESPIRATORY (INHALATION) EVERY 4 HOURS PRN
Status: DISCONTINUED | OUTPATIENT
Start: 2018-05-18 | End: 2018-05-18 | Stop reason: HOSPADM

## 2018-05-18 RX ORDER — ACETAMINOPHEN 325 MG/1
975 TABLET ORAL ONCE
Status: COMPLETED | OUTPATIENT
Start: 2018-05-18 | End: 2018-05-18

## 2018-05-18 RX ORDER — OXYCODONE HYDROCHLORIDE 5 MG/1
5 TABLET ORAL EVERY 4 HOURS PRN
Status: DISCONTINUED | OUTPATIENT
Start: 2018-05-18 | End: 2018-05-18 | Stop reason: HOSPADM

## 2018-05-18 RX ORDER — SODIUM CHLORIDE, SODIUM LACTATE, POTASSIUM CHLORIDE, CALCIUM CHLORIDE 600; 310; 30; 20 MG/100ML; MG/100ML; MG/100ML; MG/100ML
INJECTION, SOLUTION INTRAVENOUS CONTINUOUS
Status: DISCONTINUED | OUTPATIENT
Start: 2018-05-18 | End: 2018-05-18 | Stop reason: HOSPADM

## 2018-05-18 RX ORDER — NALOXONE HYDROCHLORIDE 0.4 MG/ML
.1-.4 INJECTION, SOLUTION INTRAMUSCULAR; INTRAVENOUS; SUBCUTANEOUS
Status: DISCONTINUED | OUTPATIENT
Start: 2018-05-18 | End: 2018-05-18 | Stop reason: HOSPADM

## 2018-05-18 RX ORDER — IBUPROFEN 600 MG/1
600 TABLET, FILM COATED ORAL
Status: DISCONTINUED | OUTPATIENT
Start: 2018-05-18 | End: 2018-05-18 | Stop reason: HOSPADM

## 2018-05-18 RX ORDER — FENTANYL CITRATE 50 UG/ML
INJECTION, SOLUTION INTRAMUSCULAR; INTRAVENOUS PRN
Status: DISCONTINUED | OUTPATIENT
Start: 2018-05-18 | End: 2018-05-18

## 2018-05-18 RX ORDER — FENTANYL CITRATE 50 UG/ML
25-50 INJECTION, SOLUTION INTRAMUSCULAR; INTRAVENOUS EVERY 5 MIN PRN
Status: DISCONTINUED | OUTPATIENT
Start: 2018-05-18 | End: 2018-05-18 | Stop reason: HOSPADM

## 2018-05-18 RX ORDER — OXYCODONE HYDROCHLORIDE 5 MG/1
5-10 TABLET ORAL
Qty: 10 TABLET | Refills: 0 | Status: SHIPPED | OUTPATIENT
Start: 2018-05-18 | End: 2018-05-22

## 2018-05-18 RX ORDER — LIDOCAINE HYDROCHLORIDE 10 MG/ML
INJECTION, SOLUTION INFILTRATION; PERINEURAL PRN
Status: DISCONTINUED | OUTPATIENT
Start: 2018-05-18 | End: 2018-05-18

## 2018-05-18 RX ORDER — PROPOFOL 10 MG/ML
INJECTION, EMULSION INTRAVENOUS PRN
Status: DISCONTINUED | OUTPATIENT
Start: 2018-05-18 | End: 2018-05-18

## 2018-05-18 RX ORDER — ONDANSETRON 4 MG/1
4 TABLET, ORALLY DISINTEGRATING ORAL EVERY 30 MIN PRN
Status: DISCONTINUED | OUTPATIENT
Start: 2018-05-18 | End: 2018-05-18 | Stop reason: HOSPADM

## 2018-05-18 RX ORDER — ONDANSETRON 2 MG/ML
INJECTION INTRAMUSCULAR; INTRAVENOUS PRN
Status: DISCONTINUED | OUTPATIENT
Start: 2018-05-18 | End: 2018-05-18

## 2018-05-18 RX ORDER — ONDANSETRON 2 MG/ML
4 INJECTION INTRAMUSCULAR; INTRAVENOUS EVERY 30 MIN PRN
Status: DISCONTINUED | OUTPATIENT
Start: 2018-05-18 | End: 2018-05-18 | Stop reason: HOSPADM

## 2018-05-18 RX ORDER — HYDROMORPHONE HYDROCHLORIDE 1 MG/ML
.3-.5 INJECTION, SOLUTION INTRAMUSCULAR; INTRAVENOUS; SUBCUTANEOUS EVERY 10 MIN PRN
Status: DISCONTINUED | OUTPATIENT
Start: 2018-05-18 | End: 2018-05-18 | Stop reason: HOSPADM

## 2018-05-18 RX ADMIN — LIDOCAINE HYDROCHLORIDE 50 MG: 10 INJECTION, SOLUTION INFILTRATION; PERINEURAL at 07:46

## 2018-05-18 RX ADMIN — ACETAMINOPHEN 975 MG: 325 TABLET ORAL at 06:55

## 2018-05-18 RX ADMIN — SODIUM CHLORIDE, POTASSIUM CHLORIDE, SODIUM LACTATE AND CALCIUM CHLORIDE: 600; 310; 30; 20 INJECTION, SOLUTION INTRAVENOUS at 06:45

## 2018-05-18 RX ADMIN — MIDAZOLAM 2 MG: 1 INJECTION INTRAMUSCULAR; INTRAVENOUS at 07:41

## 2018-05-18 RX ADMIN — FENTANYL CITRATE 100 MCG: 50 INJECTION, SOLUTION INTRAMUSCULAR; INTRAVENOUS at 07:46

## 2018-05-18 RX ADMIN — EPHEDRINE SULFATE 15 MG: 50 INJECTION, SOLUTION INTRAVENOUS at 07:59

## 2018-05-18 RX ADMIN — PROPOFOL 180 MG: 10 INJECTION, EMULSION INTRAVENOUS at 07:46

## 2018-05-18 RX ADMIN — ONDANSETRON 4 MG: 2 INJECTION INTRAMUSCULAR; INTRAVENOUS at 08:08

## 2018-05-18 NOTE — IP AVS SNAPSHOT
MRN:[id number]                      After Visit Summary   5/18/2018    Cheryl Patel    MRN: [id number]           Thank you!     Thank you for choosing [facility] for your care. Our goal is always to provide you with excellent care. Hearing back from our patients is one way we can continue to improve our services. Please take a few minutes to complete the written survey that you may receive in the mail after you visit. If you would like to speak to someone directly about your visit please contact Patient Relations at [phone]. Thank you!          Patient Information     Date Of Birth          1960        About your hospital stay     You were admitted on:  May 18, 2018 You last received care in the:  [facility]    You were discharged on:  May 18, 2018       Who to Call     For medical emergencies, please call 911.  For non-urgent questions about your medical care, please call your primary care provider or clinic, [phone]  For questions related to your surgery, please call your surgery clinic        Attending Provider     Provider Specialty    Lashawn Hernandez MD OB/Gyn       Primary Care Provider Office Phone # Fax #    Archana Adrianne Chen [phone]      After Care Instructions     Discharge Instructions       Pelvic Rest. No tampons, douching or intercourse for  2  weeks.            Discharge Instructions       Patient to follow-up in clinic for post-op appointment as scheduled.    Notify physician if severe pain, fever >/=100.4, heavy bleeding (>1pad/hour), foul vaginal discharge.                  Your next 10 appointments already scheduled     May 22, 2018  9:15 AM CDT   SHORT with Lashawn Hernandez MD   [facility]    [address]  [facility] [address]   [phone]              Further instructions from your care team       Maximum  acetaminophen (Tylenol) dose from all sources should not exceed 4 grams (4000 mg) per day. You had 975mg at 7amToday.              DILATION AND CURETTAGE AND DILATION AND EVACUATION DISCHARGE INSTRUCTIONS    PLEASE RETURN TO THE CLINIC IN:  ____1 WEEK  ____2 WEEKS  ____4 WEEKS  ____6 WEEKS  MAKE THIS APPOINTMENT AFTER YOU GET HOME IF IT HAS NOT ALREADY BEEN SCHEDULED.    DO NOT DRIVE A CAR, DRINK ALCOHOL OR USE MACHINERY FOR THE NEXT 24 HOURS.  YOU SHOULD WAIT UNTIL YOU HAVE RECOVERED BEFORE MAKING ANY IMPORTANT DECISIONS.    PAIN AND DISCOMFORT  YOU MAY HAVE CRAMPS OR A LOW BACKACHE FOR 24 TO 48 HOURS.  TYLENOL (ACETAMINOPHEN) OR MOTRIN (IBUPROFEN) MAY HELP, OR YOUR DOCTOR MAY GIVE YOU PAIN MEDICINE.  CALL YOUR DOCTOR IF PAIN CANNOT BE CONTROLLED.  YOU MAY FEEL DROWSY AND WEAK FOR A DAY OR TWO.    VAGINAL DISCHARGE  YOU MAY HAVE SOME BLEEDING OR DISCHARGE FOR UP TO TWO WEEKS.  DO NOT DOUCHE, USE TAMPONS OR HAVE SEX (INTERCOURSE) FOR 2 WEEKS.  CALL YOUR DOCTOR IF YOU SOAK MORE THAN ONE MAXI PAD (SANITARY NAPKIN) PER HOUR, OR IF YOU PASS LARGE BLOOD CLOTS.    OTHER SYMPTOMS  YOU MAY HAVE A LOW FEVER FOR THE FIRST TWO DAYS.  CALL YOUR DOCTOR IF YOUR FEVER GOES OVER 101 DEGREES FAHRENHEIT.    IF YOU HAVE NAUSEA (FEEL SICK TO YOUR STOMACH), STAY IN BED.  TRY DRINKING A SMALL AMOUNT 7-UP, TEA OR SOUP.    DIET AND ACTIVITY  EAT LIGHT MEALS AND DRINK PLENTY OF FLUIDS FOR THE FIRST 24 HOURS (OR LONGER, IF YOU HAVE NAUSEA).    YOU MAY BATHE, SHOWER AND CLIMB STAIRS.  MOST WOMEN CAN RETURN TO WORK AFTER 24 HOURS.  YOU MAY GO BACK TO YOUR OTHER ACTIVITIES AFTER YOUR PAIN GOES AWAY.            GENERAL ANESTHESIA OR SEDATION ADULT DISCHARGE INSTRUCTIONS   SPECIAL PRECAUTIONS FOR 24 HOURS AFTER SURGERY    IT IS NOT UNUSUAL TO FEEL LIGHT-HEADED OR FAINT, UP TO 24 HOURS AFTER SURGERY OR WHILE TAKING PAIN MEDICATION.  IF YOU HAVE THESE SYMPTOMS; SIT FOR A FEW MINUTES BEFORE STANDING AND HAVE SOMEONE ASSIST YOU WHEN YOU GET UP TO  "WALK OR USE THE BATHROOM.    YOU SHOULD REST AND RELAX FOR THE NEXT 24 HOURS AND YOU MUST MAKE ARRANGEMENTS TO HAVE SOMEONE STAY WITH YOU FOR AT LEAST 24 HOURS AFTER YOUR DISCHARGE.  AVOID HAZARDOUS AND STRENUOUS ACTIVITIES.  DO NOT MAKE IMPORTANT DECISIONS FOR 24 HOURS.    DO NOT DRIVE ANY VEHICLE OR OPERATE MECHANICAL EQUIPMENT FOR 24 HOURS FOLLOWING THE END OF YOUR SURGERY.  EVEN THOUGH YOU MAY FEEL NORMAL, YOUR REACTIONS MAY BE AFFECTED BY THE MEDICATION YOU HAVE RECEIVED.    DO NOT DRINK ALCOHOLIC BEVERAGES FOR 24 HOURS FOLLOWING YOUR SURGERY.    DRINK CLEAR LIQUIDS (APPLE JUICE, GINGER ALE, 7-UP, BROTH, ETC.).  PROGRESS TO YOUR REGULAR DIET AS YOU FEEL ABLE.    YOU MAY HAVE A DRY MOUTH, A SORE THROAT, MUSCLES ACHES OR TROUBLE SLEEPING.  THESE SHOULD GO AWAY AFTER 24 HOURS.    CALL YOUR DOCTOR FOR ANY OF THE FOLLOWING:  SIGNS OF INFECTION (FEVER, GROWING TENDERNESS AT THE SURGERY SITE, A LARGE AMOUNT OF DRAINAGE OR BLEEDING, SEVERE PAIN, FOUL-SMELLING DRAINAGE, REDNESS OR SWELLING.    IT HAS BEEN OVER 8 TO 10 HOURS SINCE SURGERY AND YOU ARE STILL NOT ABLE TO URINATE (PASS WATER).       Pending Results     Date and Time Order Name Status Description    5/18/2018 0832 Surgical pathology exam In process             Admission Information     Date & Time Provider Department Dept. Phone    5/18/2018 Lashawn Hernandez MD [facility] Post [facility]est[facility] [phone]      Your Vitals Were     Blood Pressure Temperature Respirations Height Weight Last Period    111/64 97.2  F (36.2  C) (Temporal) 13 1.676 m (5' 5.98\") 77.1 kg (170 lb) 07/18/2017    Pulse Oximetry BMI (Body Mass Index)                94% 27.45 kg/m2          Backplane Information     Backplane lets you send messages to your doctor, view your test results, renew your prescriptions, schedule appointments and more. To sign up, go to [email or web address] . Click on \"Log in\" on the left side of the screen, which will take you to the Welcome page. " "Then click on \"Sign up Now\" on the right side of the page.     You will be asked to enter the access code listed below, as well as some personal information. Please follow the directions to create your username and password.     Your access code is: [email or web address]  Expires: 2018 11:32 AM     Your access code will  in 90 days. If you need help or a new code, please call your [place] clinic or [phone].        Care EveryWhere ID     This is your Care EveryWhere ID. This could be used by other organizations to access your [place] medical records  [id number]        Equal Access to Services     [facility]: Hadharpal Hadley, jose valencia, cathy anand, migdalia gaffney . So [facility] [phone].    ATENCIÓN: Si habla español, tiene a zaidi disposición servicios gratuitos de asistencia lingüística. Llame al [phone].    We comply with applicable federal civil rights laws and [place] laws. We do not discriminate on the basis of race, color, national origin, age, disability, sex, sexual orientation, or gender identity.               Review of your medicines      START taking        Dose / Directions    oxyCODONE IR 5 MG tablet   Commonly known as:  ROXICODONE   Used for:  Status post hysteroscopy        Dose:  5-10 mg   Take 1-2 tablets (5-10 mg) by mouth every 3 hours as needed for pain or other (Moderate to Severe)   Quantity:  10 tablet   Refills:  0         CONTINUE these medicines which have NOT CHANGED        Dose / Directions    atorvastatin 20 MG tablet   Commonly known as:  LIPITOR   Used for:  Hyperlipidemia LDL goal <100        Dose:  20 mg   Take 1 tablet (20 mg) by mouth daily   Quantity:  90 tablet   Refills:  1       EFFEXOR PO        Dose:  50 mg   Take 50 mg by mouth daily   Refills:  0       glipiZIDE 2.5 MG 24 hr tablet   Commonly known as:  GLUCOTROL XL   Used for:  Type 2 diabetes mellitus without complication, without long-term " current use of insulin (H)        Dose:  2.5 mg   Take 1 tablet (2.5 mg) by mouth daily   Quantity:  90 tablet   Refills:  1       lisinopril 10 MG tablet   Commonly known as:  PRINIVIL/ZESTRIL   Used for:  Benign essential hypertension, Type 2 diabetes mellitus without complication, without long-term current use of insulin (H)        Dose:  10 mg   Take 1 tablet (10 mg) by mouth daily   Quantity:  90 tablet   Refills:  1       metFORMIN 1000 MG tablet   Commonly known as:  GLUCOPHAGE   Used for:  Type 2 diabetes mellitus without complication, without long-term current use of insulin (H)        Dose:  1000 mg   Take 1 tablet (1,000 mg) by mouth 2 times daily (with meals)   Quantity:  180 tablet   Refills:  0       TAMOXIFEN CITRATE PO        Take by mouth daily   Refills:  0            Where to get your medicines      Some of these will need a paper prescription and others can be bought over the counter. Ask your nurse if you have questions.     Bring a paper prescription for each of these medications     oxyCODONE IR 5 MG tablet                Protect others around you: Learn how to safely use, store and throw away your medicines at www.disposemymeds.org.        Information about OPIOIDS     PRESCRIPTION OPIOIDS: WHAT YOU NEED TO KNOW   You have a prescription for an opioid (narcotic) pain medicine. Opioids can cause addiction. If you have a history of chemical dependency of any type, you are at a higher risk of becoming addicted to opioids. Only take this medicine after all other options have been tried. Take it for as short a time and as few doses as possible.     Do not:    Drive. If you drive while taking these medicines, you could be arrested for driving under the influence (DUI).    Operate heavy machinery    Do any other dangerous activities while taking these medicines.     Drink any alcohol while taking these medicines.      Take with any other medicines that contain acetaminophen. Read all labels  carefully. Look for the word  acetaminophen  or  Tylenol.  Ask your pharmacist if you have questions or are unsure.    Store your pills in a secure place, locked if possible. We will not replace any lost or stolen medicine. If you don t finish your medicine, please throw away (dispose) as directed by your pharmacist. The [place] Pollution [employer] has more information about safe disposal: https://www.pca.[facility].[place].us/living-green/managing-unwanted-medications    All opioids tend to cause constipation. Drink plenty of water and eat foods that have a lot of fiber, such as fruits, vegetables, prune juice, apple juice and high-fiber cereal. Take a laxative (Miralax, milk of magnesia, Colace, Senna) if you don t move your bowels at least every other day.              Medication List: This is a list of all your medications and when to take them. Check marks below indicate your daily home schedule. Keep this list as a reference.      Medications           Morning Afternoon Evening Bedtime As Needed    atorvastatin 20 MG tablet   Commonly known as:  LIPITOR   Take 1 tablet (20 mg) by mouth daily                                EFFEXOR PO   Take 50 mg by mouth daily                                glipiZIDE 2.5 MG 24 hr tablet   Commonly known as:  GLUCOTROL XL   Take 1 tablet (2.5 mg) by mouth daily                                lisinopril 10 MG tablet   Commonly known as:  PRINIVIL/ZESTRIL   Take 1 tablet (10 mg) by mouth daily                                metFORMIN 1000 MG tablet   Commonly known as:  GLUCOPHAGE   Take 1 tablet (1,000 mg) by mouth 2 times daily (with meals)                                oxyCODONE IR 5 MG tablet   Commonly known as:  ROXICODONE   Take 1-2 tablets (5-10 mg) by mouth every 3 hours as needed for pain or other (Moderate to Severe)                                TAMOXIFEN CITRATE PO   Take by mouth daily

## 2018-05-18 NOTE — ANESTHESIA POSTPROCEDURE EVALUATION
Patient: Cheryl Patel    Procedure(s):  Hysteroscopy, Dilation and Curettage - Wound Class: II-Clean Contaminated    Diagnosis:thickened endometrium  Diagnosis Additional Information: PREOPERATIVE DIAGNOSIS: abnormal bleeding on tamoxifen, abnormal endometrial stripe on pelvic ultrasound, rule out endometrial pathology  POSTOPERATIVE DIAGNOSIS: normal appearing endometrial cavity     SURGEON: Lashawn Hernandez MD   PROCEDURE PERFO, RMED: Hysteroscopy, dilation and curettage.     Anesthesia Type:  General, LMA    Note:  Anesthesia Post Evaluation    Patient location during evaluation: PACU  Patient participation: Able to fully participate in evaluation  Level of consciousness: awake  Pain management: adequate  Airway patency: patent  Cardiovascular status: acceptable  Respiratory status: acceptable  Hydration status: acceptable  PONV: controlled     Anesthetic complications: None          Last vitals:  Vitals:    05/18/18 0609 05/18/18 0820 05/18/18 0825   BP: 116/77 119/66 114/72   Resp: 15 (!) 71 10   Temp: 97.2  F (36.2  C)     SpO2: 97% 99% 98%         Electronically Signed By: Praneeth Diaz MD  May 18, 2018  8:48 AM

## 2018-05-18 NOTE — DISCHARGE INSTRUCTIONS
Maximum acetaminophen (Tylenol) dose from all sources should not exceed 4 grams (4000 mg) per day. You had 975mg at 7amToday.              DILATION AND CURETTAGE AND DILATION AND EVACUATION DISCHARGE INSTRUCTIONS    PLEASE RETURN TO THE CLINIC IN:  ____1 WEEK  ____2 WEEKS  ____4 WEEKS  ____6 WEEKS  MAKE THIS APPOINTMENT AFTER YOU GET HOME IF IT HAS NOT ALREADY BEEN SCHEDULED.    DO NOT DRIVE A CAR, DRINK ALCOHOL OR USE MACHINERY FOR THE NEXT 24 HOURS.  YOU SHOULD WAIT UNTIL YOU HAVE RECOVERED BEFORE MAKING ANY IMPORTANT DECISIONS.    PAIN AND DISCOMFORT  YOU MAY HAVE CRAMPS OR A LOW BACKACHE FOR 24 TO 48 HOURS.  TYLENOL (ACETAMINOPHEN) OR MOTRIN (IBUPROFEN) MAY HELP, OR YOUR DOCTOR MAY GIVE YOU PAIN MEDICINE.  CALL YOUR DOCTOR IF PAIN CANNOT BE CONTROLLED.  YOU MAY FEEL DROWSY AND WEAK FOR A DAY OR TWO.    VAGINAL DISCHARGE  YOU MAY HAVE SOME BLEEDING OR DISCHARGE FOR UP TO TWO WEEKS.  DO NOT DOUCHE, USE TAMPONS OR HAVE SEX (INTERCOURSE) FOR 2 WEEKS.  CALL YOUR DOCTOR IF YOU SOAK MORE THAN ONE MAXI PAD (SANITARY NAPKIN) PER HOUR, OR IF YOU PASS LARGE BLOOD CLOTS.    OTHER SYMPTOMS  YOU MAY HAVE A LOW FEVER FOR THE FIRST TWO DAYS.  CALL YOUR DOCTOR IF YOUR FEVER GOES OVER 101 DEGREES FAHRENHEIT.    IF YOU HAVE NAUSEA (FEEL SICK TO YOUR STOMACH), STAY IN BED.  TRY DRINKING A SMALL AMOUNT 7-UP, TEA OR SOUP.    DIET AND ACTIVITY  EAT LIGHT MEALS AND DRINK PLENTY OF FLUIDS FOR THE FIRST 24 HOURS (OR LONGER, IF YOU HAVE NAUSEA).    YOU MAY BATHE, SHOWER AND CLIMB STAIRS.  MOST WOMEN CAN RETURN TO WORK AFTER 24 HOURS.  YOU MAY GO BACK TO YOUR OTHER ACTIVITIES AFTER YOUR PAIN GOES AWAY.            GENERAL ANESTHESIA OR SEDATION ADULT DISCHARGE INSTRUCTIONS   SPECIAL PRECAUTIONS FOR 24 HOURS AFTER SURGERY    IT IS NOT UNUSUAL TO FEEL LIGHT-HEADED OR FAINT, UP TO 24 HOURS AFTER SURGERY OR WHILE TAKING PAIN MEDICATION.  IF YOU HAVE THESE SYMPTOMS; SIT FOR A FEW MINUTES BEFORE STANDING AND HAVE SOMEONE ASSIST YOU WHEN YOU GET  UP TO WALK OR USE THE BATHROOM.    YOU SHOULD REST AND RELAX FOR THE NEXT 24 HOURS AND YOU MUST MAKE ARRANGEMENTS TO HAVE SOMEONE STAY WITH YOU FOR AT LEAST 24 HOURS AFTER YOUR DISCHARGE.  AVOID HAZARDOUS AND STRENUOUS ACTIVITIES.  DO NOT MAKE IMPORTANT DECISIONS FOR 24 HOURS.    DO NOT DRIVE ANY VEHICLE OR OPERATE MECHANICAL EQUIPMENT FOR 24 HOURS FOLLOWING THE END OF YOUR SURGERY.  EVEN THOUGH YOU MAY FEEL NORMAL, YOUR REACTIONS MAY BE AFFECTED BY THE MEDICATION YOU HAVE RECEIVED.    DO NOT DRINK ALCOHOLIC BEVERAGES FOR 24 HOURS FOLLOWING YOUR SURGERY.    DRINK CLEAR LIQUIDS (APPLE JUICE, GINGER ALE, 7-UP, BROTH, ETC.).  PROGRESS TO YOUR REGULAR DIET AS YOU FEEL ABLE.    YOU MAY HAVE A DRY MOUTH, A SORE THROAT, MUSCLES ACHES OR TROUBLE SLEEPING.  THESE SHOULD GO AWAY AFTER 24 HOURS.    CALL YOUR DOCTOR FOR ANY OF THE FOLLOWING:  SIGNS OF INFECTION (FEVER, GROWING TENDERNESS AT THE SURGERY SITE, A LARGE AMOUNT OF DRAINAGE OR BLEEDING, SEVERE PAIN, FOUL-SMELLING DRAINAGE, REDNESS OR SWELLING.    IT HAS BEEN OVER 8 TO 10 HOURS SINCE SURGERY AND YOU ARE STILL NOT ABLE TO URINATE (PASS WATER).

## 2018-05-18 NOTE — IP AVS SNAPSHOT
[facility]est[facility]    [address]    [facility] [address]    Phone:  [phone]    Fax:  [phone]                                       After Visit Summary   5/18/2018    Cheryl Patel    MRN: [id number]           After Visit Summary Signature Page     I have received my discharge instructions, and my questions have been answered. I have discussed any challenges I see with this plan with the nurse or doctor.    ..........................................................................................................................................  Patient/Patient Representative Signature      ..........................................................................................................................................  Patient Representative Print Name and Relationship to Patient    ..................................................               ................................................  Date                                            Time    ..........................................................................................................................................  Reviewed by Signature/Title    ...................................................              ..............................................  Date                                                            Time

## 2018-05-18 NOTE — OP NOTE
INDICATION FOR PROCEDURE: Cheryl Patel is a 57 year old female with irregular bleeding.   Patient with history of breast cancer, currently on tamoxifen. She was evaluated in clinic and an endometrial biopsy was notable for insufficient tissue.    An ultrasound of the pelvis that was performed with endometrial stripe 20 mm, however, findings I believe could also be suggestive of non-proliferative changes from tamoxifen.   Due to inadequate biopsy and risk for endometrial pathology on tamoxifen, I recommended further evaluation by hysteroscopy/d&c.    The procedure and risks were outlined in detail, informed consent was obtained and she was therefore taken to the operating room.     PREOPERATIVE DIAGNOSIS: abnormal bleeding on tamoxifen, abnormal endometrial stripe on pelvic ultrasound, rule out endometrial pathology  POSTOPERATIVE DIAGNOSIS: normal appearing endometrial cavity    SURGEON: Lashawn Hernandez MD   PROCEDURE PERFORMED: Hysteroscopy, dilation and curettage.   ANESTHESIA: General endotracheal anesthesia.   ESTIMATED BLOOD LOSS: 5 cc   URINE OUTPUT: 10 cc   IV FLUIDS RECEIVED: 500 cc of crystalloid.   Hysteroscopy fluids: deficit approximately 50 cc NS  FINDINGS: Normal anteverted uterus on examination under anesthesia by bimanual exam. Cavity with scant endometrial lining; scattered small calcifications   SPECIMENS REMOVED:   1. Endocervical curettage.   2. Endometrial curettage.     COMPLICATIONS: None.   DESCRIPTION OF PROCEDURE: Patient was taken to the operating room where general endotracheal anesthesia was established without difficulty. She was prepped and draped in the normal sterile fashion in the low lithotomy position. A pause for cause was performed with proper identification of the patient, procedure, and allergies.   The bladder was emptied by a catheter with clear yellow urine. [place] speculum was placed in the patient's vagina.  A single-toothed tenaculum was placed on the anterior  lip of the cervix at the 12 o'clock position. The cervix was dilated using the [place] dilators until a 5 mm scope could be easily inserted. The hysteroscope was inserted through the cervix and the cavity was visualized with the findings as stated above. The hysteroscope was then removed. The curettage was then performed at this time. First starting with endocervical curettage, which were then passed off, and then uterine curettings were then performed. All specimens were sent to pathology for evaluation.  Single-tooth tenaculum was removed from the cervix. The surgical site was carefully inspected to assess for bleeding. Excellent hemostasis was noted. All instruments were removed from the patient's vagina. Sponge, lap and instrument counts were correct x2. The patient tolerated the procedure well. She was taken to the recovery area in stable condition.       BOBBI DUKE MD

## 2018-05-18 NOTE — ANESTHESIA CARE TRANSFER NOTE
Patient: Cheryl Patel    Procedure(s):  Hysteroscopy, Dilation and Curettage with morcellator - Wound Class: II-Clean Contaminated    Diagnosis: thickened endometrium  Diagnosis Additional Information: No value filed.    Anesthesia Type:   General, LMA     Note:  Airway :Face Mask  Patient transferred to:PACU  Handoff Report: Identifed the Patient, Identified the Reponsible Provider, Reviewed the pertinent medical history, Discussed the surgical course, Reviewed Intra-OP anesthesia mangement and issues during anesthesia, Set expectations for post-procedure period and Allowed opportunity for questions and acknowledgement of understanding      Vitals: (Last set prior to Anesthesia Care Transfer)    CRNA VITALS  5/18/2018 0748 - 5/18/2018 0823      5/18/2018             Pulse: 82    SpO2: 100 %    Resp Rate (observed): 11                Electronically Signed By: STEVEN Vargas CRNA  May 18, 2018  8:23 AM

## 2018-05-19 NOTE — PROGRESS NOTES
Subjective: 57 year old female   Obstetric History       T1      L1     SAB0   TAB0   Ectopic0   Multiple0   Live Births1          status post Hysteroscopy, dilation and curettage.  on 18, here for post-op/incision check.      Doing well, denies fever, abnormal vaginal discharge.     Good pain control.    States minimal vaginal bleeding.    +dysuria, frequency x 2 days.        Objective:  EXAM:    Constitutional: healthy, alert and no distress  Gastrointestinal: Abdomen soft, non-tender. Incision intact, no erthema, drainage.    Labs:   FINAL DIAGNOSIS:   A: Endocervix, curettings-   - Scant fragments of benign endocervical glands without evidence of   squamous intraepithelial lesion, glandular   dysplasia, or malignancy.     B: Endometrium, curettings-   - Scant detached fragments of inactive/atrophic endometrium without   diagnostic polyp formation, hyperplasia,   or malignancy.     Electronically signed out by:     Ariella Cooper M.D.           Assessment/Plan:  (Z48.89) Aftercare following surgery  (primary encounter diagnosis)  Comment: doing well post-op.   Pathology results reviewed, benign.    Patient will likely be on tamoxifen for 5-10 years; urged follow-up if any recurrent bleeding.  Plan: Urine Microscopic            (R30.0) Dysuria  Comment: UA consistent with UTI; will treat.  Plan: *UA reflex to Microscopic and Culture ([place]         and [facility] (except [place] and         [place]), Urine Culture Aerobic Bacterial,         sulfamethoxazole-trimethoprim (BACTRIM         DS/SEPTRA DS) 800-160 MG per tablet            (R82.90) Nonspecific finding on examination of urine  Comment:   Plan: Urine Culture Aerobic Bacterial,         sulfamethoxazole-trimethoprim (BACTRIM         DS/SEPTRA DS) 800-160 MG per tablet

## 2018-05-20 LAB — GLUCOSE BLDC GLUCOMTR-MCNC: 152 MG/DL (ref 70–99)

## 2018-05-21 LAB — COPATH REPORT: NORMAL

## 2018-05-22 ENCOUNTER — OFFICE VISIT (OUTPATIENT)
Dept: OBGYN | Facility: CLINIC | Age: 58
End: 2018-05-22
Payer: OTHER GOVERNMENT

## 2018-05-22 ENCOUNTER — TRANSFERRED RECORDS (OUTPATIENT)
Dept: HEALTH INFORMATION MANAGEMENT | Facility: CLINIC | Age: 58
End: 2018-05-22

## 2018-05-22 VITALS
SYSTOLIC BLOOD PRESSURE: 118 MMHG | WEIGHT: 171 LBS | BODY MASS INDEX: 27.48 KG/M2 | DIASTOLIC BLOOD PRESSURE: 80 MMHG | HEIGHT: 66 IN

## 2018-05-22 DIAGNOSIS — R82.90 NONSPECIFIC FINDING ON EXAMINATION OF URINE: ICD-10-CM

## 2018-05-22 DIAGNOSIS — R30.0 DYSURIA: ICD-10-CM

## 2018-05-22 DIAGNOSIS — Z48.89 AFTERCARE FOLLOWING SURGERY: Primary | ICD-10-CM

## 2018-05-22 LAB
ALBUMIN UR-MCNC: NEGATIVE MG/DL
APPEARANCE UR: ABNORMAL
BACTERIA #/AREA URNS HPF: ABNORMAL /HPF
BILIRUB UR QL STRIP: NEGATIVE
COLOR UR AUTO: YELLOW
GLUCOSE UR STRIP-MCNC: NEGATIVE MG/DL
HGB UR QL STRIP: ABNORMAL
KETONES UR STRIP-MCNC: NEGATIVE MG/DL
LEUKOCYTE ESTERASE UR QL STRIP: ABNORMAL
NITRATE UR QL: NEGATIVE
NON-SQ EPI CELLS #/AREA URNS LPF: ABNORMAL /LPF
PH UR STRIP: 5.5 PH (ref 5–7)
RBC #/AREA URNS AUTO: ABNORMAL /HPF
SOURCE: ABNORMAL
SP GR UR STRIP: 1.02 (ref 1–1.03)
UROBILINOGEN UR STRIP-ACNC: 0.2 EU/DL (ref 0.2–1)
WBC #/AREA URNS AUTO: ABNORMAL /HPF

## 2018-05-22 PROCEDURE — 87088 URINE BACTERIA CULTURE: CPT | Performed by: OBSTETRICS & GYNECOLOGY

## 2018-05-22 PROCEDURE — 99213 OFFICE O/P EST LOW 20 MIN: CPT | Performed by: OBSTETRICS & GYNECOLOGY

## 2018-05-22 PROCEDURE — 87086 URINE CULTURE/COLONY COUNT: CPT | Performed by: OBSTETRICS & GYNECOLOGY

## 2018-05-22 PROCEDURE — 87186 SC STD MICRODIL/AGAR DIL: CPT | Performed by: OBSTETRICS & GYNECOLOGY

## 2018-05-22 PROCEDURE — 81001 URINALYSIS AUTO W/SCOPE: CPT | Performed by: OBSTETRICS & GYNECOLOGY

## 2018-05-22 RX ORDER — SULFAMETHOXAZOLE/TRIMETHOPRIM 800-160 MG
1 TABLET ORAL 2 TIMES DAILY
Qty: 10 TABLET | Refills: 0 | Status: SHIPPED | OUTPATIENT
Start: 2018-05-22 | End: 2018-05-27

## 2018-05-22 NOTE — NURSING NOTE
"Chief Complaint   Patient presents with     Follow Up For     surgery 18 hysteroscopy       Initial /80 (BP Location: Left arm, Patient Position: Chair, Cuff Size: Adult Regular)  Ht 5' 6\" (1.676 m)  Wt 171 lb (77.6 kg)  LMP 2017  BMI 27.6 kg/m2 Estimated body mass index is 27.6 kg/(m^2) as calculated from the following:    Height as of this encounter: 5' 6\" (1.676 m).    Weight as of this encounter: 171 lb (77.6 kg).  BP completed using cuff size: regular        The following HM Due: NONE      Nikki Mcclellan CMA           "

## 2018-05-22 NOTE — MR AVS SNAPSHOT
"              After Visit Summary   2018    Cheryl Patel    MRN: [id number]           Patient Information     Date Of Birth          1960        Visit Information        Provider Department      2018 9:15 AM Lashawn Hernandez MD [facility]        Today's Diagnoses     Aftercare following surgery    -  1    Dysuria        Nonspecific finding on examination of urine           Follow-ups after your visit        Who to contact     If you have questions or need follow up information about today's clinic visit or your schedule please contact [facility] directly at [phone].  Normal or non-critical lab and imaging results will be communicated to you by SourceClearhart, letter or phone within 4 business days after the clinic has received the results. If you do not hear from us within 7 days, please contact the clinic through Source[employer] or phone. If you have a critical or abnormal lab result, we will notify you by phone as soon as possible.  Submit refill requests through [employer] or call your pharmacy and they will forward the refill request to us. Please allow 3 business days for your refill to be completed.          Additional Information About Your Visit        MyChart Information     [employer] lets you send messages to your doctor, view your test results, renew your prescriptions, schedule appointments and more. To sign up, go to [email or web address] . Click on \"Log in\" on the left side of the screen, which will take you to the Welcome page. Then click on \"Sign up Now\" on the right side of the page.     You will be asked to enter the access code listed below, as well as some personal information. Please follow the directions to create your username and password.     Your access code is: [email or web address]  Expires: 2018 11:32 AM     Your access code will  in 90 days. If you need help or a new code, please call your [facility] or [phone].        Care " "EveryWhere ID     This is your Care EveryWhere ID. This could be used by other organizations to access your [place] medical records  [id number]        Your Vitals Were     Height Last Period BMI (Body Mass Index)             5' 6\" (1.676 m) 07/18/2017 27.6 kg/m2          Blood Pressure from Last 3 Encounters:   05/22/18 118/80   05/18/18 135/79   05/14/18 110/66    Weight from Last 3 Encounters:   05/22/18 171 lb (77.6 kg)   05/18/18 170 lb (77.1 kg)   05/14/18 170 lb 11.2 oz (77.4 kg)              We Performed the Following     *UA reflex to Microscopic and Culture ([place] and [facility] (except [place] and [place])     Urine Culture Aerobic Bacterial     Urine Microscopic          Today's Medication Changes          These changes are accurate as of 5/22/18 12:19 PM.  If you have any questions, ask your nurse or doctor.               Start taking these medicines.        Dose/Directions    sulfamethoxazole-trimethoprim 800-160 MG per tablet   Commonly known as:  BACTRIM DS/SEPTRA DS   Used for:  Dysuria, Nonspecific finding on examination of urine   Started by:  Lashawn Hernandez MD        Dose:  1 tablet   Take 1 tablet by mouth 2 times daily for 5 days   Quantity:  10 tablet   Refills:  0         Stop taking these medicines if you haven't already. Please contact your care team if you have questions.     oxyCODONE IR 5 MG tablet   Commonly known as:  ROXICODONE   Stopped by:  Lashawn Hernandez MD                Where to get your medicines      These medications were sent to [facility] [address] [place] [address]     Phone:  [phone]     sulfamethoxazole-trimethoprim 800-160 MG per tablet                Primary Care Provider Office Phone # Fax #    Archana Chen [phone]       [address]  [facility] [address]        Equal Access to Services     JOSE EDUARDO LY AH: Vale aquino " Leonie, waelaineda luqadaha, qaybta kaalmada cheng, migdalia fernandez clementearjun liyahtree laTrishashea marco. So [facility] [place] [facility] [phone].    ATENCIÓN: Si jeremie mcclelland, tiene a zaidi disposición servicios gratuitos de asistencia lingüística. Lauren al [phone].    We comply with applicable federal civil rights laws and [place] laws. We do not discriminate on the basis of race, color, national origin, age, disability, sex, sexual orientation, or gender identity.            Thank you!     Thank you for choosing [facility]  for your care. Our goal is always to provide you with excellent care. Hearing back from our patients is one way we can continue to improve our services. Please take a few minutes to complete the written survey that you may receive in the mail after your visit with us. Thank you!             Your Updated Medication List - Protect others around you: Learn how to safely use, store and throw away your medicines at www.disposemymeds.org.          This list is accurate as of 5/22/18 12:19 PM.  Always use your most recent med list.                   Brand Name Dispense Instructions for use Diagnosis    atorvastatin 20 MG tablet    LIPITOR    90 tablet    Take 1 tablet (20 mg) by mouth daily    Hyperlipidemia LDL goal <100       EFFEXOR PO      Take 50 mg by mouth daily        glipiZIDE 2.5 MG 24 hr tablet    GLUCOTROL XL    90 tablet    Take 1 tablet (2.5 mg) by mouth daily    Type 2 diabetes mellitus without complication, without long-term current use of insulin (H)       lisinopril 10 MG tablet    PRINIVIL/ZESTRIL    90 tablet    Take 1 tablet (10 mg) by mouth daily    Benign essential hypertension, Type 2 diabetes mellitus without complication, without long-term current use of insulin (H)       metFORMIN 1000 MG tablet    GLUCOPHAGE    180 tablet    Take 1 tablet (1,000 mg) by mouth 2 times daily (with meals)    Type 2 diabetes mellitus without complication, without long-term current use of insulin (H)        sulfamethoxazole-trimethoprim 800-160 MG per tablet    BACTRIM DS/SEPTRA DS    10 tablet    Take 1 tablet by mouth 2 times daily for 5 days    Dysuria, Nonspecific finding on examination of urine       TAMOXIFEN CITRATE PO      Take by mouth daily

## 2018-05-24 LAB
BACTERIA SPEC CULT: ABNORMAL
SPECIMEN SOURCE: ABNORMAL

## 2018-07-17 ENCOUNTER — TELEPHONE (OUTPATIENT)
Dept: INTERNAL MEDICINE | Facility: CLINIC | Age: 58
End: 2018-07-17

## 2018-07-17 NOTE — TELEPHONE ENCOUNTER
Panel Management Review      Patient has the following on her problem list:     Depression / Dysthymia review    Measure:  Needs PHQ-9 score of 4 or less during index window.  Administer PHQ-9 and if score is 5 or more, send encounter to provider for next steps.    5 - 7 month window range: N/A    PHQ-9 SCORE 8/18/2017 1/8/2018 5/14/2018   Total Score 14 1 3       If PHQ-9 recheck is 5 or more, route to provider for next steps.    Patient is due for:  None    Diabetes    ASA: Failed    Last A1C  Lab Results   Component Value Date    A1C 7.2 04/23/2018    A1C 7.5 01/08/2018    A1C 6.4 07/26/2017     A1C tested: Passed    Last LDL:    Lab Results   Component Value Date    CHOL 166 08/11/2017     Lab Results   Component Value Date    HDL 51 08/11/2017     Lab Results   Component Value Date    LDL 87 08/11/2017     Lab Results   Component Value Date    TRIG 138 08/11/2017     No results found for: CHOLHDLRATIO  Lab Results   Component Value Date    NHDL 115 08/11/2017       Is the patient on a Statin? YES             Is the patient on Aspirin? NO    Medications     HMG CoA Reductase Inhibitors    atorvastatin (LIPITOR) 20 MG tablet          Last three blood pressure readings:  BP Readings from Last 3 Encounters:   05/22/18 118/80   05/18/18 135/79   05/14/18 110/66       Date of last diabetes office visit: 4/30/18     Tobacco History:     History   Smoking Status     Former Smoker     Types: Cigarettes     Quit date: 8/3/1997   Smokeless Tobacco     Never Used           Composite cancer screening  Chart review shows that this patient is due/due soon for the following Colonoscopy  Summary:    Patient is due/failing the following:   Colonoscopy    Action needed:   None needed at this time. Her colonoscopy was postponed due to her recent cancer treatment. She was advised to come for labs and f/u appt in September.     Type of outreach:    None needed.     Questions for provider review:    None                                                                                                                                     Lea Gilman CMA       Chart not routed.

## 2018-08-22 ENCOUNTER — TRANSFERRED RECORDS (OUTPATIENT)
Dept: HEALTH INFORMATION MANAGEMENT | Facility: CLINIC | Age: 58
End: 2018-08-22

## 2018-08-31 ENCOUNTER — TRANSFERRED RECORDS (OUTPATIENT)
Dept: HEALTH INFORMATION MANAGEMENT | Facility: CLINIC | Age: 58
End: 2018-08-31

## 2018-09-25 ENCOUNTER — HEALTH MAINTENANCE LETTER (OUTPATIENT)
Age: 58
End: 2018-09-25

## 2018-10-23 ENCOUNTER — HEALTH MAINTENANCE LETTER (OUTPATIENT)
Age: 58
End: 2018-10-23

## 2018-12-31 ENCOUNTER — TELEPHONE (OUTPATIENT)
Dept: OBGYN | Facility: CLINIC | Age: 58
End: 2018-12-31

## 2018-12-31 NOTE — TELEPHONE ENCOUNTER
Pt is past due for Pap follow up  Reminder letter has been sent  LMTC her clinic with any questions or to schedule    Zhane Lane,   Pap Tracking

## 2019-01-12 DIAGNOSIS — E11.9 TYPE 2 DIABETES MELLITUS WITHOUT COMPLICATION, WITHOUT LONG-TERM CURRENT USE OF INSULIN (H): ICD-10-CM

## 2019-01-14 ENCOUNTER — TRANSFERRED RECORDS (OUTPATIENT)
Dept: HEALTH INFORMATION MANAGEMENT | Facility: CLINIC | Age: 59
End: 2019-01-14

## 2019-01-14 NOTE — TELEPHONE ENCOUNTER
"Requested Prescriptions   Pending Prescriptions Disp Refills     metFORMIN (GLUCOPHAGE) 1000 MG tablet [Pharmacy Med Name: METFORMIN 1000MG TAB] 180 tablet 0    Last Written Prescription Date:  04/30/2018  Last Fill Quantity: 180,  # refills: 0   Last office visit: 5/14/2018 with prescribing provider:     Future Office Visit:   Sig: TAKE 1 TABLET (1000 MG) BY MOUTH TWICE DAILY WITH MEALS    Biguanide Agents Failed - 1/12/2019  5:29 PM       Failed - Blood pressure less than 140/90 in past 6 months    BP Readings from Last 3 Encounters:   05/22/18 118/80   05/18/18 135/79   05/14/18 110/66                Failed - Patient has documented LDL within the past 12 mos.    Recent Labs   Lab Test 08/11/17  0832   LDL 87            Failed - Patient has documented A1c within the specified period of time.    If HgbA1C is 8 or greater, it needs to be on file within the past 3 months.  If less than 8, must be on file within the past 6 months.     Recent Labs   Lab Test 04/23/18  0835   A1C 7.2*            Failed - Recent (6 mo) or future (30 days) visit within the authorizing provider's specialty    Patient had office visit in the last 6 months or has a visit in the next 30 days with authorizing provider or within the authorizing provider's specialty.  See \"Patient Info\" tab in inbasket, or \"Choose Columns\" in Meds & Orders section of the refill encounter.           Passed - Patient has had a Microalbumin in the past 15 mos.    Recent Labs   Lab Test 04/23/18  0835   MICROL 14   UMALCR 11.72            Passed - Patient is age 10 or older       Passed - Patient's CR is NOT>1.4 OR Patient's EGFR is NOT<45 within past 12 mos.    Recent Labs   Lab Test 05/18/18  0649   GFRESTIMATED >90   GFRESTBLACK >90       Recent Labs   Lab Test 05/18/18  0649   CR 0.57            Passed - Patient does NOT have a diagnosis of CHF.       Passed - Medication is active on med list       Passed - Patient is not pregnant       Passed - Patient has not " had a positive pregnancy test within the past 12 mos.

## 2019-01-15 NOTE — TELEPHONE ENCOUNTER
Due for 6 month diabetic office visit and labs in November. Left message for patient to call back.

## 2019-01-17 NOTE — TELEPHONE ENCOUNTER
Patient has scheduled appt for 1/28/19.  Prescription approved x1 per [facility] Refill Protocol. KIMMIE Nelson R.N.

## 2019-01-20 PROBLEM — R87.612 PAPANICOLAOU SMEAR OF CERVIX WITH LOW GRADE SQUAMOUS INTRAEPITHELIAL LESION (LGSIL): Status: ACTIVE | Noted: 2017-08-18

## 2019-01-22 DIAGNOSIS — E11.9 TYPE 2 DIABETES MELLITUS WITHOUT COMPLICATION, WITHOUT LONG-TERM CURRENT USE OF INSULIN (H): ICD-10-CM

## 2019-01-22 LAB — HBA1C MFR BLD: 8.2 % (ref 0–5.6)

## 2019-01-22 PROCEDURE — 83036 HEMOGLOBIN GLYCOSYLATED A1C: CPT | Performed by: INTERNAL MEDICINE

## 2019-01-22 PROCEDURE — 36415 COLL VENOUS BLD VENIPUNCTURE: CPT | Performed by: INTERNAL MEDICINE

## 2019-01-28 ENCOUNTER — OFFICE VISIT (OUTPATIENT)
Dept: INTERNAL MEDICINE | Facility: CLINIC | Age: 59
End: 2019-01-28
Payer: OTHER GOVERNMENT

## 2019-01-28 VITALS
HEART RATE: 111 BPM | WEIGHT: 166.7 LBS | BODY MASS INDEX: 26.79 KG/M2 | OXYGEN SATURATION: 95 % | SYSTOLIC BLOOD PRESSURE: 124 MMHG | TEMPERATURE: 97.3 F | DIASTOLIC BLOOD PRESSURE: 78 MMHG | HEIGHT: 66 IN

## 2019-01-28 DIAGNOSIS — I10 BENIGN ESSENTIAL HYPERTENSION: ICD-10-CM

## 2019-01-28 DIAGNOSIS — Z17.0 MALIGNANT NEOPLASM OF CENTRAL PORTION OF RIGHT BREAST IN FEMALE, ESTROGEN RECEPTOR POSITIVE (H): ICD-10-CM

## 2019-01-28 DIAGNOSIS — E78.5 HYPERLIPIDEMIA LDL GOAL <100: ICD-10-CM

## 2019-01-28 DIAGNOSIS — E11.9 TYPE 2 DIABETES MELLITUS WITHOUT COMPLICATION, WITHOUT LONG-TERM CURRENT USE OF INSULIN (H): Primary | ICD-10-CM

## 2019-01-28 DIAGNOSIS — C50.111 MALIGNANT NEOPLASM OF CENTRAL PORTION OF RIGHT BREAST IN FEMALE, ESTROGEN RECEPTOR POSITIVE (H): ICD-10-CM

## 2019-01-28 DIAGNOSIS — Z23 NEED FOR PROPHYLACTIC VACCINATION AND INOCULATION AGAINST INFLUENZA: ICD-10-CM

## 2019-01-28 DIAGNOSIS — Z12.11 SPECIAL SCREENING FOR MALIGNANT NEOPLASMS, COLON: ICD-10-CM

## 2019-01-28 PROCEDURE — 90471 IMMUNIZATION ADMIN: CPT | Performed by: INTERNAL MEDICINE

## 2019-01-28 PROCEDURE — 90686 IIV4 VACC NO PRSV 0.5 ML IM: CPT | Performed by: INTERNAL MEDICINE

## 2019-01-28 PROCEDURE — 99214 OFFICE O/P EST MOD 30 MIN: CPT | Mod: 25 | Performed by: INTERNAL MEDICINE

## 2019-01-28 RX ORDER — LISINOPRIL 10 MG/1
10 TABLET ORAL DAILY
Qty: 90 TABLET | Refills: 0 | Status: SHIPPED | OUTPATIENT
Start: 2019-01-28 | End: 2019-05-23

## 2019-01-28 RX ORDER — ATORVASTATIN CALCIUM 20 MG/1
20 TABLET, FILM COATED ORAL DAILY
Qty: 90 TABLET | Refills: 0 | Status: SHIPPED | OUTPATIENT
Start: 2019-01-28 | End: 2019-05-23

## 2019-01-28 ASSESSMENT — MIFFLIN-ST. JEOR: SCORE: 1355.15

## 2019-01-28 NOTE — PATIENT INSTRUCTIONS
Plan:  1. Januvia 100 mg daily - for diabetes.    If it is not covered, patient needs to call insurance and find out which equivalent is covered.    2. Continue Metformin   3. Colonoscopy -   Appt. Line [phone] with GI   [facility]. [facility]  5. Please make an appointment few days after the labs to discuss about the results. And annual exam   6. Flu vaccine

## 2019-01-28 NOTE — PROGRESS NOTES
Dr Salazar's note      Patient's instructions / PLAN:                                                        Plan:  1. Januvia 100 mg daily - for diabetes.    If it is not covered, patient needs to call insurance and find out which equivalent is covered.    2. Continue Metformin   3. Colonoscopy -   Appt. Line [phone] with GI   4. [facility] a [facility]  [facility]  5. Please make an appointment few days after the labs to discuss about the results. And annual exam   6. Flu vaccine          ASSESSMENT & PLAN:                                                      (E11.9) Type 2 diabetes mellitus without complication, without long-term current use of insulin (H)  (primary encounter diagnosis)  Comment: Not controlled   Plan: sitagliptin (JANUVIA) 100 MG tablet,         Comprehensive metabolic panel, CBC with         platelets, Lipid panel reflex to direct LDL         Fasting, Albumin Random Urine Quantitative with        Creat Ratio, TSH with free T4 reflex, metFORMIN        (GLUCOPHAGE) 1000 MG tablet, lisinopril         (PRINIVIL/ZESTRIL) 10 MG tablet            (E78.5) Hyperlipidemia LDL goal <100  Comment:   Plan: Comprehensive metabolic panel, CBC with         platelets, Lipid panel reflex to direct LDL         Fasting, Albumin Random Urine Quantitative with        Creat Ratio, TSH with free T4 reflex,         atorvastatin (LIPITOR) 20 MG tablet            (C50.111,  Z17.0) Malignant neoplasm of central portion of right breast in female, estrogen receptor positive (H)  Comment: stable , s/p chemo  Plan: f/u w onco     (Z12.11) Special screening for malignant neoplasms, colon  Comment:   Plan: GASTROENTEROLOGY ADULT REF PROCEDURE ONLY            (I10) Benign essential hypertension  Comment: Controlled    Plan: lisinopril (PRINIVIL/ZESTRIL) 10 MG tablet            (Z23) Need for prophylactic vaccination and inoculation against influenza  Comment:   Plan: FLU VACCINE, SPLIT VIRUS,  "IM (QUADRIVALENT)         [77526]- >3 YRS, Vaccine Administration,         Initial [59497]               Chief complaint:                                                      Follow up chronic medical problems          SUBJECTIVE:   Cheryl Patel is a 58 year old female who presents to clinic today for the following health issues:    Stopped the Glipizide because it was making very hungry and she was eating all the time.   She admits that she has not follow-up diabetic diet recently    Diabetes Follow-up      Patient is checking blood sugars: not at all    Diabetic concerns: None     Symptoms of hypoglycemia (low blood sugar): none     Paresthesias (numbness or burning in feet) or sores: No     Date of last diabetic eye exam: Dec 2018    BP Readings from Last 2 Encounters:   05/22/18 118/80   05/18/18 135/79     Hemoglobin A1C (%)   Date Value   01/22/2019 8.2 (H)   04/23/2018 7.2 (H)     LDL Cholesterol Calculated (mg/dL)   Date Value   08/11/2017 87       Diabetes Management Resources  Hyperlipidemia Follow-Up      Rate your low fat/cholesterol diet?: poor    Taking statin?  Yes, no muscle aches from statin    Other lipid medications/supplements?:  none      Amount of exercise or physical activity: work, walking around, lifting    Problems taking medications regularly: No    Medication side effects: none    Diet: regular (no restrictions)        Review of Systems:                                                      ROS: negative for fever, chills, cough, wheezes, chest pain, shortness of breath, vomiting, abdominal pain, leg swelling       OBJECTIVE:             Physical exam:   Blood pressure 124/78, pulse 111, temperature 97.3  F (36.3  C), temperature source Oral, height 1.672 m (5' 5.83\"), weight 75.6 kg (166 lb 11.2 oz), last menstrual period 07/18/2017, SpO2 95 %, not currently breastfeeding.     NAD, appears comfortable  Skin: no rashes   HEENT: PERRLA, EOMI, pink conjunctiva, anicteric sclerae, " bilateral tympanic membranes are clinically normal, oropharynx is normal color  Neck: supple, no JVD,  No thyroidmegaly. Lymph nodes nonpalpable cervical and supraclavicular.  Chest: clear to auscultation bilaterally, good respiratory effort  Heart: S1 S2, RRR, no mgr appreciated  Abdomen: soft, not tender,   Extremities: no edema, clubbing, cyanosis. Palpable pedal pulses bilaterally, Monofilament skin sensation is intact, no feet skin lesions   Neurologic: A, Ox3, no focal signs appreciated    PMHx: reviewed  Past Medical History:   Diagnosis Date     Cancer (H)      Diabetes (H)      History of blood transfusion      Hypertension      Papanicolaou smear of cervix with low grade squamous intraepithelial lesion (LGSIL) 8/18/2017 8/18/17 LSIL/Neg HPV. Plan: cotest in 1 year      PSHx: reviewed  Past Surgical History:   Procedure Laterality Date     BIOPSY       BREAST SURGERY       DILATION AND CURETTAGE, OPERATIVE HYSTEROSCOPY, COMBINED N/A 5/18/2018    Procedure: COMBINED DILATION AND CURETTAGE, OPERATIVE HYSTEROSCOPY;  Hysteroscopy, Dilation and Curettage;  Surgeon: Lashawn Hernandez MD;  Location: [facility] OR     GYN SURGERY       MASTECTOMY          Meds: reviewed  Current Outpatient Medications   Medication Sig Dispense Refill     atorvastatin (LIPITOR) 20 MG tablet Take 1 tablet (20 mg) by mouth daily 90 tablet 1     lisinopril (PRINIVIL/ZESTRIL) 10 MG tablet Take 1 tablet (10 mg) by mouth daily 90 tablet 1     metFORMIN (GLUCOPHAGE) 1000 MG tablet TAKE 1 TABLET (1000 MG) BY MOUTH TWICE DAILY WITH MEALS 60 tablet 0     TAMOXIFEN CITRATE PO Take by mouth daily       Venlafaxine HCl (EFFEXOR PO) Take 50 mg by mouth daily       glipiZIDE (GLUCOTROL XL) 2.5 MG 24 hr tablet Take 1 tablet (2.5 mg) by mouth daily (Patient not taking: Reported on 1/28/2019) 90 tablet 1       Soc Hx: reviewed  Fam Hx: reviewed          Archana Salazar MD  Internal Medicine

## 2019-01-28 NOTE — PROGRESS NOTES
Injectable Influenza Immunization Documentation    1.  Is the person to be vaccinated sick today?   No    2. Does the person to be vaccinated have an allergy to a component   of the vaccine?   No  Egg Allergy Algorithm Link    3. Has the person to be vaccinated ever had a serious reaction   to influenza vaccine in the past?   No    4. Has the person to be vaccinated ever had Guillain-Barré syndrome?   No    Form completed by Archana Bustamante MA on 1/28/2019 at 1:30 PM

## 2019-02-18 DIAGNOSIS — E78.5 HYPERLIPIDEMIA LDL GOAL <100: ICD-10-CM

## 2019-02-18 DIAGNOSIS — E11.9 TYPE 2 DIABETES MELLITUS WITHOUT COMPLICATION, WITHOUT LONG-TERM CURRENT USE OF INSULIN (H): ICD-10-CM

## 2019-02-18 LAB
ALBUMIN SERPL-MCNC: 3.5 G/DL (ref 3.4–5)
ALP SERPL-CCNC: 62 U/L (ref 40–150)
ALT SERPL W P-5'-P-CCNC: 27 U/L (ref 0–50)
ANION GAP SERPL CALCULATED.3IONS-SCNC: 5 MMOL/L (ref 3–14)
AST SERPL W P-5'-P-CCNC: 20 U/L (ref 0–45)
BILIRUB SERPL-MCNC: 0.2 MG/DL (ref 0.2–1.3)
BUN SERPL-MCNC: 13 MG/DL (ref 7–30)
CALCIUM SERPL-MCNC: 9 MG/DL (ref 8.5–10.1)
CHLORIDE SERPL-SCNC: 112 MMOL/L (ref 94–109)
CHOLEST SERPL-MCNC: 164 MG/DL
CO2 SERPL-SCNC: 23 MMOL/L (ref 20–32)
CREAT SERPL-MCNC: 0.6 MG/DL (ref 0.52–1.04)
CREAT UR-MCNC: 128 MG/DL
ERYTHROCYTE [DISTWIDTH] IN BLOOD BY AUTOMATED COUNT: 12.6 % (ref 10–15)
GFR SERPL CREATININE-BSD FRML MDRD: >90 ML/MIN/{1.73_M2}
GLUCOSE SERPL-MCNC: 161 MG/DL (ref 70–99)
HCT VFR BLD AUTO: 39.3 % (ref 35–47)
HDLC SERPL-MCNC: 54 MG/DL
HGB BLD-MCNC: 12.8 G/DL (ref 11.7–15.7)
LDLC SERPL CALC-MCNC: 89 MG/DL
MCH RBC QN AUTO: 29.9 PG (ref 26.5–33)
MCHC RBC AUTO-ENTMCNC: 32.6 G/DL (ref 31.5–36.5)
MCV RBC AUTO: 92 FL (ref 78–100)
MICROALBUMIN UR-MCNC: 17 MG/L
MICROALBUMIN/CREAT UR: 13.52 MG/G CR (ref 0–25)
NONHDLC SERPL-MCNC: 110 MG/DL
PLATELET # BLD AUTO: 186 10E9/L (ref 150–450)
POTASSIUM SERPL-SCNC: 4.2 MMOL/L (ref 3.4–5.3)
PROT SERPL-MCNC: 6.2 G/DL (ref 6.8–8.8)
RBC # BLD AUTO: 4.28 10E12/L (ref 3.8–5.2)
SODIUM SERPL-SCNC: 140 MMOL/L (ref 133–144)
TRIGL SERPL-MCNC: 107 MG/DL
TSH SERPL DL<=0.005 MIU/L-ACNC: 1.17 MU/L (ref 0.4–4)
WBC # BLD AUTO: 3.4 10E9/L (ref 4–11)

## 2019-02-18 PROCEDURE — 36415 COLL VENOUS BLD VENIPUNCTURE: CPT | Performed by: INTERNAL MEDICINE

## 2019-02-18 PROCEDURE — 84443 ASSAY THYROID STIM HORMONE: CPT | Performed by: INTERNAL MEDICINE

## 2019-02-18 PROCEDURE — 80061 LIPID PANEL: CPT | Performed by: INTERNAL MEDICINE

## 2019-02-18 PROCEDURE — 82043 UR ALBUMIN QUANTITATIVE: CPT | Performed by: INTERNAL MEDICINE

## 2019-02-18 PROCEDURE — 80053 COMPREHEN METABOLIC PANEL: CPT | Performed by: INTERNAL MEDICINE

## 2019-02-18 PROCEDURE — 85027 COMPLETE CBC AUTOMATED: CPT | Performed by: INTERNAL MEDICINE

## 2019-02-25 ENCOUNTER — OFFICE VISIT (OUTPATIENT)
Dept: INTERNAL MEDICINE | Facility: CLINIC | Age: 59
End: 2019-02-25
Payer: OTHER GOVERNMENT

## 2019-02-25 VITALS
OXYGEN SATURATION: 100 % | DIASTOLIC BLOOD PRESSURE: 65 MMHG | HEIGHT: 66 IN | TEMPERATURE: 98.1 F | HEART RATE: 90 BPM | WEIGHT: 165.1 LBS | SYSTOLIC BLOOD PRESSURE: 108 MMHG | RESPIRATION RATE: 16 BRPM | BODY MASS INDEX: 26.53 KG/M2

## 2019-02-25 DIAGNOSIS — L40.9 PSORIASIS: ICD-10-CM

## 2019-02-25 DIAGNOSIS — E11.9 TYPE 2 DIABETES MELLITUS WITHOUT COMPLICATION, WITHOUT LONG-TERM CURRENT USE OF INSULIN (H): ICD-10-CM

## 2019-02-25 DIAGNOSIS — C50.111 MALIGNANT NEOPLASM OF CENTRAL PORTION OF RIGHT BREAST IN FEMALE, ESTROGEN RECEPTOR POSITIVE (H): ICD-10-CM

## 2019-02-25 DIAGNOSIS — E78.5 HYPERLIPIDEMIA LDL GOAL <100: ICD-10-CM

## 2019-02-25 DIAGNOSIS — I10 HTN, GOAL BELOW 150/90: ICD-10-CM

## 2019-02-25 DIAGNOSIS — R87.612 PAPANICOLAOU SMEAR OF CERVIX WITH LOW GRADE SQUAMOUS INTRAEPITHELIAL LESION (LGSIL): ICD-10-CM

## 2019-02-25 DIAGNOSIS — Z17.0 MALIGNANT NEOPLASM OF CENTRAL PORTION OF RIGHT BREAST IN FEMALE, ESTROGEN RECEPTOR POSITIVE (H): ICD-10-CM

## 2019-02-25 DIAGNOSIS — Z00.00 ROUTINE GENERAL MEDICAL EXAMINATION AT A HEALTH CARE FACILITY: Primary | ICD-10-CM

## 2019-02-25 PROCEDURE — 99396 PREV VISIT EST AGE 40-64: CPT | Performed by: INTERNAL MEDICINE

## 2019-02-25 PROCEDURE — G0145 SCR C/V CYTO,THINLAYER,RESCR: HCPCS | Performed by: INTERNAL MEDICINE

## 2019-02-25 PROCEDURE — 99214 OFFICE O/P EST MOD 30 MIN: CPT | Mod: 25 | Performed by: INTERNAL MEDICINE

## 2019-02-25 PROCEDURE — G0476 HPV COMBO ASSAY CA SCREEN: HCPCS | Performed by: INTERNAL MEDICINE

## 2019-02-25 ASSESSMENT — PATIENT HEALTH QUESTIONNAIRE - PHQ9
SUM OF ALL RESPONSES TO PHQ QUESTIONS 1-9: 3
10. IF YOU CHECKED OFF ANY PROBLEMS, HOW DIFFICULT HAVE THESE PROBLEMS MADE IT FOR YOU TO DO YOUR WORK, TAKE CARE OF THINGS AT HOME, OR GET ALONG WITH OTHER PEOPLE: SOMEWHAT DIFFICULT
SUM OF ALL RESPONSES TO PHQ QUESTIONS 1-9: 3

## 2019-02-25 ASSESSMENT — ENCOUNTER SYMPTOMS
CHILLS: 0
COUGH: 0
CONSTIPATION: 0
HEMATOCHEZIA: 0
DIARRHEA: 0
HEMATURIA: 0
ABDOMINAL PAIN: 0
EYE PAIN: 0
DIZZINESS: 0

## 2019-02-25 ASSESSMENT — MIFFLIN-ST. JEOR: SCORE: 1347.89

## 2019-02-25 NOTE — PATIENT INSTRUCTIONS
Plan:  1. You will hold the Metformin for 3 days: the day before, the day of and the day after colonoscopy   2. Please make a lab appointment for non fasting labs first week in May   3. Please make an appointment few days after the labs to discuss about the results.   4. No changes in meds

## 2019-02-25 NOTE — LETTER
March 18, 2019    Cheryl Patel  [address]  [facility] [address]    Dear ,  This letter is regarding your recent Pap smear (cervical cancer screening) and Human Papillomavirus (HPV) test.  We are happy to inform you that your Pap smear result is normal. Cervical cancer is closely linked with certain types of HPV. Your results showed no evidence of high-risk HPV.  Therefore we recommend you return in 3 years for your next pap smear and HPV test.  You will still need to return to the clinic every year for an annual exam and other preventive tests.  If you have additional questions regarding this result, please call our registered nurse, Dennise at [phone].  Sincerely,    Archana Chen MD/[facility]

## 2019-02-25 NOTE — PROGRESS NOTES
Dr Salazar's note    Patient's instructions / PLAN:                                                        Plan:  1. You will hold the Metformin for 3 days: the day before, the day of and the day after colonoscopy   2. Please make a lab appointment for non fasting labs first week in May   3. Please make an appointment few days after the labs to discuss about the results.   4. No changes in meds        ASSESSMENT & PLAN:                                                      (Z00.00) Routine general medical examination at a health care facility  (primary encounter diagnosis)  Comment:   Plan:     (C50.111,  Z17.0) Malignant neoplasm of central portion of right breast in female, estrogen receptor positive (H)  Comment: stable   Plan: f/u with oncology     (E11.9) Type 2 diabetes mellitus without complication, without long-term current use of insulin (H)  Comment: better BS   Plan: Continue same meds, same doses for now     (L40.9) Psoriasis  Comment:   Plan: DERMATOLOGY REFERRAL, HPV High Risk Types DNA         Cervical            (I10) HTN, goal below 150/90  Comment: Controlled    Plan: Continue same meds, same doses for now     (R87.612) Papanicolaou smear of cervix with low grade squamous intraepithelial lesion (LGSIL)  Comment:   Plan: Pap imaged thin layer screen with HPV -         recommended age 30 - 65 years (select HPV order        below)            (E78.5) Hyperlipidemia LDL goal <100  Comment: Controlled    Plan: Continue same meds, same doses for now        Chief Complaint:                                                        Annual exam  Follow up chronic medical problems    DM, skin     SUBJECTIVE:                                                    History of present illness     DM  Tolerates Januvia. BS better    Psoriasis  -- persistent rash for years, getting worse   -- exam: Plaques psoriasis skin lesion on elbows, on the lower back and small lesions on both legs        ROS:    See below         PMHx: - reviewed  Past Medical History:   Diagnosis Date     Cancer (H)      Diabetes (H)      History of blood transfusion      Hypertension      Papanicolaou smear of cervix with low grade squamous intraepithelial lesion (LGSIL) 2017 LSIL/Neg HPV. Plan: cotest in 1 year       PSHx: reviewed  Past Surgical History:   Procedure Laterality Date     BIOPSY       BREAST SURGERY       DILATION AND CURETTAGE, OPERATIVE HYSTEROSCOPY, COMBINED N/A 2018    Procedure: COMBINED DILATION AND CURETTAGE, OPERATIVE HYSTEROSCOPY;  Hysteroscopy, Dilation and Curettage;  Surgeon: Lashawn Hernandez MD;  Location: [facility] OR     GYN SURGERY       MASTECTOMY          Soc Hx: No daily alcohol, no smoking  Social History     Socioeconomic History     Marital status:      Spouse name: Not on file     Number of children: Not on file     Years of education: Not on file     Highest education level: Not on file   Occupational History     Not on file   Social Needs     Financial resource strain: Not on file     Food insecurity:     Worry: Not on file     Inability: Not on file     Transportation needs:     Medical: Not on file     Non-medical: Not on file   Tobacco Use     Smoking status: Former Smoker     Types: Cigarettes     Last attempt to quit: 8/3/1997     Years since quittin.5     Smokeless tobacco: Never Used   Substance and Sexual Activity     Alcohol use: Yes     Comment: socially     Drug use: No     Sexual activity: Yes     Partners: Male   Lifestyle     Physical activity:     Days per week: Not on file     Minutes per session: Not on file     Stress: Not on file   Relationships     Social connections:     Talks on phone: Not on file     Gets together: Not on file     Attends Bahai service: Not on file     Active member of club or organization: Not on file     Attends meetings of clubs or organizations: Not on file     Relationship status: Not on file     Intimate partner violence:      "Fear of current or ex partner: Not on file     Emotionally abused: Not on file     Physically abused: Not on file     Forced sexual activity: Not on file   Other Topics Concern     Parent/sibling w/ CABG, MI or angioplasty before 65F 55M? Not Asked   Social History Narrative     Not on file        Fam Hx: reviewed  Family History   Problem Relation Age of Onset     Other - See Comments Mother         hysterectomy for benign     Diabetes Father          during bilateral leg amputation      Myocardial Infarction Maternal Grandmother 40     Kidney Disease Paternal Grandmother         On dialysis     Cardiac Sudden Death Paternal Grandfather 60         during angiogram     Diabetes Brother 40     Other - See Comments Sister         hysterectomy for benign     Breast Cancer No family hx of          Screening: reviewed      All: reviewed    Meds: reviewed  Current Outpatient Medications   Medication Sig Dispense Refill     atorvastatin (LIPITOR) 20 MG tablet Take 1 tablet (20 mg) by mouth daily 90 tablet 0     lisinopril (PRINIVIL/ZESTRIL) 10 MG tablet Take 1 tablet (10 mg) by mouth daily 90 tablet 0     metFORMIN (GLUCOPHAGE) 1000 MG tablet TAKE 1 TABLET (1000 MG) BY MOUTH TWICE DAILY WITH MEALS 180 tablet 0     sitagliptin (JANUVIA) 100 MG tablet Take 1 tablet (100 mg) by mouth daily 90 tablet 3     TAMOXIFEN CITRATE PO Take by mouth daily       Venlafaxine HCl (EFFEXOR PO) Take 50 mg by mouth daily         OBJECTIVE:                                                    Physical Exam :  Blood pressure 108/65, pulse 90, temperature 98.1  F (36.7  C), temperature source Oral, resp. rate 16, height 1.672 m (5' 5.83\"), weight 74.9 kg (165 lb 1.6 oz), last menstrual period 2017, SpO2 100 %, not currently breastfeeding.     NAD, appears comfortable  Skin clear, no rashes  HEENT: PERRLA, EOMI, anicteric sclera, pink conjunctiva, external ears appear normal, bilateral tympanic membranes clinically normal, oropharynx " normal color.  Neck: supple, no JVD,  no thyroidmegaly  Lymph nodes non palpable in the cervical, supraclavicular axillaries, inguinal areas  Chest: clear to auscultation with good respiratory effort  Cardiac: S1S2, RRR, no mgr appreciated  Abdomen: soft, not tender, not distended, audible bowel sound, no hepatosplenomegaly, no palpable masses, no abdominal bruits  Extremities: no cyanosis, clubbing or edema.   Neuro: A, Ox3, no focal signs.  Breast examL breast clinically normal Right breast mastectomy with no reconstruction    Pelvic exam: Normal external genitals, normal appearing perineum, normal appearing urethra,  vaginal mucosa pink, no discharge, Cervix appears normal, Pap smear obtained. On bimanual exam, I did not feel any uterus or ovarian masses, and she denies any tenderness.         Archana Salazar MD  Internal Medicine        SUBJECTIVE:   CC: Cheryl Patel is an 58 year old woman who presents for preventive health visit.     Physical   Annual:     Getting at least 3 servings of Calcium per day:  NO    Bi-annual eye exam:  Yes    Dental care twice a year:  NO    Sleep apnea or symptoms of sleep apnea:  None    Diet:  Diabetic    Frequency of exercise:  None    Taking medications regularly:  Yes    Additional concerns today:  Yes    PHQ-2 Total Score: 0              Today's PHQ-2 Score:   PHQ-2 (  [employer]) 2019   Q1: Little interest or pleasure in doing things 0   Q2: Feeling down, depressed or hopeless 0   PHQ-2 Score 0   Q1: Little interest or pleasure in doing things Not at all   Q2: Feeling down, depressed or hopeless Not at all   PHQ-2 Score 0       Abuse: Current or Past(Physical, Sexual or Emotional)- No  Do you feel safe in your environment? Yes    Social History     Tobacco Use     Smoking status: Former Smoker     Types: Cigarettes     Last attempt to quit: 8/3/1997     Years since quittin.5     Smokeless tobacco: Never Used   Substance Use Topics     Alcohol use: Yes      "Comment: socially     Alcohol Use 2/25/2019   If you drink alcohol do you typically have greater than 3 drinks per day OR greater than 7 drinks per week? No       Reviewed orders with patient.  Reviewed health maintenance and updated orders accordingly - Yes          Pertinent mammograms are reviewed under the imaging tab.  History of abnormal Pap smear:   PAP / HPV Latest Ref Rng & Units 8/18/2017   PAP - LSIL(A)   HPV 16 DNA NEG:Negative Negative   HPV 18 DNA NEG:Negative Negative   OTHER HR HPV NEG:Negative Negative     Reviewed and updated as needed this visit by clinical staff  Tobacco  Allergies  Meds  Med Hx  Surg Hx  Fam Hx  Soc Hx        Reviewed and updated as needed this visit by Provider            Review of Systems   Constitutional: Negative for chills.   HENT: Negative for congestion and ear pain.    Eyes: Negative for pain.   Respiratory: Negative for cough.    Cardiovascular: Negative for chest pain.   Gastrointestinal: Negative for abdominal pain, constipation, diarrhea and hematochezia.   Genitourinary: Negative for hematuria.   Neurological: Negative for dizziness.         COUNSELING:  Reviewed preventive health counseling, as reflected in patient instructions       Regular exercise       Healthy diet/nutrition    BP Readings from Last 1 Encounters:   01/28/19 124/78     Estimated body mass index is 27.05 kg/m  as calculated from the following:    Height as of 1/28/19: 1.672 m (5' 5.83\").    Weight as of 1/28/19: 75.6 kg (166 lb 11.2 oz).      Weight management plan: Discussed healthy diet and exercise guidelines     reports that she quit smoking about 21 years ago. Her smoking use included cigarettes. she has never used smokeless tobacco.      Counseling Resources:  ATP IV Guidelines  Pooled Cohorts Equation Calculator  Breast Cancer Risk Calculator  FRAX Risk Assessment  ICSI Preventive Guidelines  Dietary Guidelines for Americans, 2010  USDA's MyPlate  ASA Prophylaxis  Lung CA " Screening    Archana Chen MD  [facility]  Answers for HPI/ROS submitted by the patient on 2/25/2019   Annual Exam:  If you checked off any problems, how difficult have these problems made it for you to do your work, take care of things at home, or get along with other people?: Somewhat difficult  PHQ9 TOTAL SCORE: 3

## 2019-02-28 LAB
COPATH REPORT: NORMAL
PAP: NORMAL

## 2019-03-01 LAB
FINAL DIAGNOSIS: NORMAL
HPV HR 12 DNA CVX QL NAA+PROBE: NEGATIVE
HPV16 DNA SPEC QL NAA+PROBE: NEGATIVE
HPV18 DNA SPEC QL NAA+PROBE: NEGATIVE
SPECIMEN DESCRIPTION: NORMAL
SPECIMEN SOURCE CVX/VAG CYTO: NORMAL

## 2019-03-02 PROBLEM — I10 HTN, GOAL BELOW 150/90: Status: ACTIVE | Noted: 2019-03-02

## 2019-03-11 ENCOUNTER — HOSPITAL ENCOUNTER (OUTPATIENT)
Facility: CLINIC | Age: 59
Discharge: HOME | End: 2019-03-11
Attending: INTERNAL MEDICINE | Admitting: INTERNAL MEDICINE
Payer: OTHER GOVERNMENT

## 2019-03-11 VITALS
HEART RATE: 87 BPM | RESPIRATION RATE: 16 BRPM | SYSTOLIC BLOOD PRESSURE: 125 MMHG | DIASTOLIC BLOOD PRESSURE: 77 MMHG | OXYGEN SATURATION: 95 %

## 2019-03-11 LAB
COLONOSCOPY: NORMAL
GLUCOSE BLDC GLUCOMTR-MCNC: 154 MG/DL (ref 70–99)

## 2019-03-11 PROCEDURE — 82962 GLUCOSE BLOOD TEST: CPT

## 2019-03-11 PROCEDURE — G0121 COLON CA SCRN NOT HI RSK IND: HCPCS | Performed by: INTERNAL MEDICINE

## 2019-03-11 PROCEDURE — 25000128 H RX IP 250 OP 636: Performed by: INTERNAL MEDICINE

## 2019-03-11 PROCEDURE — G0500 MOD SEDAT ENDO SERVICE >5YRS: HCPCS | Performed by: INTERNAL MEDICINE

## 2019-03-11 PROCEDURE — 45378 DIAGNOSTIC COLONOSCOPY: CPT | Performed by: INTERNAL MEDICINE

## 2019-03-11 RX ORDER — ONDANSETRON 2 MG/ML
4 INJECTION INTRAMUSCULAR; INTRAVENOUS EVERY 6 HOURS PRN
Status: DISCONTINUED | OUTPATIENT
Start: 2019-03-11 | End: 2019-03-11 | Stop reason: HOSPADM

## 2019-03-11 RX ORDER — LIDOCAINE 40 MG/G
CREAM TOPICAL
Status: DISCONTINUED | OUTPATIENT
Start: 2019-03-11 | End: 2019-03-11 | Stop reason: HOSPADM

## 2019-03-11 RX ORDER — FLUMAZENIL 0.1 MG/ML
0.2 INJECTION, SOLUTION INTRAVENOUS
Status: DISCONTINUED | OUTPATIENT
Start: 2019-03-11 | End: 2019-03-11 | Stop reason: HOSPADM

## 2019-03-11 RX ORDER — FENTANYL CITRATE 50 UG/ML
INJECTION, SOLUTION INTRAMUSCULAR; INTRAVENOUS PRN
Status: DISCONTINUED | OUTPATIENT
Start: 2019-03-11 | End: 2019-03-11 | Stop reason: HOSPADM

## 2019-03-11 RX ORDER — ONDANSETRON 4 MG/1
4 TABLET, ORALLY DISINTEGRATING ORAL EVERY 6 HOURS PRN
Status: DISCONTINUED | OUTPATIENT
Start: 2019-03-11 | End: 2019-03-11 | Stop reason: HOSPADM

## 2019-03-11 RX ORDER — NALOXONE HYDROCHLORIDE 0.4 MG/ML
.1-.4 INJECTION, SOLUTION INTRAMUSCULAR; INTRAVENOUS; SUBCUTANEOUS
Status: DISCONTINUED | OUTPATIENT
Start: 2019-03-11 | End: 2019-03-11 | Stop reason: HOSPADM

## 2019-03-11 RX ORDER — ONDANSETRON 2 MG/ML
4 INJECTION INTRAMUSCULAR; INTRAVENOUS
Status: DISCONTINUED | OUTPATIENT
Start: 2019-03-11 | End: 2019-03-11 | Stop reason: HOSPADM

## 2019-03-11 NOTE — H&P
Pre-Endoscopy History and Physical     Cheryl Patel MRN# [id number]   YOB: 1960 Age: 58 year old     Date of Procedure: 3/11/2019  Primary care provider: Archana Chen  Type of Endoscopy: Colonoscopy with possible biopsy, possible polypectomy  Reason for Procedure: screen  Type of Anesthesia Anticipated: Conscious Sedation    HPI:    Cheryl is a 58 year old female who will be undergoing the above procedure.      A history and physical has been performed. The patient's medications and allergies have been reviewed. The risks and benefits of the procedure and the sedation options and risks were discussed with the patient.  All questions were answered and informed consent was obtained.      She denies a personal or family history of anesthesia complications or bleeding disorders.     Patient Active Problem List   Diagnosis     Malignant neoplasm of central portion of right breast in female, estrogen receptor positive (H)     Advanced directives, counseling/discussion     Papanicolaou smear of cervix with low grade squamous intraepithelial lesion (LGSIL)     Type 2 diabetes mellitus without complication, without long-term current use of insulin (H)     Hyperlipidemia LDL goal <100     Recurrent major depressive disorder, in partial remission (H)     HTN, goal below 150/90     Breast cancer (H)        Past Medical History:   Diagnosis Date     Breast cancer (H)      Cancer (H)      Diabetes (H)      History of blood transfusion      Hypertension      Papanicolaou smear of cervix with low grade squamous intraepithelial lesion (LGSIL) 8/18/2017 8/18/17 LSIL/Neg HPV. Plan: cotest in 1 year        Past Surgical History:   Procedure Laterality Date     BIOPSY       BREAST SURGERY       DILATION AND CURETTAGE, OPERATIVE HYSTEROSCOPY, COMBINED N/A 5/18/2018    Procedure: COMBINED DILATION AND CURETTAGE, OPERATIVE HYSTEROSCOPY;  Hysteroscopy, Dilation and Curettage;  Surgeon: Mary  Lashawn Greene MD;  Location: [facility] OR     GYN SURGERY       MASTECTOMY         Social History     Tobacco Use     Smoking status: Former Smoker     Types: Cigarettes     Last attempt to quit: 8/3/1997     Years since quittin.6     Smokeless tobacco: Never Used   Substance Use Topics     Alcohol use: Yes     Comment: socially       Family History   Problem Relation Age of Onset     Other - See Comments Mother         hysterectomy for benign     Diabetes Father          during bilateral leg amputation      Myocardial Infarction Maternal Grandmother 40     Kidney Disease Paternal Grandmother         On dialysis     Cardiac Sudden Death Paternal Grandfather 60         during angiogram     Diabetes Brother 40     Other - See Comments Sister         hysterectomy for benign     Breast Cancer No family hx of      Colon Cancer No family hx of        Prior to Admission medications    Medication Sig Start Date End Date Taking? Authorizing Provider   atorvastatin (LIPITOR) 20 MG tablet Take 1 tablet (20 mg) by mouth daily 19  Yes Archana Chen MD   lisinopril (PRINIVIL/ZESTRIL) 10 MG tablet Take 1 tablet (10 mg) by mouth daily 19  Yes Archana Chen MD   metFORMIN (GLUCOPHAGE) 1000 MG tablet TAKE 1 TABLET (1000 MG) BY MOUTH TWICE DAILY WITH MEALS 19  Yes Archana Chen MD   sitagliptin (JANUVIA) 100 MG tablet Take 1 tablet (100 mg) by mouth daily 19  Yes Archana Chen MD   TAMOXIFEN CITRATE PO Take by mouth daily   Yes Reported, Patient   Venlafaxine HCl (EFFEXOR PO) Take 50 mg by mouth daily   Yes Reported, Patient       No Known Allergies     REVIEW OF SYSTEMS:   5 point ROS negative except as noted above in HPI, including Gen., Resp., CV, GI &  system review.    PHYSICAL EXAM:   /90   Resp 16   LMP 2017   SpO2 96%  Estimated body mass index is 26.79 kg/m  as calculated from the following:    Height as of  "2/25/19: 1.672 m (5' 5.83\").    Weight as of 2/25/19: 74.9 kg (165 lb 1.6 oz).   GENERAL APPEARANCE: alert, and oriented  MENTAL STATUS: alert  AIRWAY EXAM: Mallampatti Class I (visualization of the soft palate, fauces, uvula, anterior and posterior pillars)  RESP: lungs clear to auscultation - no rales, rhonchi or wheezes  CV: regular rates and rhythm  DIAGNOSTICS:    Not indicated    IMPRESSION   ASA Class 2 - Mild systemic disease    PLAN:   Plan for Colonoscopy with possible biopsy, possible polypectomy. We discussed the risks, benefits and alternatives and the patient wished to proceed.    The above has been forwarded to the consulting provider.      Signed Electronically by: Nam Shah  March 11, 2019          "

## 2019-03-11 NOTE — LETTER
February 8, 2019      Cheryl Patel  [address] [place] [address]      Thank you for choosing [facility]. You are scheduled for the following service:     Date:  03/11/2019             Procedure:  COLONOSCOPY  Doctor:        Alyssa   Arrival Time:  12:30 PM  *Check in at Emergency/Endoscopy desk*  Procedure Time:  01:00 PM     Location:   [facility]        Endoscopy Department, First Floor (Enter through ER Doors) *        [address] [place], [place] [address]      [phone] or [phone] () to reschedule      MIRALAX -GATORADE  PREP  Colonoscopy is the most accurate test to detect colon polyps and colon cancer; and the only test where polyps can be removed. During this procedure, a doctor examines the lining of your large intestine and rectum through a flexible tube.   Transportation  Arrange for a ride for the day of your procedure with a responsible adult.  A taxi ride is not an option unless you are accompanied by a responsible adult. If you fail to arrange transportation with a responsible adult, your procedure will be cancelled and rescheduled.    Purchase the  following supplies at your local pharmacy:  - 2 (two) bisacodyl tablets: each tablet contains 5 mg.  (Dulcolax  laxative NOT Dulcolax  stool softener)   - 1 (one) 8.3 oz bottle of Polyethylene Glycol (PEG) 3350 Powder   (MiraLAX , Smooth LAX , ClearLAX  or equivalent)  - 64 oz Gatorade    Regular Gatorade, Gatorade G2 , Powerade , Powerade Zero  or Pedialyte  is acceptable. Red colored flavors are not allowed; all other colors (yellow, green, orange, purple and blue) are okay. It is also okay to buy two 2.12 oz packets of powdered Gatorade that can be mixed with water to a total volume of 64 oz of liquid.  - 1 (one) 10 oz bottle of Magnesium Citrate (Red colored flavors are not allowed)  It is also okay for you to use a 0.5 oz package of powdered  magnesium citrate (17 g) mixed with 10 oz of water.      PREPARATION FOR COLONOSCOPY    7 days before:    Discontinue fiber supplements and medications containing iron. This includes Metamucil  and Fibercon ; and multivitamins with iron.    3 days before:    Begin a low-fiber diet. A low-fiber diet helps making the cleanout more effective.     Examples of a low-fiber diet include (but are not limited to): white bread, white rice, pasta, crackers, fish, chicken, eggs, ground beef, creamy peanut butter, cooked/steamed/boiled vegetables, canned fruit, bananas, melons, milk, plain yogurt cheese, salad dressing and other condiments.     The following are not allowed on a low-fiber diet: seeds, nuts, popcorn, bran, whole wheat, corn, quinoa, raw fruits and vegetables, berries and dried fruit, beans and lentils.    For additional details on low-fiber diet, please refer to the table on the last page.    2 days before:    Continue the low-fiber diet.     Drink at least 8 glasses of water throughout the day.     Stop eating solid foods at 11:45 pm.    1 day before:    In the morning: begin a clear liquid diet (liquids you can see through).     Examples of a clear liquid diet include: water, clear broth or bouillon, Gatorade, Pedialyte or Powerade, carbonated and non-carbonated soft drinks (Sprite , 7-Up , ginger ale), strained fruit juices without pulp (apple, white grape, white cranberry), Jell-O  and popsicles.     The following are not allowed on a clear liquid diet: red liquids, alcoholic beverages, dairy products (milk, creamer, and yogurt), protein shakes, creamy broths, juice with pulp and chewing tobacco.    At noon: take 2 (two) bisacodyl tablets     At 4 (and no later than 6pm): start drinking the Miralax-Gatorade preparation (8.3 oz of Miralax mixed with 64 oz of Gatorade in a large pitcher). Drink 1(one) 8 oz glass every 15 minutes thereafter, until the mixture is gone.    COLON CLEANSING TIPS: drink adequate  amounts of fluids before and after your colon cleansing to prevent dehydration. Stay near a toilet because you will have diarrhea. Even if you are sitting on the toilet, continue to drink the cleansing solution every 15 minutes. If you feel nauseous or vomit, rinse your mouth with water, take a 15 to 30-minute-break and then continue drinking the solution. You will be uncomfortable until the stool has flushed from your colon (in about 2 to 4 hours). You may feel chilled.    Day of your procedure  You may take all of your morning medications including blood pressure medications, blood thinners (if you have not been instructed to stop these by our office), methadone, anti-seizure medications with sips of water 3 hours prior to your procedure or earlier. Do not take insulin or vitamins prior to your procedure. Continue the clear liquid diet.       4 hours prior: drink 10 oz of magnesium citrate. It may be easier to drink it with a straw.    STOP consuming all liquids after that.     Do not take anything by mouth during this time.     Allow extra time to travel to your procedure as you may need to stop and use a restroom along the way.    You are ready for the procedure, if you followed all instructions and your stool is no longer formed, but clear or yellow liquid. If you are unsure whether your colon is clean, please call our office at [phone] before you leave for your appointment.    Bring the following to your procedure:  - Insurance Card/Photo ID.   - List of current medications including over-the-counter medications and supplements.   - Your rescue inhaler if you currently use one to control asthma.      Canceling or rescheduling your appointment:   If you must cancel or reschedule your appointment, please call [phone] as soon as possible.      COLONOSCOPY PRE-PROCEDURE CHECKLIST    If you have diabetes, ask your regular doctor for diet and medication restrictions.  If you take an anticoagulant or  anti-platelet medication (such as Coumadin , Lovenox , Pradaxa , Xarelto , Eliquis , etc.), please call your primary doctor for advice on holding this medication.  If you take aspirin you may continue to do so.  If you are or may be pregnant, please discuss the risks and benefits of this procedure with your doctor.        What happens during a colonoscopy?    Plan to spend up to two hours, starting at registration time, at the endoscopy center the day of your procedure. The colonoscopy takes an average of 15 to 30 minutes. Recovery time is about 30 minutes.      Before the exam:    You will change into a gown.    Your medical history and medication list will be reviewed with you, unless that has been done over the phone prior to the procedure.     A nurse will insert an intravenous (IV) line into your hand or arm.    The doctor will meet with you and will give you a consent form to sign.  During the exam:     Medicine will be given through the IV line to help you relax.     Your heart rate and oxygen levels will be monitored. If your blood pressure is low, you may be given fluids through the IV line.     The doctor will insert a flexible hollow tube, called a colonoscope, into your rectum. The scope will be advanced slowly through the large intestine (colon).    You may have a feeling of fullness or pressure.     If an abnormal tissue or a polyp is found, the doctor may remove it through the endoscope for closer examination, or biopsy. Tissue removal is painless    After the exam:           Any tissue samples removed during the exam will be sent to a lab for evaluation. It may take 5-7 working days for you to be notified of the results.     A nurse will provide you with complete discharge instructions before you leave the endoscopy center. Be sure to ask the nurse for specific instructions if you take blood thinners such as Aspirin, Coumadin or Plavix.     The doctor will prepare a full report for you and for the  physician who referred you for the procedure.     Your doctor will talk with you about the initial results of your exam.      Medication given during the exam will prohibit you from driving for the rest of the day.     Following the exam, you may resume your normal diet. Your first meal should be light, no greasy foods. Avoid alcohol until the next day.     You may resume your regular activities the day after the procedure.         LOW-FIBER DIET    Foods RECOMMENDED Foods to AVOID   Breads, Cereal, Rice and Pasta:   White bread, rolls, biscuits, croissant and mushtaq toast.   Waffles, Brazilian toast and pancakes.   White rice, noodles, pasta, macaroni and peeled cooked potatoes.   Plain crackers and saltines.   Cooked cereals: farina, cream of rice.   Cold cereals: Puffed Rice , Rice Krispies , Corn Flakes  and Special K    Breads, Cereal, Rice and Pasta:   Breads or rolls with nuts, seeds or fruit.   Whole wheat, pumpernickel, rye breads and cornbread.   Potatoes with skin, brown or wild rice, and kasha (buckwheat).     Vegetables:   Tender cooked and canned vegetables without seeds: carrots, asparagus tips, green or wax beans, pumpkin, spinach, lima beans. Vegetables:   Raw or steamed vegetables.   Vegetables with seeds.   Sauerkraut.   Winter squash, peas, broccoli, Brussel sprouts, cabbage, onions, cauliflower, baked beans, peas and corn.   Fruits:   Strained fruit juice.   Canned fruit, except pineapple.   Ripe bananas and melon. Fruits:   Prunes and prune juice.   Raw fruits.   Dried fruits: figs, dates and raisins.   Milk/Dairy:   Milk: plain or flavored.   Yogurt, custard and ice cream.   Cheese and cottage cheese Milk/Dairy:     Meat and other proteins:   ground, well-cooked tender beef, lamb, ham, veal, pork, fish, poultry and organ meats.   Eggs.   Peanut butter without nuts. Meat and other proteins:   Tough, fibrous meats with gristle.   Dry beans, peas and lentils.   Peanut butter with nuts.   Tofu.    Fats, Snack, Sweets, Condiments and Beverages:   Margarine, butter, oils, mayonnaise, sour cream and salad dressing, plain gravy.   Sugar, hard candy, clear jelly, honey and syrup.   Spices, cooked herbs, bouillon, broth and soups made with allowed vegetable, ketchup and mustard.   Coffee, tea and carbonated drinks.   Plain cakes, cookies and pretzels.   Gelatin, plain puddings, custard, ice cream, sherbet and popsicles. Fats, Snack, Sweets, Condiments and Beverages:   Nuts, seeds and coconut.   Jam, marmalade and preserves.   Pickles, olives, relish and horseradish.   All desserts containing nuts, seeds, dried fruit and coconut; or made from whole grains or bran.   Candy made with nuts or seeds.   Popcorn.                     DIRECTIONS TO THE ENDOSCOPY DEPARTMENT     From the north ([facility])  Take [address] South, exit on [address] [address]. Get into the left hand annabel, turn left (east), go one-half mile to [address] and turn left. Go north to the first stoplight, take a right on [address] and follow it to the Emergency entrance.    From the south ([facility])  Take 35N to the [address]E split and exit on [address] [address]. On [address] [address], turn left (west) to [address]. Turn right (north) on [address]. Go north to the first stoplight, take a right on [address] and follow it to the Emergency entrance.    From the east via 35E ([facility])  Take [address]E south to [address] [address] exit. Turn right on [address] . Go west to [address]. Turn right (north) on [address]. Go to the first stoplight, take a right and follow on [address] to the Emergency entrance.    From the east via [address] ([facility])  Take [address] to [address]. Turn left (south) on [address] to [address]. Turn left (east) on [address] and follow it to the Emergency entrance.    From the west via [address] ([place])  Take [address] east  to [address]. Turn right (south) on [address] to [address]. Turn left (east) on [address] and follow it to the Emergency entrance.

## 2019-03-11 NOTE — DISCHARGE INSTRUCTIONS
The patient has received a copy of the [place]  report the doctor has written and discharge instructions have been discussed with the patient and responsible adult.  All questions were addressed and answered prior to patient discharge.    Eating a High-Fiber Diet  Fiber is what gives strength and structure to plants. Most grains, beans, vegetables, and fruits contain fiber. Foods rich in fiber are often low in calories and fat, and they fill you up more. They may also reduce your risks for certain health problems. To find out the amount of fiber in canned, packaged, or frozen foods, read the  Nutrition Facts  label. It tells you how much fiber is in a serving.      Types of Fiber and Their Benefits  There are two types of fiber: insoluble and soluble. They both aid digestion and help you maintain a healthy weight.  Insoluble fiber: This is found in whole grains, cereals, certain fruits and vegetables (such as apple skin, corn, and carrots). Insoluble fiber may prevent constipation and reduce the risk of certain types of cancer.   Soluble fiber: This type of fiber is in oats, beans, and certain fruits and vegetables (such as strawberries and peas). Soluble fiber can reduce cholesterol (which may help lower the risk of heart disease), and helps control blood sugar levels.  Look for High-Fiber Foods  Whole-grain breads and cereals: Try to eat 6-8 ounces a day. Include wheat and oat bran cereals, whole-wheat muffins or toast, and corn tortillas in your meals.  Fruits: Try to eat 2 cups a day. Apples, oranges, strawberries, pears, and bananas are good sources. (Note: Fruit juice is low in fiber.)  Vegetables: Try to eat 3 cups a day. Add asparagus, carrots, broccoli, peas, and corn to your meals.  Legumes (beans): One cup of cooked lentils gives you over 15 grams of fiber. Try navy beans, lentils, and chickpeas.  Seeds:  A small handful of seeds gives you about 3 grams of fiber. Try sunflower seeds.    Keep Track of  Your Fiber  A healthy diet includes 31 grams of fiber a day if you have a 2,000-calorie diet. Keep track of how much fiber you eat. Start by reading food labels. Then eat a variety of foods high in fiber. Ask your doctor about supplemental fiber products.            1247-7578 [employer], [address] [place] [address]. All rights reserved. This information is not intended as a substitute for professional medical care. Always follow your healthcare professional's instructions.    Understanding Diverticulosis and Diverticulitis     Pouches or diverticula usually occur in the lower part of the colon called the sigmoid.      Diverticulitis occurs when the pouches become inflamed.     The colon (large intestine) is the last part of the digestive tract. It absorbs water from stool and changes it from a liquid to a solid. In certain cases, small pouches called diverticula can form in the colon wall. This condition is called diverticulosis. The pouches can become infected. If this happens, it becomes a more serious problem called diverticulitis. These problems can be painful. But they can be managed.   Managing Your Condition  Diet changes or taking medications are often tried first. These may be enough to bring relief. If the case is bad, surgery may be done. You and your doctor can discuss the plan that is best for you.  If You Have Diverticulosis  Diet changes are often enough to control symptoms. The main changes are adding fiber (roughage) and drinking more water. Fiber absorbs water as it travels through your colon. This helps your stool stay soft and move smoothly. Water helps this process. If needed, you may be told to take over-the-counter stool softeners. To help relieve pain, antispasmodic medications may be prescribed.  If You Have Diverticulitis  Treatment depends on how bad your symptoms are.  For mild symptoms: You may be put on a liquid diet for a short time. You may also be prescribed  antibiotics. If these two steps relieve your symptoms, you may then be prescribed a high-fiber diet. If you still have symptoms, your doctor will discuss further treatment options with you.  For severe symptoms: You may need to be admitted to the hospital. There, you can be given IV antibiotics and fluids. Once symptoms are under control, the above treatments may be tried. If these don t control your condition, your doctor may discuss the option of having surgery with you.  [place] to Colon Health  Help keep your colon healthy with a diet that includes plenty of high-fiber fruits, vegetables, and whole grains. Drink plenty of liquids like water and juice. Your doctor may also recommend avoiding seeds and nuts.          [id number] [facility], [address] [place] [address], [place] [address]. All rights reserved. This information is not intended as a substitute for professional medical care. Always follow your healthcare professional's instructions.

## 2019-03-14 ENCOUNTER — TRANSFERRED RECORDS (OUTPATIENT)
Dept: HEALTH INFORMATION MANAGEMENT | Facility: CLINIC | Age: 59
End: 2019-03-14

## 2019-03-15 ENCOUNTER — TELEPHONE (OUTPATIENT)
Dept: INTERNAL MEDICINE | Facility: CLINIC | Age: 59
End: 2019-03-15

## 2019-03-15 NOTE — TELEPHONE ENCOUNTER
"Dr. Chen - please advise.  Pap result notes copied and pasted below [2nd request for provider review]:    \"Notes recorded by Dennise Piper on 3/4/2019 at 11:03 AM CST  Dr. Chen,    Please review and advise on follow up due to tamoxifen use for dx of breast cancer  Current result = Dx NIL pap, neg HR HPV in 58 year old.    Pap Hx:  8/18/17 LSIL/Neg HPV. Plan: cotest in 1 year  2/25/19 NIL pap, neg HR HPV. Plan pending provider due to tamoxifen therapy    Would you like a 3 year cotest per ASCCP guidelines, or earlier follow up due to tamoxifen therapy?\"       Thanks!  LIBERTAD Khan, RN - Pap Tracking    "

## 2019-04-05 ENCOUNTER — HOSPITAL ENCOUNTER (OUTPATIENT)
Dept: MAMMOGRAPHY | Facility: CLINIC | Age: 59
Discharge: HOME | End: 2019-04-05
Attending: INTERNAL MEDICINE | Admitting: INTERNAL MEDICINE
Payer: OTHER GOVERNMENT

## 2019-04-05 ENCOUNTER — HOSPITAL ENCOUNTER (OUTPATIENT)
Dept: ULTRASOUND IMAGING | Facility: CLINIC | Age: 59
End: 2019-04-05
Attending: INTERNAL MEDICINE
Payer: OTHER GOVERNMENT

## 2019-04-05 DIAGNOSIS — C50.919 BREAST CANCER (H): ICD-10-CM

## 2019-04-05 DIAGNOSIS — Z12.39 BREAST CANCER SCREENING: ICD-10-CM

## 2019-04-05 PROCEDURE — 77067 SCR MAMMO BI INCL CAD: CPT | Mod: 52

## 2019-04-05 PROCEDURE — 76882 US LMTD JT/FCL EVL NVASC XTR: CPT | Mod: RT

## 2019-04-17 ENCOUNTER — TELEPHONE (OUTPATIENT)
Dept: INTERNAL MEDICINE | Facility: CLINIC | Age: 59
End: 2019-04-17

## 2019-04-17 DIAGNOSIS — E11.9 TYPE 2 DIABETES MELLITUS WITHOUT COMPLICATION, WITHOUT LONG-TERM CURRENT USE OF INSULIN (H): ICD-10-CM

## 2019-04-17 NOTE — TELEPHONE ENCOUNTER
Rx sent to [facility] for the remaining 9 months originally ordered by primary care provider.  KIMMIE Nelson R.N.

## 2019-04-17 NOTE — TELEPHONE ENCOUNTER
Reason for Call:  Medication or medication refill:    Do you use a [facility]? No      Name [facility] the [facility]     Name of the medication requested: [email or web address]     Other request: Because med is so expensive, insurance-- [facility]     Can we leave a detailed message on this number? YES    Phone number patient can be reached at: Home number on file [phone] (home)    Best Time: asap    Call taken on 4/17/2019 at 8:21 AM by Kim Greer

## 2019-04-30 ENCOUNTER — TELEPHONE (OUTPATIENT)
Dept: INTERNAL MEDICINE | Facility: CLINIC | Age: 59
End: 2019-04-30

## 2019-04-30 NOTE — TELEPHONE ENCOUNTER
Panel Management Review      Patient has the following on her problem list:     Depression / Dysthymia review    Measure:  Needs PHQ-9 score of 4 or less during index window.  Administer PHQ-9 and if score is 5 or more, send encounter to provider for next steps.    5 - 7 month window range: N/A    PHQ-9 SCORE 5/14/2018 2/25/2019 2/25/2019   PHQ-9 Total Score MyChart - - 3 (Minimal depression)   PHQ-9 Total Score 3 3 2       If PHQ-9 recheck is 5 or more, route to provider for next steps.    Patient is due for:  None    Diabetes    ASA: Failed    Last A1C  Lab Results   Component Value Date    A1C 8.2 01/22/2019    A1C 7.2 04/23/2018    A1C 7.5 01/08/2018    A1C 6.4 07/26/2017     A1C tested: Over goal, but possibly up-to-date?     Last LDL:    Lab Results   Component Value Date    CHOL 164 02/18/2019     Lab Results   Component Value Date    HDL 54 02/18/2019     Lab Results   Component Value Date    LDL 89 02/18/2019     Lab Results   Component Value Date    TRIG 107 02/18/2019     No results found for: CHOLHDLRATIO  Lab Results   Component Value Date    NHDL 110 02/18/2019       Is the patient on a Statin? YES           Is the patient on Aspirin? NO    Medications     HMG CoA Reductase Inhibitors     atorvastatin (LIPITOR) 20 MG tablet             Last three blood pressure readings:  BP Readings from Last 3 Encounters:   03/11/19 125/77   02/25/19 108/65   01/28/19 124/78       Date of last diabetes office visit: 2/25/19     Tobacco History:     History   Smoking Status     Former Smoker     Types: Cigarettes     Quit date: 8/3/1997   Smokeless Tobacco     Never Used         Hypertension   Last three blood pressure readings:  BP Readings from Last 3 Encounters:   03/11/19 125/77   02/25/19 108/65   01/28/19 124/78     Blood pressure: Passed    HTN Guidelines:  Less than 140/90      Composite cancer screening  Chart review shows that this patient is due/due soon for the following None  Summary:    Patient is  due/failing the following:   -A1c over goal  -Aspirin use not addressed    Action needed:   -She was advised to schedule lab appointment for the first week of May, and then an appointment with Dr. Salazar for a few days after. No appointments scheduled as of now.     Type of outreach:    Called and spoke to pt, future appointments scheduled for labs and Dr. Salazar.     Questions for provider review:    None. Dr. Salazar can address aspirin use when she comes for her appointment.                                                                                                                                     Lea Gilman [facility]       Chart not routed.

## 2019-05-01 ENCOUNTER — DOCUMENTATION ONLY (OUTPATIENT)
Dept: LAB | Facility: CLINIC | Age: 59
End: 2019-05-01

## 2019-05-01 DIAGNOSIS — E11.9 TYPE 2 DIABETES MELLITUS WITHOUT COMPLICATION, WITHOUT LONG-TERM CURRENT USE OF INSULIN (H): Primary | ICD-10-CM

## 2019-05-02 DIAGNOSIS — E11.9 TYPE 2 DIABETES MELLITUS WITHOUT COMPLICATION, WITHOUT LONG-TERM CURRENT USE OF INSULIN (H): ICD-10-CM

## 2019-05-02 LAB — HBA1C MFR BLD: 7.5 % (ref 0–5.6)

## 2019-05-02 PROCEDURE — 83036 HEMOGLOBIN GLYCOSYLATED A1C: CPT | Performed by: INTERNAL MEDICINE

## 2019-05-02 PROCEDURE — 36415 COLL VENOUS BLD VENIPUNCTURE: CPT | Performed by: INTERNAL MEDICINE

## 2019-05-02 PROCEDURE — 80048 BASIC METABOLIC PNL TOTAL CA: CPT | Performed by: INTERNAL MEDICINE

## 2019-05-03 LAB
ANION GAP SERPL CALCULATED.3IONS-SCNC: 10 MMOL/L (ref 3–14)
BUN SERPL-MCNC: 12 MG/DL (ref 7–30)
CALCIUM SERPL-MCNC: 9.1 MG/DL (ref 8.5–10.1)
CHLORIDE SERPL-SCNC: 107 MMOL/L (ref 94–109)
CO2 SERPL-SCNC: 23 MMOL/L (ref 20–32)
CREAT SERPL-MCNC: 0.63 MG/DL (ref 0.52–1.04)
GFR SERPL CREATININE-BSD FRML MDRD: >90 ML/MIN/{1.73_M2}
GLUCOSE SERPL-MCNC: 137 MG/DL (ref 70–99)
POTASSIUM SERPL-SCNC: 4.2 MMOL/L (ref 3.4–5.3)
SODIUM SERPL-SCNC: 140 MMOL/L (ref 133–144)

## 2019-05-23 ENCOUNTER — OFFICE VISIT (OUTPATIENT)
Dept: INTERNAL MEDICINE | Facility: CLINIC | Age: 59
End: 2019-05-23
Payer: OTHER GOVERNMENT

## 2019-05-23 VITALS
OXYGEN SATURATION: 97 % | BODY MASS INDEX: 26.28 KG/M2 | DIASTOLIC BLOOD PRESSURE: 76 MMHG | HEIGHT: 66 IN | SYSTOLIC BLOOD PRESSURE: 120 MMHG | HEART RATE: 100 BPM | RESPIRATION RATE: 16 BRPM | TEMPERATURE: 98 F | WEIGHT: 163.5 LBS

## 2019-05-23 DIAGNOSIS — E11.65 UNCONTROLLED TYPE 2 DIABETES MELLITUS WITH HYPERGLYCEMIA (H): Primary | ICD-10-CM

## 2019-05-23 DIAGNOSIS — E78.5 HYPERLIPIDEMIA LDL GOAL <100: ICD-10-CM

## 2019-05-23 DIAGNOSIS — F33.41 RECURRENT MAJOR DEPRESSIVE DISORDER, IN PARTIAL REMISSION (H): ICD-10-CM

## 2019-05-23 DIAGNOSIS — L40.9 PSORIASIS: ICD-10-CM

## 2019-05-23 DIAGNOSIS — I10 HTN, GOAL BELOW 140/90: ICD-10-CM

## 2019-05-23 PROCEDURE — 99214 OFFICE O/P EST MOD 30 MIN: CPT | Performed by: INTERNAL MEDICINE

## 2019-05-23 PROCEDURE — 99207 C FOOT EXAM  NO CHARGE: CPT | Performed by: INTERNAL MEDICINE

## 2019-05-23 RX ORDER — LISINOPRIL 10 MG/1
10 TABLET ORAL DAILY
Qty: 90 TABLET | Refills: 1 | Status: SHIPPED | OUTPATIENT
Start: 2019-05-23 | End: 2019-12-01

## 2019-05-23 RX ORDER — ATORVASTATIN CALCIUM 20 MG/1
20 TABLET, FILM COATED ORAL DAILY
Qty: 90 TABLET | Refills: 1 | Status: SHIPPED | OUTPATIENT
Start: 2019-05-23 | End: 2019-12-01

## 2019-05-23 ASSESSMENT — PATIENT HEALTH QUESTIONNAIRE - PHQ9
SUM OF ALL RESPONSES TO PHQ QUESTIONS 1-9: 3
SUM OF ALL RESPONSES TO PHQ QUESTIONS 1-9: 3

## 2019-05-23 ASSESSMENT — MIFFLIN-ST. JEOR: SCORE: 1343.38

## 2019-05-23 NOTE — PATIENT INSTRUCTIONS
Plan:  1. Continue same meds, same doses for now   2. Please make a lab appointment for non fasting labs  In September  3. Please make an appointment few days after the labs to discuss about the results.

## 2019-05-23 NOTE — PROGRESS NOTES
Dr Salazar's note      Patient's instructions / PLAN:                                                        Plan:  1. Continue same meds, same doses for now   2. Please make a lab appointment for non fasting labs  In September  3. Please make an appointment few days after the labs to discuss about the results.         ASSESSMENT & PLAN:                                                      (E11.65) DM 2, no insulin (H)  (primary encounter diagnosis)  Comment: A1c better but still > 7  Hx: Glipizide made her hungry all the time and she gained wt. She doesn't want to try it again   Plan:  -- she doesn't want to change meds at this time.  -- low carb diet, weight loss, exercise, Monitor labs    Meds: metformin, Januvia    Hemoglobin A1c, metFORMIN (GLUCOPHAGE) 1000 MG         tablet, lisinopril (PRINIVIL/ZESTRIL) 10 MG         tablet, atorvastatin (LIPITOR) 20 MG tablet            (I10) HTN, goal below 140/90  Comment: Controlled   Hx: Patient tries low salt diet, takes meds regularly with no side effects.   Plan: low salt diet, exercise  Meds: lisinopril   (PRINIVIL/ZESTRIL) 10 MG tablet            (E78.5) Hyperlipidemia LDL goal <100  Comment: Controlled   Hx: Patient tries low chol diet, takes meds regularly with no side effects.  Plan: low chol diet, exercise  Meds: atorvastatin (LIPITOR) 20 MG tablet            (F33.41) Recurrent major depressive disorder, in partial remission (H)  Comment: stable   Plan: Effexor     (L40.9) Psoriasis Dx 2019  Comment:   Plan: f/u w dermato. Med: Apremilast        Chief complaint:                                                      Follow up chronic medical problems      SUBJECTIVE:                                                    History of present illness:    She changed diet, watching the carb, not counting    A1c 7.5<--8.3    Glipizide made her hungry all the time     Diabetes Follow-up      How often are you checking your blood sugar? Not at all    What time of day are you  "checking your blood sugars (select all that apply)?  NA    Have you had any blood sugars above 200?  Not checking    Have you had any blood sugars below 70?  Not checking    What symptoms do you notice when your blood sugar is low?  Not checking    What concerns do you have today about your diabetes? None     Do you have any of these symptoms? (Select all that apply)  No numbness or tingling in feet.  No redness, sores or blisters on feet.  No complaints of excessive thirst.  No reports of blurry vision.  No significant changes to weight.     Have you had a diabetic eye exam in the last 12 months? Yes- Date of last eye exam: 2-    BP Readings from Last 2 Encounters:   05/23/19 120/76   03/11/19 125/77     Hemoglobin A1C (%)   Date Value   05/02/2019 7.5 (H)   01/22/2019 8.2 (H)     LDL Cholesterol Calculated (mg/dL)   Date Value   02/18/2019 89   08/11/2017 87       Diabetes Management Resources  Hypertension Follow-up      Do you check your blood pressure regularly outside of the clinic? No     Are you following a low salt diet? Yes    Are your blood pressures ever more than 140 on the top number (systolic) OR more   than 90 on the bottom number (diastolic), for example 140/90? No    Amount of exercise or physical activity: None    Problems taking medications regularly: No    Medication side effects: none    Diet: low salt and carbohydrate counting        Answers for HPI/ROS submitted by the patient on 5/23/2019   PHQ9 TOTAL SCORE: 3    Review of Systems:                                                      ROS: negative for fever, chills, cough, wheezes, chest pain, shortness of breath, vomiting, abdominal pain, leg swelling       OBJECTIVE:             Physical exam:  Blood pressure 120/76, pulse 100, temperature 98  F (36.7  C), temperature source Oral, resp. rate 16, height 1.676 m (5' 6\"), weight 74.2 kg (163 lb 8 oz), last menstrual period 07/18/2017, SpO2 97 %, not currently breastfeeding.   NAD, " appears comfortable  Skin: no rashes   Neck: supple, no JVD,  No thyroidmegaly. Lymph nodes nonpalpable cervical and supraclavicular.  Chest: clear to auscultation bilaterally, good respiratory effort  Heart: S1 S2, RRR, no mgr appreciated  Abdomen: soft, not tender,  Extremities: no edema, clubbing, cyanosis. Palpable pedal pulses bilaterally, Monofilament skin sensation is intact, no feet skin lesions   Neurologic: A, Ox3, no focal signs appreciated    PMHx: reviewed  Past Medical History:   Diagnosis Date     Breast cancer (H)      Cancer (H)      Diabetes (H)      History of blood transfusion      Hypertension      Papanicolaou smear of cervix with low grade squamous intraepithelial lesion (LGSIL) 8/18/2017 8/18/17 LSIL/Neg HPV. Plan: cotest in 1 year      PSHx: reviewed  Past Surgical History:   Procedure Laterality Date     BIOPSY       BREAST SURGERY       COLONOSCOPY N/A 3/11/2019    Procedure: COLONOSCOPY;  Surgeon: Nam Shah MD;  Location: [facility]     DILATION AND CURETTAGE, OPERATIVE HYSTEROSCOPY, COMBINED N/A 5/18/2018    Procedure: COMBINED DILATION AND CURETTAGE, OPERATIVE HYSTEROSCOPY;  Hysteroscopy, Dilation and Curettage;  Surgeon: Lashawn Hernandez MD;  Location: [facility] OR     GYN SURGERY       MASTECTOMY          Meds: reviewed  Current Outpatient Medications   Medication Sig Dispense Refill     apremilast (OTEZLA) 30 MG tablet Take 1 tablet (30 mg) by mouth 2 times daily       atorvastatin (LIPITOR) 20 MG tablet Take 1 tablet (20 mg) by mouth daily 90 tablet 0     lisinopril (PRINIVIL/ZESTRIL) 10 MG tablet Take 1 tablet (10 mg) by mouth daily 90 tablet 0     metFORMIN (GLUCOPHAGE) 1000 MG tablet TAKE 1 TABLET (1000 MG) BY MOUTH TWICE DAILY WITH MEALS 180 tablet 0     sitagliptin (JANUVIA) 100 MG tablet Take 1 tablet (100 mg) by mouth daily 90 tablet 2     TAMOXIFEN CITRATE PO Take by mouth daily       Venlafaxine HCl (EFFEXOR PO) Take 50 mg by mouth daily         Soc Hx: reviewed  Fam  Hx: reviewed          Archana Salazar MD  Internal Medicine

## 2019-05-24 ASSESSMENT — PATIENT HEALTH QUESTIONNAIRE - PHQ9: SUM OF ALL RESPONSES TO PHQ QUESTIONS 1-9: 3

## 2019-05-26 PROBLEM — E11.65 UNCONTROLLED TYPE 2 DIABETES MELLITUS WITH HYPERGLYCEMIA (H): Status: ACTIVE | Noted: 2019-05-26

## 2019-05-26 PROBLEM — L40.9 PSORIASIS: Status: ACTIVE | Noted: 2019-05-26

## 2019-05-26 NOTE — ASSESSMENT & PLAN NOTE
(E78.5) Hyperlipidemia LDL goal <100  Comment: Controlled   Hx: Patient tries low chol diet, takes meds regularly with no side effects.  Plan: low chol diet, exercise  Meds: atorvastatin

## 2019-05-26 NOTE — ASSESSMENT & PLAN NOTE
(I10) HTN, goal below 140/90  Comment: Controlled   Hx: Patient tries low salt diet, takes meds regularly with no side effects.   Plan: low salt diet, exercise  Meds: lisinopril

## 2019-05-26 NOTE — ASSESSMENT & PLAN NOTE
(F33.41) Recurrent major depressive disorder, in partial remission (H)  Comment: stable   Plan: Effexor

## 2019-05-26 NOTE — ASSESSMENT & PLAN NOTE
(E11.65) DM 2, no insulin (H)    Comment: A1c better but still > 7  Hx: Glipizide made her hungry all the time and she gained wt. She doesn't want to try it again   Plan:  -- she doesn't want to change meds at this time.  -- low carb diet, weight loss, exercise, Monitor labs    Meds: metformin, Januvia

## 2019-08-21 ENCOUNTER — TRANSFERRED RECORDS (OUTPATIENT)
Dept: HEALTH INFORMATION MANAGEMENT | Facility: CLINIC | Age: 59
End: 2019-08-21

## 2019-11-21 DIAGNOSIS — E11.9 TYPE 2 DIABETES MELLITUS WITHOUT COMPLICATION, WITHOUT LONG-TERM CURRENT USE OF INSULIN (H): ICD-10-CM

## 2019-11-21 RX ORDER — SITAGLIPTIN 100 MG/1
TABLET, FILM COATED ORAL
Qty: 90 TABLET | Refills: 0 | Status: SHIPPED | OUTPATIENT
Start: 2019-11-21 | End: 2020-01-02

## 2019-11-21 NOTE — TELEPHONE ENCOUNTER
"Requested Prescriptions   Pending Prescriptions Disp Refills     JENNIFERUVIA 100 MG tablet [Pharmacy Med Name: JANUVIA TABS 100MG] 90 tablet 4     Sig: TAKE 1 TABLET DAILY       DPP4 Inhibitors Protocol Failed - 11/21/2019  1:03 AM        Failed - Blood pressure less than 140/90 in past 6 months     BP Readings from Last 3 Encounters:   05/23/19 120/76   03/11/19 125/77   02/25/19 108/65                 Failed - HgbA1C in past 3 or 6 months     If HgbA1C is 8 or greater, it needs to be on file within the past 3 months.  If less than 8, must be on file within the past 6 months.     Recent Labs   Lab Test 05/02/19  1045   A1C 7.5*             Failed - Recent (6 mo) or future (30 days) visit within the authorizing provider's specialty     Patient had office visit in the last 6 months or has a visit in the next 30 days with authorizing provider.  See \"Patient Info\" tab in inbasket, or \"Choose Columns\" in Meds & Orders section of the refill encounter.            Passed - LDL on file in past 12 months     Recent Labs   Lab Test 02/18/19  0848   LDL 89             Passed - Microalbumin on file in past 12 months     Recent Labs   Lab Test 02/18/19  0848   MICROL 17   UMALCR 13.52             Passed - Medication is active on med list        Passed - Patient is age 18 or older        Passed - No active pregnancy on record        Passed - Normal serum creatinine in past 12 months     Recent Labs   Lab Test 05/02/19  1045   CR 0.63             Passed - No positive pregnancy test in past 12 months        Last Written Prescription Date:  4/17/19  Last Fill Quantity: 90,  # refills: 2   Last office visit: 5/23/2019 with prescribing provider:  5/23/19   Future Office Visit:      "

## 2019-11-21 NOTE — TELEPHONE ENCOUNTER
Medication is being filled for 1 time refill only due to:  Patient needs to be seen because diabetic follow up due.     Letter mailed.  Colette Peguero RN

## 2019-11-21 NOTE — LETTER
[place] [address]  [facility] [address]  Phone: [phone]        November 21, 2019      Cheryl Patel                                                                                                                                [address]  [facility] [address]            Dear Ms. Patel,    We are concerned about your health care.  We recently provided you with a medication refill.  Many medications require routine follow-up with your Doctor.      At this time we ask that: You schedule a routine office visit with your physician to follow your diabetes.    Your prescription: Has been refilled once so you may have time for the above noted follow-up.      Thank you,      Dr. Archana Salazar

## 2019-12-01 DIAGNOSIS — E78.5 HYPERLIPIDEMIA LDL GOAL <100: ICD-10-CM

## 2019-12-01 DIAGNOSIS — I10 HTN, GOAL BELOW 140/90: ICD-10-CM

## 2019-12-01 DIAGNOSIS — E11.65 UNCONTROLLED TYPE 2 DIABETES MELLITUS WITH HYPERGLYCEMIA (H): ICD-10-CM

## 2019-12-02 NOTE — TELEPHONE ENCOUNTER
"Requested Prescriptions   Pending Prescriptions Disp Refills     lisinopril (PRINIVIL/ZESTRIL) 10 MG tablet [Pharmacy Med Name: LISINOPRIL 10MG  TAB]  Last Written Prescription Date:  5/23/2019  Last Fill Quantity: 90,  # refills: 1   Last office visit: 5/23/2019 with prescribing provider:     Future Office Visit:   Next 5 appointments (look out 90 days)    Jan 02, 2020  1:00 PM CST  [facility] [address]  [facility] [address]  [phone]         0     Sig: TAKE 1 TABLET BY MOUTH ONCE DAILY       ACE Inhibitors (Including Combos) Protocol Passed - 12/1/2019 10:52 AM        Passed - Blood pressure under 140/90 in past 12 months     BP Readings from Last 3 Encounters:   05/23/19 120/76   03/11/19 125/77   02/25/19 108/65                 Passed - Recent (12 mo) or future (30 days) visit within the authorizing provider's specialty     Patient has had an office visit with the authorizing provider or a provider within the authorizing providers department within the previous 12 mos or has a future within next 30 days. See \"Patient Info\" tab in inbasket, or \"Choose Columns\" in Meds & Orders section of the refill encounter.              Passed - Medication is active on med list        Passed - Patient is age 18 or older        Passed - No active pregnancy on record        Passed - Normal serum creatinine on file in past 12 months     Recent Labs   Lab Test 05/02/19  1045   CR 0.63             Passed - Normal serum potassium on file in past 12 months     Recent Labs   Lab Test 05/02/19  1045   POTASSIUM 4.2             Passed - No positive pregnancy test within past 12 months        atorvastatin (LIPITOR) 20 MG tablet [Pharmacy Med Name: ATORVASTATIN 20MG   Last Written Prescription Date:  5/23/2019  Last Fill Quantity: 90,  # refills: 1   Last office visit: 5/23/2019 with prescribing provider:     Future Office " "Visit:   Next 5 appointments (look out 90 days)    Jan 02, 2020  1:00 PM CST  SHORT with Archana Chen MD  [facility] [address]  [facility] [address]  [phone]         TAB]  0     Sig: TAKE 1 TABLET BY MOUTH ONCE DAILY       Statins Protocol Passed - 12/1/2019 10:52 AM        Passed - LDL on file in past 12 months     Recent Labs   Lab Test 02/18/19  0848   LDL 89             Passed - No abnormal creatine kinase in past 12 months     No lab results found.             Passed - Recent (12 mo) or future (30 days) visit within the authorizing provider's specialty     Patient has had an office visit with the authorizing provider or a provider within the authorizing providers department within the previous 12 mos or has a future within next 30 days. See \"Patient Info\" tab in inbasket, or \"Choose Columns\" in Meds & Orders section of the refill encounter.              Passed - Medication is active on med list        Passed - Patient is age 18 or older        Passed - No active pregnancy on record        Passed - No positive pregnancy test in past 12 months        "

## 2019-12-03 RX ORDER — LISINOPRIL 10 MG/1
TABLET ORAL
Qty: 90 TABLET | Refills: 0 | Status: SHIPPED | OUTPATIENT
Start: 2019-12-03 | End: 2020-01-02

## 2019-12-03 RX ORDER — ATORVASTATIN CALCIUM 20 MG/1
TABLET, FILM COATED ORAL
Qty: 90 TABLET | Refills: 0 | Status: SHIPPED | OUTPATIENT
Start: 2019-12-03 | End: 2020-01-02

## 2019-12-03 NOTE — TELEPHONE ENCOUNTER
Per last office visit 5/23/19:    Plan:  1. Continue same meds, same doses for now   2. Please make a lab appointment for non fasting labs  In September  3. Please make an appointment few days after the labs to discuss about the results.      Patient has appointment scheduled     Next 5 appointments (look out 90 days)    Jan 02, 2020  1:00 PM CST  SHORT with Archana Chen MD  [facility] [address]  [facility] [address] [phone]        Call to patient. Lab appointment scheduled. Medication is being filled for 1 time refill only due to:  Future labs ordered .. Patient needs to be seen because ..

## 2019-12-04 DIAGNOSIS — E11.65 UNCONTROLLED TYPE 2 DIABETES MELLITUS WITH HYPERGLYCEMIA (H): ICD-10-CM

## 2019-12-04 LAB — HBA1C MFR BLD: 6.8 % (ref 0–5.6)

## 2019-12-04 PROCEDURE — 36415 COLL VENOUS BLD VENIPUNCTURE: CPT | Performed by: INTERNAL MEDICINE

## 2019-12-04 PROCEDURE — 83036 HEMOGLOBIN GLYCOSYLATED A1C: CPT | Performed by: INTERNAL MEDICINE

## 2020-01-02 ENCOUNTER — OFFICE VISIT (OUTPATIENT)
Dept: INTERNAL MEDICINE | Facility: CLINIC | Age: 60
End: 2020-01-02
Payer: OTHER GOVERNMENT

## 2020-01-02 VITALS
OXYGEN SATURATION: 100 % | BODY MASS INDEX: 24.57 KG/M2 | TEMPERATURE: 98.2 F | DIASTOLIC BLOOD PRESSURE: 64 MMHG | WEIGHT: 152.9 LBS | HEIGHT: 66 IN | SYSTOLIC BLOOD PRESSURE: 93 MMHG | RESPIRATION RATE: 16 BRPM | HEART RATE: 122 BPM

## 2020-01-02 DIAGNOSIS — E78.5 HYPERLIPIDEMIA LDL GOAL <100: ICD-10-CM

## 2020-01-02 DIAGNOSIS — I10 HTN, GOAL BELOW 140/90: ICD-10-CM

## 2020-01-02 DIAGNOSIS — E11.9 TYPE 2 DIABETES MELLITUS WITHOUT COMPLICATION, WITHOUT LONG-TERM CURRENT USE OF INSULIN (H): ICD-10-CM

## 2020-01-02 DIAGNOSIS — R63.4 WEIGHT LOSS, UNINTENTIONAL: ICD-10-CM

## 2020-01-02 DIAGNOSIS — Z17.0 MALIGNANT NEOPLASM OF CENTRAL PORTION OF RIGHT BREAST IN FEMALE, ESTROGEN RECEPTOR POSITIVE (H): Primary | ICD-10-CM

## 2020-01-02 DIAGNOSIS — Z23 NEED FOR INFLUENZA VACCINATION: ICD-10-CM

## 2020-01-02 DIAGNOSIS — C50.111 MALIGNANT NEOPLASM OF CENTRAL PORTION OF RIGHT BREAST IN FEMALE, ESTROGEN RECEPTOR POSITIVE (H): Primary | ICD-10-CM

## 2020-01-02 DIAGNOSIS — E11.65 UNCONTROLLED TYPE 2 DIABETES MELLITUS WITH HYPERGLYCEMIA (H): ICD-10-CM

## 2020-01-02 DIAGNOSIS — R07.1 CHEST PAIN ON BREATHING: ICD-10-CM

## 2020-01-02 PROCEDURE — 90471 IMMUNIZATION ADMIN: CPT | Performed by: INTERNAL MEDICINE

## 2020-01-02 PROCEDURE — 90682 RIV4 VACC RECOMBINANT DNA IM: CPT | Performed by: INTERNAL MEDICINE

## 2020-01-02 PROCEDURE — 99214 OFFICE O/P EST MOD 30 MIN: CPT | Mod: 25 | Performed by: INTERNAL MEDICINE

## 2020-01-02 RX ORDER — ATORVASTATIN CALCIUM 20 MG/1
20 TABLET, FILM COATED ORAL DAILY
Qty: 90 TABLET | Refills: 0 | Status: SHIPPED | OUTPATIENT
Start: 2020-01-02 | End: 2020-07-24

## 2020-01-02 RX ORDER — LISINOPRIL 10 MG/1
5 TABLET ORAL DAILY
Qty: 1 TABLET | Refills: 0
Start: 2020-01-02 | End: 2020-03-19

## 2020-01-02 ASSESSMENT — PATIENT HEALTH QUESTIONNAIRE - PHQ9
SUM OF ALL RESPONSES TO PHQ QUESTIONS 1-9: 2
10. IF YOU CHECKED OFF ANY PROBLEMS, HOW DIFFICULT HAVE THESE PROBLEMS MADE IT FOR YOU TO DO YOUR WORK, TAKE CARE OF THINGS AT HOME, OR GET ALONG WITH OTHER PEOPLE: SOMEWHAT DIFFICULT
SUM OF ALL RESPONSES TO PHQ QUESTIONS 1-9: 2

## 2020-01-02 ASSESSMENT — MIFFLIN-ST. JEOR: SCORE: 1287.6

## 2020-01-02 NOTE — NURSING NOTE
"BP 93/64 (BP Location: Left arm, Patient Position: Sitting, Cuff Size: Adult Regular)   Pulse 122   Temp 98.2  F (36.8  C) (Oral)   Resp 16   Ht 1.672 m (5' 5.83\")   Wt 69.4 kg (152 lb 14.4 oz)   LMP 07/18/2017   SpO2 100%   BMI 24.81 kg/m      "

## 2020-01-02 NOTE — PROGRESS NOTES
Dr Salazar's note      Patient's instructions / PLAN:                                                        Plan:  1. Decrease the Lisinopril to 5 mg daily   2. CT of the chest - abdomen - To schedule this test you may call Scheduling center at [phone]    3. Continue the other meds, same doses for now.  4. Please make a lab appointment for fasting labs in 3 months  5. Please make an appointment few days after the labs to discuss about the results and  ANNUAL EXAM    6. Make a follow up appointment with dr Pinzon after the CT      ASSESSMENT & PLAN:                                                      Cheryl is 59-year-old woman with medical problems significant for stage IV right breast cancer with status post mastectomy, radiation therapy and chemotherapy, diabetes, hypertension, hyperlipidemia.  She lost weight recently and the diabetes numbers and the blood pressure improved significantly.  She has paid more attention to diet, but she is concerned about the significant weight loss.  She also has some discomfort on the anterior chest.  Because of her history of stage IV lung cancer I recommended CT to rule out metastasis.    (C50.111,  Z17.0) Malignant neoplasm of central portion of right breast in female, estrogen receptor positive (H)  (primary encounter diagnosis)  Comment:   Plan: CT Chest Abdomen w/o & w Contrast            (I10) HTN, goal below 140/90  Comment: Blood pressure on the low side and she feels lightheaded  Plan: lisinopril (PRINIVIL/ZESTRIL) 10 MG tablet, CBC        with platelets, Comprehensive metabolic panel,         Lipid panel reflex to direct LDL Fasting,         Hemoglobin A1c, TSH with free T4 reflex,         Albumin Random Urine Quantitative with Creat         Ratio            (E11.65) DM 2, no insulin (H)  Comment:   Plan: lisinopril (PRINIVIL/ZESTRIL) 10 MG tablet, CBC        with platelets, Comprehensive metabolic panel,         Lipid panel reflex to direct LDL Fasting,          Hemoglobin A1c, TSH with free T4 reflex,         Albumin Random Urine Quantitative with Creat         Ratio, metFORMIN (GLUCOPHAGE) 1000 MG tablet,         atorvastatin (LIPITOR) 20 MG tablet            (E11.9) Type 2 diabetes mellitus without complication, without long-term current use of insulin (H)  Comment:   Plan: sitagliptin (JANUVIA) 100 MG tablet            (R63.4) Weight loss, unintentional  Comment:   Plan: CT Chest Abdomen w/o & w Contrast            (R07.1) Chest pain on breathing  Comment:   Plan: CT Chest Abdomen w/o & w Contrast            (E78.5) Hyperlipidemia LDL goal <100  Comment:   Plan: atorvastatin (LIPITOR) 20 MG tablet            (Z23) Need for influenza vaccination  Comment:   Plan: FLU VAC, QUADRIVALENT (RIV4) RECOMBINANT DNA,         IM               Chief complaint:                                                      Weight loss  Chest discomfort  Follow up chronic medical problems          SUBJECTIVE:                                                          Lost 10 lb since May, unintentionally.   Stress with  illness  She feels well otherwise     HTN 93/64 rechecked No symptoms    R ant chest pain when she takes a deep breath   This is the area she had mastectomy   Exam: tender  She had flu last weeks and she coughed a lot     Lab Results   Component Value Date    A1C 6.8 12/04/2019    A1C 7.5 05/02/2019    A1C 8.2 01/22/2019    A1C 7.2 04/23/2018    A1C 7.5 01/08/2018          Diabetes Follow-up      How often are you checking your blood sugar? Not at all    What concerns do you have today about your diabetes? None     Do you have any of these symptoms? (Select all that apply)  Weight loss     Have you had a diabetic eye exam in the last 12 months? No    Diabetes Management Resources    Hyperlipidemia Follow-Up      Are you regularly taking any medication or supplement to lower your cholesterol?   Yes- artrovastatin    Are you having muscle aches or other side effects that you  "think could be caused by your cholesterol lowering medication?  No    Hypertension Follow-up      Do you check your blood pressure regularly outside of the clinic? No     Are you following a low salt diet? Yes    Are your blood pressures ever more than 140 on the top number (systolic) OR more   than 90 on the bottom number (diastolic), for example 140/90? No    BP Readings from Last 2 Encounters:   05/23/19 120/76   03/11/19 125/77     Hemoglobin A1C (%)   Date Value   12/04/2019 6.8 (H)   05/02/2019 7.5 (H)     LDL Cholesterol Calculated (mg/dL)   Date Value   02/18/2019 89   08/11/2017 87         How many servings of fruits and vegetables do you eat daily?  0-1    On average, how many sweetened beverages do you drink each day (Examples: soda, juice, sweet tea, etc.  Do NOT count diet or artificially sweetened beverages)?   0    How many days per week do you miss taking your medication? 0  Answers for HPI/ROS submitted by the patient on 1/2/2020   If you checked off any problems, how difficult have these problems made it for you to do your work, take care of things at home, or get along with other people?: Somewhat difficult  PHQ9 TOTAL SCORE: 2      Review of Systems:                                                      ROS: negative for fever, chills, cough, wheezes,shortness of breath, vomiting, abdominal pain, leg swelling positive for chest pain, as above    A 10-point review of systems was obtained.  Those pertinent are above and in the in the Subjective section.  The rest of the systems are negative.           OBJECTIVE:             Physical exam:  Blood pressure 93/64, pulse 122, temperature 98.2  F (36.8  C), temperature source Oral, resp. rate 16, height 1.672 m (5' 5.83\"), weight 69.4 kg (152 lb 14.4 oz), last menstrual period 07/18/2017, SpO2 100 %, not currently breastfeeding.   NAD, appears comfortable  Skin: no rashes   HEENT: PERRLA, EOMI, pink conjunctiva, anicteric sclerae, bilateral tympanic " membranes are clinically normal, oropharynx is normal color  Neck: supple, no JVD,  No thyroidmegaly. Lymph nodes nonpalpable cervical and supraclavicular.  Chest: clear to auscultation bilaterally, good respiratory effort  Heart: S1 S2, RRR, no mgr appreciated  Abdomen: soft, not tender, no hepatosplenomegaly or masses appreciated, no abdominal bruit, present bowel sounds  Extremities: no edema,  Neurologic: A, Ox3, no focal signs appreciated    PMHx: reviewed  Past Medical History:   Diagnosis Date     Breast cancer (H)      Cancer (H)      Diabetes (H)      History of blood transfusion      Hypertension      Papanicolaou smear of cervix with low grade squamous intraepithelial lesion (LGSIL) 8/18/2017 8/18/17 LSIL/Neg HPV. Plan: cotest in 1 year      PSHx: reviewed  Past Surgical History:   Procedure Laterality Date     BIOPSY       BREAST SURGERY       COLONOSCOPY N/A 3/11/2019    Procedure: COLONOSCOPY;  Surgeon: Nam Shah MD;  Location:  GI     DILATION AND CURETTAGE, OPERATIVE HYSTEROSCOPY, COMBINED N/A 5/18/2018    Procedure: COMBINED DILATION AND CURETTAGE, OPERATIVE HYSTEROSCOPY;  Hysteroscopy, Dilation and Curettage;  Surgeon: Lashawn Hernandez MD;  Location: [facility] OR     GYN SURGERY       MASTECTOMY          Meds: reviewed  Current Outpatient Medications   Medication Sig Dispense Refill     apremilast (OTEZLA) 30 MG tablet Take 1 tablet (30 mg) by mouth 2 times daily       atorvastatin (LIPITOR) 20 MG tablet TAKE 1 TABLET BY MOUTH ONCE DAILY 90 tablet 0     JANUVIA 100 MG tablet TAKE 1 TABLET DAILY 90 tablet 0     lisinopril (PRINIVIL/ZESTRIL) 10 MG tablet TAKE 1 TABLET BY MOUTH ONCE DAILY 90 tablet 0     metFORMIN (GLUCOPHAGE) 1000 MG tablet TAKE 1 TABLET (1000 MG) BY MOUTH TWICE DAILY WITH MEALS 180 tablet 1     TAMOXIFEN CITRATE PO Take by mouth daily       Venlafaxine HCl (EFFEXOR PO) Take 50 mg by mouth daily         Soc Hx: reviewed  Fam Hx: reviewed          Archana Salazar  MD  Internal Medicine

## 2020-01-02 NOTE — PATIENT INSTRUCTIONS
Plan:  1. Decrease the Lisinopril to 5 mg daily   2. CT of the chest - abdomen - To schedule this test you may call Scheduling center at [phone]    3. Continue the other meds, same doses for now.  4. Please make a lab appointment for fasting labs in 3 months  5. Please make an appointment few days after the labs to discuss about the results and  ANNUAL EXAM    6. Make a follow up appointment with dr Pinzon after the CT

## 2020-01-09 ENCOUNTER — HOSPITAL ENCOUNTER (OUTPATIENT)
Dept: CT IMAGING | Facility: CLINIC | Age: 60
Discharge: HOME | End: 2020-01-09
Attending: INTERNAL MEDICINE | Admitting: INTERNAL MEDICINE
Payer: OTHER GOVERNMENT

## 2020-01-09 DIAGNOSIS — Z17.0 MALIGNANT NEOPLASM OF CENTRAL PORTION OF RIGHT BREAST IN FEMALE, ESTROGEN RECEPTOR POSITIVE (H): ICD-10-CM

## 2020-01-09 DIAGNOSIS — C50.111 MALIGNANT NEOPLASM OF CENTRAL PORTION OF RIGHT BREAST IN FEMALE, ESTROGEN RECEPTOR POSITIVE (H): ICD-10-CM

## 2020-01-09 DIAGNOSIS — R63.4 WEIGHT LOSS, UNINTENTIONAL: ICD-10-CM

## 2020-01-09 DIAGNOSIS — R07.1 CHEST PAIN ON BREATHING: ICD-10-CM

## 2020-01-09 LAB
CREAT BLD-MCNC: 0.6 MG/DL (ref 0.52–1.04)
GFR SERPL CREATININE-BSD FRML MDRD: >90 ML/MIN/{1.73_M2}

## 2020-01-09 PROCEDURE — 71260 CT THORAX DX C+: CPT

## 2020-01-09 PROCEDURE — 25000125 ZZHC RX 250: Performed by: INTERNAL MEDICINE

## 2020-01-09 PROCEDURE — 25000128 H RX IP 250 OP 636: Performed by: INTERNAL MEDICINE

## 2020-01-09 PROCEDURE — 82565 ASSAY OF CREATININE: CPT

## 2020-01-09 RX ORDER — IOPAMIDOL 755 MG/ML
500 INJECTION, SOLUTION INTRAVASCULAR ONCE
Status: COMPLETED | OUTPATIENT
Start: 2020-01-09 | End: 2020-01-09

## 2020-01-09 RX ADMIN — SODIUM CHLORIDE 59 ML: 9 INJECTION, SOLUTION INTRAVENOUS at 13:22

## 2020-01-09 RX ADMIN — IOPAMIDOL 75 ML: 755 INJECTION, SOLUTION INTRAVENOUS at 13:22

## 2020-01-10 ENCOUNTER — TELEPHONE (OUTPATIENT)
Dept: INTERNAL MEDICINE | Facility: CLINIC | Age: 60
End: 2020-01-10

## 2020-01-10 DIAGNOSIS — R16.0 LIVER MASS: Primary | ICD-10-CM

## 2020-01-10 NOTE — LETTER
[facility]  [address], [address]  [place] [address]  [phone]        January 10, 2020    Cherly Patel  [address] [facility] [address]  [facility] [address]            Dear MsJahaira Patel:    The recent CT shows a small liver mass.  The radiologist think it is benign, but he recommended liver MRI. To schedule this test you may call Scheduling center at [phone]      Sincerely,    Archana Salazar MD  Internal Medicine

## 2020-01-10 NOTE — TELEPHONE ENCOUNTER
Please call the patient:    The recent CT shows a small liver mass.  The radiologist think it is benign, but he recommended liver MRI. To schedule this test you may call Scheduling center at [phone]        Please send letter( look for it in the letter section) + results

## 2020-01-14 ENCOUNTER — HOSPITAL ENCOUNTER (OUTPATIENT)
Dept: MRI IMAGING | Facility: CLINIC | Age: 60
Discharge: HOME | End: 2020-01-14
Attending: INTERNAL MEDICINE | Admitting: INTERNAL MEDICINE
Payer: OTHER GOVERNMENT

## 2020-01-14 DIAGNOSIS — R16.0 LIVER MASS: ICD-10-CM

## 2020-01-14 PROCEDURE — 74183 MRI ABD W/O CNTR FLWD CNTR: CPT

## 2020-01-14 PROCEDURE — A9585 GADOBUTROL INJECTION: HCPCS | Performed by: INTERNAL MEDICINE

## 2020-01-14 PROCEDURE — 25500064 ZZH RX 255 OP 636: Performed by: INTERNAL MEDICINE

## 2020-01-14 RX ORDER — GADOBUTROL 604.72 MG/ML
7.5 INJECTION INTRAVENOUS ONCE
Status: COMPLETED | OUTPATIENT
Start: 2020-01-14 | End: 2020-01-14

## 2020-01-14 RX ADMIN — GADOBUTROL 7 ML: 604.72 INJECTION INTRAVENOUS at 10:57

## 2020-02-02 DIAGNOSIS — E11.65 UNCONTROLLED TYPE 2 DIABETES MELLITUS WITH HYPERGLYCEMIA (H): ICD-10-CM

## 2020-02-03 NOTE — TELEPHONE ENCOUNTER
"Requested Prescriptions   Pending Prescriptions Disp Refills     JENNIFERUVIA 100 MG tablet [Pharmacy Med Name: JANUVIA TABS 100MG] 90 tablet 4     Sig: TAKE 1 TABLET DAILY (APPOINTMENT DUE FOR ADDITIONAL REFILLS)   Last Written Prescription Date:  01/02/2020  Last Fill Quantity: 90,  # refills: 0   Last office visit: 1/2/2020 with prescribing provider:     Future Office Visit:      DPP4 Inhibitors Protocol Passed - 2/2/2020  5:55 AM        Passed - Blood pressure less than 140/90 in past 6 months     BP Readings from Last 3 Encounters:   01/02/20 93/64   05/23/19 120/76   03/11/19 125/77                 Passed - LDL on file in past 12 months     Recent Labs   Lab Test 02/18/19  0848   LDL 89             Passed - Microalbumin on file in past 12 months     Recent Labs   Lab Test 02/18/19  0848   MICROL 17   UMALCR 13.52             Passed - HgbA1C in past 3 or 6 months     If HgbA1C is 8 or greater, it needs to be on file within the past 3 months.  If less than 8, must be on file within the past 6 months.     Recent Labs   Lab Test 12/04/19  1300   A1C 6.8*             Passed - Medication is active on med list        Passed - Patient is age 18 or older        Passed - No active pregnancy on record        Passed - Normal serum creatinine in past 12 months     Recent Labs   Lab Test 01/09/20  1313 05/02/19  1045   CR  --  0.63   CREAT 0.6  --              Passed - No positive pregnancy test in past 12 months        Passed - Recent (6 mo) or future (30 days) visit within the authorizing provider's specialty     Patient had office visit in the last 6 months or has a visit in the next 30 days with authorizing provider.  See \"Patient Info\" tab in inbasket, or \"Choose Columns\" in Meds & Orders section of the refill encounter.            "

## 2020-02-04 NOTE — TELEPHONE ENCOUNTER
Patient requesting refills from 1/2/20 be sent to another pharmacy.    Prescription approved per [facility].

## 2020-03-09 ENCOUNTER — TRANSFERRED RECORDS (OUTPATIENT)
Dept: HEALTH INFORMATION MANAGEMENT | Facility: CLINIC | Age: 60
End: 2020-03-09

## 2020-03-16 DIAGNOSIS — I10 HTN, GOAL BELOW 140/90: ICD-10-CM

## 2020-03-16 DIAGNOSIS — E11.65 UNCONTROLLED TYPE 2 DIABETES MELLITUS WITH HYPERGLYCEMIA (H): ICD-10-CM

## 2020-03-16 NOTE — TELEPHONE ENCOUNTER
"Requested Prescriptions   Pending Prescriptions Disp Refills     lisinopril (ZESTRIL) 10 MG tablet [Pharmacy Med Name: Lisinopril 10 MG Oral Tablet] 90 tablet 0     Sig: Take 1 tablet by mouth once daily   Last Written Prescription Date:  1/2/20  Last Fill Quantity: 1,  # refills: 0   Last office visit: 1/2/2020 with prescribing provider:     Future Office Visit:        ACE Inhibitors (Including Combos) Protocol Passed - 3/16/2020  4:27 PM        Passed - Blood pressure under 140/90 in past 12 months     BP Readings from Last 3 Encounters:   01/02/20 93/64   05/23/19 120/76   03/11/19 125/77                 Passed - Recent (12 mo) or future (30 days) visit within the authorizing provider's specialty     Patient has had an office visit with the authorizing provider or a provider within the authorizing providers department within the previous 12 mos or has a future within next 30 days. See \"Patient Info\" tab in inbasket, or \"Choose Columns\" in Meds & Orders section of the refill encounter.              Passed - Medication is active on med list        Passed - Patient is age 18 or older        Passed - No active pregnancy on record        Passed - Normal serum creatinine on file in past 12 months     Recent Labs   Lab Test 01/09/20  1313 05/02/19  1045   CR  --  0.63   CREAT 0.6  --        Ok to refill medication if creatinine is low          Passed - Normal serum potassium on file in past 12 months     Recent Labs   Lab Test 05/02/19  1045   POTASSIUM 4.2             Passed - No positive pregnancy test within past 12 months           Tee Braun , [facility]  03/16/20 5:18 PM     "

## 2020-03-19 RX ORDER — LISINOPRIL 10 MG/1
5 TABLET ORAL DAILY
Qty: 90 TABLET | Refills: 0 | Status: SHIPPED | OUTPATIENT
Start: 2020-03-19 | End: 2020-05-05

## 2020-03-19 NOTE — TELEPHONE ENCOUNTER
"Per Dr. Salazar's 1/2/2020 office visit note:   \"1. Decrease the Lisinopril to 5 mg daily   2. CT of the chest - abdomen - To schedule this test you may call Scheduling center at [phone]    3. Continue the other meds, same doses for now.  4. Please make a lab appointment for fasting labs in 3 months  5. Please make an appointment few days after the labs to discuss about the results and  ANNUAL EXAM\"    Routing refill request to provider for review/approval because:  Due for follow-up appointment. Please advise if patient should schedule appointment or okay to fill.       "

## 2020-05-04 DIAGNOSIS — I10 HTN, GOAL BELOW 140/90: ICD-10-CM

## 2020-05-04 DIAGNOSIS — E11.65 UNCONTROLLED TYPE 2 DIABETES MELLITUS WITH HYPERGLYCEMIA (H): ICD-10-CM

## 2020-05-05 RX ORDER — LISINOPRIL 10 MG/1
TABLET ORAL
Qty: 90 TABLET | Refills: 0 | Status: SHIPPED | OUTPATIENT
Start: 2020-05-05 | End: 2020-10-01

## 2020-07-22 DIAGNOSIS — E78.5 HYPERLIPIDEMIA LDL GOAL <100: ICD-10-CM

## 2020-07-22 DIAGNOSIS — E11.65 UNCONTROLLED TYPE 2 DIABETES MELLITUS WITH HYPERGLYCEMIA (H): ICD-10-CM

## 2020-07-23 NOTE — TELEPHONE ENCOUNTER
Pending Prescriptions:                       Disp   Refills    atorvastatin (LIPITOR) 20 MG tablet [Pharm*90 tab*0        Sig: Take 1 tablet by mouth once daily

## 2020-07-24 RX ORDER — ATORVASTATIN CALCIUM 20 MG/1
TABLET, FILM COATED ORAL
Qty: 30 TABLET | Refills: 0 | Status: SHIPPED | OUTPATIENT
Start: 2020-07-24 | End: 2020-10-01

## 2020-08-16 DIAGNOSIS — E11.65 UNCONTROLLED TYPE 2 DIABETES MELLITUS WITH HYPERGLYCEMIA (H): ICD-10-CM

## 2020-08-17 NOTE — TELEPHONE ENCOUNTER
Pending Prescriptions:                       Disp   Refills    JANUVIA 100 MG tablet [Pharmacy Med Name: *90 tab*3        Sig: TAKE 1 TABLET DAILY

## 2020-08-18 RX ORDER — SITAGLIPTIN 100 MG/1
TABLET, FILM COATED ORAL
Qty: 90 TABLET | Refills: 0 | Status: SHIPPED | OUTPATIENT
Start: 2020-08-18 | End: 2020-10-01

## 2020-08-26 ENCOUNTER — MEDICAL CORRESPONDENCE (OUTPATIENT)
Dept: HEALTH INFORMATION MANAGEMENT | Facility: CLINIC | Age: 60
End: 2020-08-26

## 2020-08-26 ENCOUNTER — ANCILLARY PROCEDURE (OUTPATIENT)
Dept: BONE DENSITY | Facility: CLINIC | Age: 60
End: 2020-08-26
Payer: OTHER GOVERNMENT

## 2020-08-26 ENCOUNTER — TELEPHONE (OUTPATIENT)
Dept: INTERNAL MEDICINE | Facility: CLINIC | Age: 60
End: 2020-08-26

## 2020-08-26 DIAGNOSIS — C50.011 PAGET'S DISEASE OF THE BREAST, RIGHT (H): ICD-10-CM

## 2020-08-26 DIAGNOSIS — Z78.0 ASYMPTOMATIC MENOPAUSAL STATE: Primary | ICD-10-CM

## 2020-08-26 PROCEDURE — 77080 DXA BONE DENSITY AXIAL: CPT | Performed by: INTERNAL MEDICINE

## 2020-08-26 NOTE — TELEPHONE ENCOUNTER
Patient has had cancer diagnosis, chemo and now on Tamoxifen since last Dexa.  Oncology clinic unable to give order per our xray tech, unsure if oncologist is out of clinic today or why they could or would not order.  KIMMIE Nelson R.N.

## 2020-08-26 NOTE — TELEPHONE ENCOUNTER
Radiology tech calling to request dexa order as patient is coming in today for Dexa.  Last Dexa 11/8/17, normal with recommendation to repeat in 5 years so technically not due.  KIMMIE Nelson R.N.

## 2020-09-01 ENCOUNTER — TRANSFERRED RECORDS (OUTPATIENT)
Dept: HEALTH INFORMATION MANAGEMENT | Facility: CLINIC | Age: 60
End: 2020-09-01

## 2020-10-01 ENCOUNTER — OFFICE VISIT (OUTPATIENT)
Dept: INTERNAL MEDICINE | Facility: CLINIC | Age: 60
End: 2020-10-01
Payer: OTHER GOVERNMENT

## 2020-10-01 VITALS
DIASTOLIC BLOOD PRESSURE: 82 MMHG | HEART RATE: 78 BPM | WEIGHT: 153 LBS | RESPIRATION RATE: 16 BRPM | BODY MASS INDEX: 25.49 KG/M2 | SYSTOLIC BLOOD PRESSURE: 125 MMHG | HEIGHT: 65 IN

## 2020-10-01 DIAGNOSIS — Z00.00 ROUTINE GENERAL MEDICAL EXAMINATION AT A HEALTH CARE FACILITY: Primary | ICD-10-CM

## 2020-10-01 DIAGNOSIS — L40.9 PSORIASIS: ICD-10-CM

## 2020-10-01 DIAGNOSIS — I10 HTN, GOAL BELOW 140/90: ICD-10-CM

## 2020-10-01 DIAGNOSIS — C50.111 MALIGNANT NEOPLASM OF CENTRAL PORTION OF RIGHT BREAST IN FEMALE, ESTROGEN RECEPTOR POSITIVE (H): ICD-10-CM

## 2020-10-01 DIAGNOSIS — Z17.0 MALIGNANT NEOPLASM OF CENTRAL PORTION OF RIGHT BREAST IN FEMALE, ESTROGEN RECEPTOR POSITIVE (H): ICD-10-CM

## 2020-10-01 DIAGNOSIS — E11.65 UNCONTROLLED TYPE 2 DIABETES MELLITUS WITH HYPERGLYCEMIA (H): ICD-10-CM

## 2020-10-01 DIAGNOSIS — F33.41 RECURRENT MAJOR DEPRESSIVE DISORDER, IN PARTIAL REMISSION (H): ICD-10-CM

## 2020-10-01 DIAGNOSIS — E78.5 HYPERLIPIDEMIA LDL GOAL <100: ICD-10-CM

## 2020-10-01 LAB
ERYTHROCYTE [DISTWIDTH] IN BLOOD BY AUTOMATED COUNT: 12.5 % (ref 10–15)
HBA1C MFR BLD: 6.6 % (ref 0–5.6)
HCT VFR BLD AUTO: 39 % (ref 35–47)
HGB BLD-MCNC: 12.6 G/DL (ref 11.7–15.7)
MCH RBC QN AUTO: 29.4 PG (ref 26.5–33)
MCHC RBC AUTO-ENTMCNC: 32.3 G/DL (ref 31.5–36.5)
MCV RBC AUTO: 91 FL (ref 78–100)
PLATELET # BLD AUTO: 203 10E9/L (ref 150–450)
RBC # BLD AUTO: 4.28 10E12/L (ref 3.8–5.2)
WBC # BLD AUTO: 6.2 10E9/L (ref 4–11)

## 2020-10-01 PROCEDURE — 85027 COMPLETE CBC AUTOMATED: CPT | Performed by: INTERNAL MEDICINE

## 2020-10-01 PROCEDURE — 99396 PREV VISIT EST AGE 40-64: CPT | Performed by: INTERNAL MEDICINE

## 2020-10-01 PROCEDURE — 99214 OFFICE O/P EST MOD 30 MIN: CPT | Mod: 25 | Performed by: INTERNAL MEDICINE

## 2020-10-01 PROCEDURE — 83036 HEMOGLOBIN GLYCOSYLATED A1C: CPT | Performed by: INTERNAL MEDICINE

## 2020-10-01 PROCEDURE — 84443 ASSAY THYROID STIM HORMONE: CPT | Performed by: INTERNAL MEDICINE

## 2020-10-01 PROCEDURE — 80053 COMPREHEN METABOLIC PANEL: CPT | Performed by: INTERNAL MEDICINE

## 2020-10-01 PROCEDURE — 82043 UR ALBUMIN QUANTITATIVE: CPT | Performed by: INTERNAL MEDICINE

## 2020-10-01 PROCEDURE — 80061 LIPID PANEL: CPT | Performed by: INTERNAL MEDICINE

## 2020-10-01 PROCEDURE — 99207 PR FOOT EXAM NO CHARGE: CPT | Mod: 25 | Performed by: INTERNAL MEDICINE

## 2020-10-01 PROCEDURE — 36415 COLL VENOUS BLD VENIPUNCTURE: CPT | Performed by: INTERNAL MEDICINE

## 2020-10-01 RX ORDER — LISINOPRIL 10 MG/1
TABLET ORAL
Qty: 90 TABLET | Refills: 0 | Status: SHIPPED | OUTPATIENT
Start: 2020-10-01 | End: 2020-12-21

## 2020-10-01 RX ORDER — ATORVASTATIN CALCIUM 20 MG/1
20 TABLET, FILM COATED ORAL DAILY
Qty: 90 TABLET | Refills: 0 | Status: SHIPPED | OUTPATIENT
Start: 2020-10-01 | End: 2020-12-21

## 2020-10-01 ASSESSMENT — ENCOUNTER SYMPTOMS
JOINT SWELLING: 0
HEARTBURN: 1
BREAST MASS: 0
EYE PAIN: 0
DYSURIA: 0
SHORTNESS OF BREATH: 0
FEVER: 0
SORE THROAT: 0
ABDOMINAL PAIN: 0
WEAKNESS: 0
HEADACHES: 0
FREQUENCY: 0
DIARRHEA: 0
ARTHRALGIAS: 0
PALPITATIONS: 0
CHILLS: 0
NERVOUS/ANXIOUS: 0
HEMATOCHEZIA: 0
MYALGIAS: 0
NAUSEA: 0
PARESTHESIAS: 0
DIZZINESS: 0
COUGH: 0
CONSTIPATION: 0
HEMATURIA: 0

## 2020-10-01 ASSESSMENT — MIFFLIN-ST. JEOR: SCORE: 1274.88

## 2020-10-01 NOTE — PATIENT INSTRUCTIONS
Plan:  1.  Labs today - [address]   2. Continue same meds, same doses for now   3.  The following vaccines are recommended for you. Please check with your insurance about coverage.  Some insurances cover better if you have these vaccines at the pharmacy:  -- Prevnar 13 ( pneumonia vaccine) - check with insurance - it is recommended because of diabetes   -- Pneumovax 23 ( different pneumonia vaccine ) - it will be done 1 year after the Prevnar vaccine   -- Shingerix vaccine - the newest vaccine for shingles   -- flu vaccine

## 2020-10-01 NOTE — PROGRESS NOTES
Dr Salazar's note    Patient's instructions / PLAN:                                                        Plan:  1.  Labs today - [address]   2. Continue same meds, same doses for now   3.  The following vaccines are recommended for you. Please check with your insurance about coverage.  Some insurances cover better if you have these vaccines at the pharmacy:  -- Prevnar 13 ( pneumonia vaccine) - check with insurance - it is recommended because of diabetes   -- Pneumovax 23 ( different pneumonia vaccine ) - it will be done 1 year after the Prevnar vaccine   -- Shingerix vaccine - the newest vaccine for shingles   -- flu vaccine            ASSESSMENT & PLAN:                                                      (Z00.00) Routine general medical examination at a health care facility  (primary encounter diagnosis)  Comment:   Plan:     (C50.111,  Z17.0) Malignant neoplasm of central portion of right breast in female, estrogen receptor positive (H)  Comment: stable   Plan f/u w dr Pinzon, onco    (F33.41) Recurrent major depressive disorder, in partial remission (H) #  Comment: Controlled    Plan: Continue same meds, same doses for now     (E11.65) DM 2, no insulin (H)  Comment: not sure if controlled or not  Plan: atorvastatin (LIPITOR) 20 MG tablet,         sitagliptin (JANUVIA) 100 MG tablet, lisinopril        (ZESTRIL) 10 MG tablet, metFORMIN (GLUCOPHAGE)         1000 MG tablet            (E78.5) Hyperlipidemia LDL goal <100  Comment:   Plan: atorvastatin (LIPITOR) 20 MG tablet            (I10) HTN, goal below 140/90  Comment: Controlled    Plan: lisinopril (ZESTRIL) 10 MG tablet            (L40.9) Psoriasis Dx 2019 #  Comment:   Plan:        Chief Complaint:                                                        Annual exam  Follow up chronic medical problems      SUBJECTIVE:                                                    History of present illness     We reviewed the chronic medical problems as above.   I  reviewed the recent tests results in Epic       ROS:     See below        PMHx: - reviewed  Past Medical History:   Diagnosis Date     Breast cancer (H)      Cancer (H)      Diabetes (H)      History of blood transfusion      Hypertension      Papanicolaou smear of cervix with low grade squamous intraepithelial lesion (LGSIL) 2017 LSIL/Neg HPV. Plan: cotest in 1 year       PSHx: reviewed  Past Surgical History:   Procedure Laterality Date     BIOPSY       BREAST SURGERY       COLONOSCOPY N/A 3/11/2019    Procedure: COLONOSCOPY;  Surgeon: Nam Shah MD;  Location: [facility]     DILATION AND CURETTAGE, OPERATIVE HYSTEROSCOPY, COMBINED N/A 2018    Procedure: COMBINED DILATION AND CURETTAGE, OPERATIVE HYSTEROSCOPY;  Hysteroscopy, Dilation and Curettage;  Surgeon: Lashawn Hernandez MD;  Location: [facility] OR     GYN SURGERY       MASTECTOMY          Soc Hx: No daily alcohol, no smoking  Social History     Socioeconomic History     Marital status:      Spouse name: Not on file     Number of children: Not on file     Years of education: Not on file     Highest education level: Not on file   Occupational History     Not on file   Social Needs     Financial resource strain: Not on file     Food insecurity     Worry: Not on file     Inability: Not on file     Transportation needs     Medical: Not on file     Non-medical: Not on file   Tobacco Use     Smoking status: Former Smoker     Types: Cigarettes     Quit date: 8/3/1997     Years since quittin.1     Smokeless tobacco: Never Used   Substance and Sexual Activity     Alcohol use: Yes     Comment: socially     Drug use: No     Sexual activity: Yes     Partners: Male   Lifestyle     Physical activity     Days per week: Not on file     Minutes per session: Not on file     Stress: Not on file   Relationships     Social connections     Talks on phone: Not on file     Gets together: Not on file     Attends Cheondoism service: Not on file      "Active member of club or organization: Not on file     Attends meetings of clubs or organizations: Not on file     Relationship status: Not on file     Intimate partner violence     Fear of current or ex partner: Not on file     Emotionally abused: Not on file     Physically abused: Not on file     Forced sexual activity: Not on file   Other Topics Concern     Parent/sibling w/ CABG, MI or angioplasty before 65F 55M? Not Asked   Social History Narrative     Not on file        Fam Hx: reviewed  Family History   Problem Relation Age of Onset     Other - See Comments Mother         hysterectomy for benign     Diabetes Father          during bilateral leg amputation      Myocardial Infarction Maternal Grandmother 40     Kidney Disease Paternal Grandmother         On dialysis     Cardiac Sudden Death Paternal Grandfather 60         during angiogram     Diabetes Brother 40     Other - See Comments Sister         hysterectomy for benign     Breast Cancer No family hx of      Colon Cancer No family hx of          Screening: reviewed      All: reviewed    Meds: reviewed  Current Outpatient Medications   Medication Sig Dispense Refill     apremilast (OTEZLA) 30 MG tablet Take 1 tablet (30 mg) by mouth 2 times daily       atorvastatin (LIPITOR) 20 MG tablet Take 1 tablet by mouth once daily 30 tablet 0     JANUVIA 100 MG tablet TAKE 1 TABLET DAILY 90 tablet 0     lisinopril (ZESTRIL) 10 MG tablet Take 1/2 (one-half) tablet by mouth once daily 90 tablet 0     metFORMIN (GLUCOPHAGE) 1000 MG tablet TAKE 1 TABLET (1000 MG) BY MOUTH TWICE DAILY WITH MEALS 180 tablet 0     TAMOXIFEN CITRATE PO Take by mouth daily       Venlafaxine HCl (EFFEXOR PO) Take 50 mg by mouth daily         OBJECTIVE:                                                    Physical Exam :  Blood pressure 125/82, pulse 78, resp. rate 16, height 1.651 m (5' 5\"), weight 69.4 kg (153 lb), last menstrual period 2017, not currently breastfeeding.     NAD, " appears comfortable  Skin clear, no rashes  Neck: supple, no JVD,  no thyroidmegaly  Lymph nodes non palpable in the cervical, supraclavicular axillaries,   Chest: clear to auscultation with good respiratory effort  Cardiac: S1S2, RRR, no mgr appreciated  Abdomen: soft, not tender, not distended, audible bowel sound, no hepatosplenomegaly, no palpable masses, no abdominal bruits  Extremities: no cyanosis, clubbing or edema. No skin lesions. Monofilament intact. Good pedal pulse  Neuro: A, Ox3, no focal signs.  Breast exam deferred. She had the exam w dr Pinzon    Pelvic exam: deferred, no symptoms, no hx of abnormal pap         Archana Salazar MD  Internal Medicine          SUBJECTIVE:   CC: Cheryl Patel is an 59 year old woman who presents for preventive health visit.       Patient has been advised of split billing requirements and indicates understanding: Yes  Healthy Habits:     Getting at least 3 servings of Calcium per day:  Yes    Bi-annual eye exam:  NO    Dental care twice a year:  NO    Sleep apnea or symptoms of sleep apnea:  None    Diet:  Diabetic    Frequency of exercise:  None    Taking medications regularly:  Yes    Medication side effects:  None    PHQ-2 Total Score: 0    Additional concerns today:  No    Ability to successfully perform activities of daily living: Yes, no assistance needed  Home safety:  none identified   Hearing impairment: No        Diabetes Follow-up      How often are you checking your blood sugar? Not at all    What concerns do you have today about your diabetes? None     Do you have any of these symptoms? (Select all that apply)  No numbness or tingling in feet.  No redness, sores or blisters on feet.  No complaints of excessive thirst.  No reports of blurry vision.  No significant changes to weight.    Have you had a diabetic eye exam in the last 12 months? No                Hyperlipidemia Follow-Up      Are you regularly taking any medication or supplement to lower your  cholesterol?   Yes- lipitor    Are you having muscle aches or other side effects that you think could be caused by your cholesterol lowering medication?  No    Hypertension Follow-up      Do you check your blood pressure regularly outside of the clinic? No     Are you following a low salt diet? Yes    Are your blood pressures ever more than 140 on the top number (systolic) OR more   than 90 on the bottom number (diastolic), for example 140/90? No    BP Readings from Last 2 Encounters:   10/01/20 125/82   20 93/64     Hemoglobin A1C (%)   Date Value   2019 6.8 (H)   2019 7.5 (H)     LDL Cholesterol Calculated (mg/dL)   Date Value   2019 89   2017 87         Today's PHQ-2 Score:   PHQ-2 (  [employer]) 10/1/2020   Q1: Little interest or pleasure in doing things 0   Q2: Feeling down, depressed or hopeless 0   PHQ-2 Score 0   Q1: Little interest or pleasure in doing things Not at all   Q2: Feeling down, depressed or hopeless Not at all   PHQ-2 Score 0       Abuse: Current or Past (Physical, Sexual or Emotional) - No  Do you feel safe in your environment? Yes        Social History     Tobacco Use     Smoking status: Former Smoker     Types: Cigarettes     Quit date: 8/3/1997     Years since quittin.1     Smokeless tobacco: Never Used   Substance Use Topics     Alcohol use: Yes     Comment: socially         Alcohol Use 10/1/2020   Prescreen: >3 drinks/day or >7 drinks/week? No   Prescreen: >3 drinks/day or >7 drinks/week? -       Reviewed orders with patient.  Reviewed health maintenance and updated orders accordingly -   Labs reviewed in EPIC          Pertinent mammograms are reviewed under the imaging tab.  History of abnormal Pap smear:   PAP / HPV Latest Ref Rng & Units 2019   PAP - NIL LSIL(A)   HPV 16 DNA NEG:Negative Negative Negative   HPV 18 DNA NEG:Negative Negative Negative   OTHER HR HPV NEG:Negative Negative Negative     Reviewed and updated as needed this  "visit by clinical staff  Tobacco  Allergies  Meds              Reviewed and updated as needed this visit by Provider                    Review of Systems   Constitutional: Negative for chills and fever.   HENT: Negative for congestion, ear pain, hearing loss and sore throat.    Eyes: Positive for visual disturbance. Negative for pain.   Respiratory: Negative for cough and shortness of breath.    Cardiovascular: Negative for chest pain, palpitations and peripheral edema.   Gastrointestinal: Positive for heartburn. Negative for abdominal pain, constipation, diarrhea, hematochezia and nausea.   Breasts:  Negative for tenderness, breast mass and discharge.   Genitourinary: Negative for dysuria, frequency, genital sores, hematuria, pelvic pain, urgency, vaginal bleeding and vaginal discharge.   Musculoskeletal: Negative for arthralgias, joint swelling and myalgias.   Skin: Negative for rash.   Neurological: Negative for dizziness, weakness, headaches and paresthesias.   Psychiatric/Behavioral: Negative for mood changes. The patient is not nervous/anxious.        Patient has been advised of split billing requirements and indicates understanding: Yes At the check in, at the    COUNSELING:  Reviewed preventive health counseling, as reflected in patient instructions       Regular exercise       Healthy diet/nutrition    Estimated body mass index is 25.46 kg/m  as calculated from the following:    Height as of this encounter: 1.651 m (5' 5\").    Weight as of this encounter: 69.4 kg (153 lb).        She reports that she quit smoking about 23 years ago. Her smoking use included cigarettes. She has never used smokeless tobacco.      Counseling Resources:  ATP IV Guidelines  Pooled Cohorts Equation Calculator  Breast Cancer Risk Calculator  BRCA-Related Cancer Risk Assessment: FHS-7 Tool  FRAX Risk Assessment  ICSI Preventive Guidelines  Dietary Guidelines for Americans, 2010  USDA's MyPlate  ASA Prophylaxis  Lung CA " Screening    Archana Chen MD  [facility]

## 2020-10-02 LAB
ALBUMIN SERPL-MCNC: 3.6 G/DL (ref 3.4–5)
ALP SERPL-CCNC: 59 U/L (ref 40–150)
ALT SERPL W P-5'-P-CCNC: 22 U/L (ref 0–50)
ANION GAP SERPL CALCULATED.3IONS-SCNC: 9 MMOL/L (ref 3–14)
AST SERPL W P-5'-P-CCNC: 13 U/L (ref 0–45)
BILIRUB SERPL-MCNC: 0.4 MG/DL (ref 0.2–1.3)
BUN SERPL-MCNC: 19 MG/DL (ref 7–30)
CALCIUM SERPL-MCNC: 8.5 MG/DL (ref 8.5–10.1)
CHLORIDE SERPL-SCNC: 108 MMOL/L (ref 94–109)
CHOLEST SERPL-MCNC: 214 MG/DL
CO2 SERPL-SCNC: 22 MMOL/L (ref 20–32)
CREAT SERPL-MCNC: 0.8 MG/DL (ref 0.52–1.04)
CREAT UR-MCNC: 212 MG/DL
GFR SERPL CREATININE-BSD FRML MDRD: 81 ML/MIN/{1.73_M2}
GLUCOSE SERPL-MCNC: 104 MG/DL (ref 70–99)
HDLC SERPL-MCNC: 62 MG/DL
LDLC SERPL CALC-MCNC: 107 MG/DL
MICROALBUMIN UR-MCNC: 19 MG/L
MICROALBUMIN/CREAT UR: 8.87 MG/G CR (ref 0–25)
NONHDLC SERPL-MCNC: 152 MG/DL
POTASSIUM SERPL-SCNC: 3.7 MMOL/L (ref 3.4–5.3)
PROT SERPL-MCNC: 6.9 G/DL (ref 6.8–8.8)
SODIUM SERPL-SCNC: 139 MMOL/L (ref 133–144)
TRIGL SERPL-MCNC: 226 MG/DL
TSH SERPL DL<=0.005 MIU/L-ACNC: 2.31 MU/L (ref 0.4–4)

## 2020-10-23 ENCOUNTER — TELEPHONE (OUTPATIENT)
Dept: INTERNAL MEDICINE | Facility: CLINIC | Age: 60
End: 2020-10-23

## 2020-10-23 NOTE — TELEPHONE ENCOUNTER
Fax received from [facility] - Dexa Screening Form for review and signature.  Put in Dr. Powers's in basket.

## 2020-11-30 ENCOUNTER — TRANSFERRED RECORDS (OUTPATIENT)
Dept: HEALTH INFORMATION MANAGEMENT | Facility: CLINIC | Age: 60
End: 2020-11-30

## 2020-12-21 ENCOUNTER — MYC REFILL (OUTPATIENT)
Dept: INTERNAL MEDICINE | Facility: CLINIC | Age: 60
End: 2020-12-21

## 2020-12-21 DIAGNOSIS — I10 HTN, GOAL BELOW 140/90: ICD-10-CM

## 2020-12-21 DIAGNOSIS — E78.5 HYPERLIPIDEMIA LDL GOAL <100: ICD-10-CM

## 2020-12-21 DIAGNOSIS — E11.65 UNCONTROLLED TYPE 2 DIABETES MELLITUS WITH HYPERGLYCEMIA (H): ICD-10-CM

## 2020-12-21 RX ORDER — ATORVASTATIN CALCIUM 20 MG/1
20 TABLET, FILM COATED ORAL DAILY
Qty: 90 TABLET | Refills: 0 | Status: SHIPPED | OUTPATIENT
Start: 2020-12-21 | End: 2022-02-15 | Stop reason: ALTCHOICE

## 2020-12-21 RX ORDER — LISINOPRIL 10 MG/1
TABLET ORAL
Qty: 90 TABLET | Refills: 0 | Status: SHIPPED | OUTPATIENT
Start: 2020-12-21 | End: 2021-07-19

## 2020-12-22 ENCOUNTER — MYC MEDICAL ADVICE (OUTPATIENT)
Dept: INTERNAL MEDICINE | Facility: CLINIC | Age: 60
End: 2020-12-22

## 2020-12-22 DIAGNOSIS — E11.65 UNCONTROLLED TYPE 2 DIABETES MELLITUS WITH HYPERGLYCEMIA (H): ICD-10-CM

## 2020-12-27 ENCOUNTER — HEALTH MAINTENANCE LETTER (OUTPATIENT)
Age: 60
End: 2020-12-27

## 2021-01-22 ENCOUNTER — TRANSFERRED RECORDS (OUTPATIENT)
Dept: HEALTH INFORMATION MANAGEMENT | Facility: CLINIC | Age: 61
End: 2021-01-22

## 2021-02-08 ENCOUNTER — OFFICE VISIT (OUTPATIENT)
Dept: INTERNAL MEDICINE | Facility: CLINIC | Age: 61
End: 2021-02-08
Payer: OTHER GOVERNMENT

## 2021-02-08 VITALS
SYSTOLIC BLOOD PRESSURE: 138 MMHG | DIASTOLIC BLOOD PRESSURE: 80 MMHG | HEART RATE: 118 BPM | OXYGEN SATURATION: 99 % | BODY MASS INDEX: 26.16 KG/M2 | WEIGHT: 157.2 LBS | TEMPERATURE: 98 F

## 2021-02-08 DIAGNOSIS — Z01.818 PREOP GENERAL PHYSICAL EXAM: Primary | ICD-10-CM

## 2021-02-08 DIAGNOSIS — N93.9 VAGINAL BLEEDING: ICD-10-CM

## 2021-02-08 DIAGNOSIS — E11.65 UNCONTROLLED TYPE 2 DIABETES MELLITUS WITH HYPERGLYCEMIA (H): ICD-10-CM

## 2021-02-08 LAB
BASOPHILS # BLD AUTO: 0 10E9/L (ref 0–0.2)
BASOPHILS NFR BLD AUTO: 0.5 %
DIFFERENTIAL METHOD BLD: NORMAL
EOSINOPHIL # BLD AUTO: 0.2 10E9/L (ref 0–0.7)
EOSINOPHIL NFR BLD AUTO: 4.2 %
ERYTHROCYTE [DISTWIDTH] IN BLOOD BY AUTOMATED COUNT: 12.6 % (ref 10–15)
HBA1C MFR BLD: 7 % (ref 0–5.6)
HCT VFR BLD AUTO: 40.8 % (ref 35–47)
HGB BLD-MCNC: 13.4 G/DL (ref 11.7–15.7)
LYMPHOCYTES # BLD AUTO: 1.5 10E9/L (ref 0.8–5.3)
LYMPHOCYTES NFR BLD AUTO: 35.6 %
MCH RBC QN AUTO: 29.7 PG (ref 26.5–33)
MCHC RBC AUTO-ENTMCNC: 32.8 G/DL (ref 31.5–36.5)
MCV RBC AUTO: 91 FL (ref 78–100)
MONOCYTES # BLD AUTO: 0.3 10E9/L (ref 0–1.3)
MONOCYTES NFR BLD AUTO: 7.4 %
NEUTROPHILS # BLD AUTO: 2.3 10E9/L (ref 1.6–8.3)
NEUTROPHILS NFR BLD AUTO: 52.3 %
PLATELET # BLD AUTO: 223 10E9/L (ref 150–450)
RBC # BLD AUTO: 4.51 10E12/L (ref 3.8–5.2)
WBC # BLD AUTO: 4.3 10E9/L (ref 4–11)

## 2021-02-08 PROCEDURE — 85025 COMPLETE CBC W/AUTO DIFF WBC: CPT | Performed by: NURSE PRACTITIONER

## 2021-02-08 PROCEDURE — 99214 OFFICE O/P EST MOD 30 MIN: CPT | Performed by: NURSE PRACTITIONER

## 2021-02-08 PROCEDURE — 80048 BASIC METABOLIC PNL TOTAL CA: CPT | Performed by: NURSE PRACTITIONER

## 2021-02-08 PROCEDURE — 83036 HEMOGLOBIN GLYCOSYLATED A1C: CPT | Performed by: NURSE PRACTITIONER

## 2021-02-08 PROCEDURE — 93000 ELECTROCARDIOGRAM COMPLETE: CPT | Performed by: NURSE PRACTITIONER

## 2021-02-08 PROCEDURE — 36415 COLL VENOUS BLD VENIPUNCTURE: CPT | Performed by: NURSE PRACTITIONER

## 2021-02-08 NOTE — PROGRESS NOTES
[place] [address]  [facility] [address]  Phone: [phone]  Primary Provider: Archana Chen        PREOPERATIVE EVALUATION:  Today's date: 2/8/2021    Cheryl Patel is a 60 year old female who presents for a preoperative evaluation.    Surgical Information:  Surgery/Procedure: D & C  Surgery Location: [facility]  Surgeon: Dr. High  Surgery Date: 2/9/21  Time of Surgery: 11 AM  Where patient plans to recover: At home with family  Fax number for surgical facility: [phone]    Type of Anesthesia Anticipated: to be determined      Subjective     HPI related to upcoming procedure: Having D & C with biopsy related to vaginal bleeding with recent US showing thickened uterus; history of right breast cancer.    Preop Questions 2/8/2021   1. Have you ever had a heart attack or stroke? No   2. Have you ever had surgery on your heart or blood vessels, such as a stent placement, a coronary artery bypass, or surgery on an artery in your head, neck, heart, or legs? No   3. Do you have chest pain with activity? No   4. Do you have a history of  heart failure? No   5. Do you currently have a cold, bronchitis or symptoms of other infection? No   6. Do you have a cough, shortness of breath, or wheezing? No   7. Do you or anyone in your family have previous history of blood clots? No   8. Do you or does anyone in your family have a serious bleeding problem such as prolonged bleeding following surgeries or cuts? No   9. Have you ever had problems with anemia or been told to take iron pills? No   10. Have you had any abnormal blood loss such as black, tarry or bloody stools, or abnormal vaginal bleeding? YES - in 2015 due to vaginal bleeding and had transfusion   11. Have you ever had a blood transfusion? YES - see above related to vaginal bleeding   11a. Have you ever had a transfusion reaction? No   12. Are you willing to have a blood transfusion if  it is medically needed before, during, or after your surgery? Yes   13. Have you or any of your relatives ever had problems with anesthesia? No   14. Do you have sleep apnea, excessive snoring or daytime drowsiness? No   15. Do you have any artifical heart valves or other implanted medical devices like a pacemaker, defibrillator, or continuous glucose monitor? No   16. Do you have artificial joints? No   17. Are you allergic to latex? No   18. Is there any chance that you may be pregnant? No       Health Care Directive:  Patient does not have a Health Care Directive or Living Will: Discussed advance care planning with patient; however, patient declined at this time.    Preoperative Review of :   reviewed - no record of controlled substances prescribed.    Reviewed chronic medical problems with no acute changes and on medications: Type 2 DM, Hypertension, anxiety Psoriasis, and breast cancer history      Review of Systems  CONSTITUTIONAL: NEGATIVE for fever, chills, change in weight  INTEGUMENTARY/SKIN: NEGATIVE for worrisome rashes, moles or lesions  EYES: NEGATIVE for vision changes or irritation  ENT/MOUTH: NEGATIVE for ear, mouth and throat problems  RESP: NEGATIVE for significant cough or SOB  BREAST: NEGATIVE for masses, tenderness or discharge  CV: NEGATIVE for chest pain, palpitations or peripheral edema  GI: NEGATIVE for nausea, abdominal pain, heartburn, or change in bowel habits  : NEGATIVE for frequency, dysuria, or hematuria  MUSCULOSKELETAL: NEGATIVE for significant arthralgias or myalgia  NEURO: NEGATIVE for weakness, dizziness or paresthesias  ENDOCRINE: NEGATIVE for temperature intolerance, skin/hair changes  HEME: NEGATIVE for bleeding problems  PSYCHIATRIC: NEGATIVE for changes in mood or affect    Patient Active Problem List    Diagnosis Date Noted     Psoriasis Dx 2019 # 05/26/2019     Priority: Medium     DM 2, no insulin (H) # 05/26/2019     Priority: Medium     HTN, goal below 140/90 #  03/02/2019     Priority: Medium     Hyperlipidemia LDL goal <100 # 01/08/2018     Priority: Medium     Recurrent major depressive disorder, in partial remission (H) # 01/08/2018     Priority: Medium     Papanicolaou smear of cervix with low grade squamous intraepithelial lesion (LGSIL) 08/18/2017     Priority: Medium     8/18/17 LSIL/Neg HPV. Plan: cotest in 1 year  1/20/19 Lost to follow-up for pap tracking  2/25/19 NIL pap, neg HR HPV. Plan pending provider due to cancer dx         Advanced directives, counseling/discussion 08/03/2017     Priority: Medium     Advance Care Planning 8/3/2017: ACP Review of Chart / Resources Provided:  Reviewed chart for advance care plan.  Cheryl TAYLOR Patel has no plan or code status on file. Discussed available resources and provided with information.   Added by Lea Gilman             Malignant neoplasm of central portion of right breast in female, estrogen receptor positive (H) 07/27/2017     Priority: Medium      Past Medical History:   Diagnosis Date     Anxiety      Breast cancer (H)      Cancer (H)      Diabetes (H)      History of blood transfusion      Hyperlipidemia LDL goal <100      Hypertension      Papanicolaou smear of cervix with low grade squamous intraepithelial lesion (LGSIL) 08/18/2017 8/18/17 LSIL/Neg HPV. Plan: cotest in 1 year     Past Surgical History:   Procedure Laterality Date     BIOPSY       BREAST SURGERY       COLONOSCOPY N/A 3/11/2019    Procedure: COLONOSCOPY;  Surgeon: Nam Shah MD;  Location:  GI     DILATION AND CURETTAGE, OPERATIVE HYSTEROSCOPY, COMBINED N/A 5/18/2018    Procedure: COMBINED DILATION AND CURETTAGE, OPERATIVE HYSTEROSCOPY;  Hysteroscopy, Dilation and Curettage;  Surgeon: Lashawn Hernandez MD;  Location: [facility] OR     GYN SURGERY       MASTECTOMY       Current Outpatient Medications   Medication Sig Dispense Refill     apremilast (OTEZLA) 30 MG tablet Take 1 tablet (30 mg) by mouth 2 times daily        atorvastatin (LIPITOR) 20 MG tablet Take 1 tablet (20 mg) by mouth daily 90 tablet 0     lisinopril (ZESTRIL) 10 MG tablet Take 1/2 (one-half) tablet by mouth once daily 90 tablet 0     metFORMIN (GLUCOPHAGE) 1000 MG tablet TAKE 1 TABLET (1000 MG) BY MOUTH TWICE DAILY WITH MEALS 180 tablet 0     sitagliptin (JANUVIA) 100 MG tablet Take 1 tablet (100 mg) by mouth daily 90 tablet 0     TAMOXIFEN CITRATE PO Take by mouth daily       Venlafaxine HCl (EFFEXOR PO) Take 50 mg by mouth daily         No Known Allergies     Social History     Tobacco Use     Smoking status: Former Smoker     Types: Cigarettes     Quit date: 8/3/1997     Years since quittin.5     Smokeless tobacco: Never Used   Substance Use Topics     Alcohol use: Yes     Comment: socially     Family History   Problem Relation Age of Onset     Other - See Comments Mother         hysterectomy for benign     Diabetes Father          during bilateral leg amputation      Myocardial Infarction Maternal Grandmother 40     Kidney Disease Paternal Grandmother         On dialysis     Cardiac Sudden Death Paternal Grandfather 60         during angiogram     Diabetes Brother 40     Other - See Comments Sister         hysterectomy for benign     Breast Cancer No family hx of      Colon Cancer No family hx of      History   Drug Use No         Objective     /80   Pulse 118   Temp 98  F (36.7  C) (Oral)   Wt 71.3 kg (157 lb 3.2 oz)   LMP 2017   SpO2 99%   BMI 26.16 kg/m      Physical Exam    GENERAL APPEARANCE: alert and no distress     EYES: EOMI, PERRL     HENT: ear canals and TM's normal and nose and mouth without ulcers or lesions     NECK: no adenopathy, no asymmetry, masses, or scars and thyroid normal to palpation     RESP: lungs clear to auscultation - no rales, rhonchi or wheezes     CV: regular rates and rhythm, normal S1 S2, no S3 or S4 and no murmur, click or rub     ABDOMEN:  soft, nontender, no HSM or masses and bowel sounds  normal     MS: extremities normal- no gross deformities noted, no evidence of inflammation in joints, FROM in all extremities.     SKIN: no suspicious lesions or rashes     NEURO: Normal strength and tone, sensory exam grossly normal, mentation intact and speech normal     PSYCH: mentation appears normal. and affect normal/bright but anxious     LYMPHATICS: No cervical adenopathy    Recent Labs   Lab Test 10/01/20  1405 12/04/19  1300 05/02/19  1045 02/18/19  0848 02/18/19  0848   HGB 12.6  --   --   --  12.8     --   --   --  186     --  140  --  140   POTASSIUM 3.7  --  4.2  --  4.2   CR 0.80  --  0.63  --  0.60   A1C 6.6* 6.8* 7.5*   < >  --     < > = values in this interval not displayed.        Diagnostics:  Labs ordered and pending: BMP, CBC, and A1C  EKG: sinus tachycardia, normal axis, normal intervals, no acute ST/T changes c/w ischemia, no LVH by voltage criteria, nonspecific ST-T changes    Revised Cardiac Risk Index (RCRI):  The patient has the following serious cardiovascular risks for perioperative complications:   - No serious cardiac risks = 0 points     RCRI Interpretation: 0 points: Class I (very low risk - 0.4% complication rate)    Diagnosis:   Preop general physical exam  - ordered EKG and labs: BMP, CBC    Vaginal bleeding  - scheduled for D & C    Type 2 DM  - ordered A1C (last A1C 6.6)  - HOLD Januvia and Metformin morning of surgery    Take medication Lisinopril with sip of water; HOLD all other scheduled medications until after surgery    Patient has been approved           Signed Electronically by: Elizabeth Escoto CNP  Copy of this evaluation report is provided to requesting physician.    Preop [facility] Preop Guidelines    Revised Cardiac Risk Index

## 2021-02-08 NOTE — LETTER
February 10, 2021      Cheryl Patel  [address] [address] [facility] [address]        Dear ,    We are writing to inform you of your test results.    Your glucose (153 H) and A1C for diabetes at 7.0 have slightly increased. Diet and exercise can help.      Resulted Orders   Basic metabolic panel   Result Value Ref Range    Sodium 140 133 - 144 mmol/L    Potassium 3.6 3.4 - 5.3 mmol/L    Chloride 107 94 - 109 mmol/L    Carbon Dioxide 25 20 - 32 mmol/L    Anion Gap 8 3 - 14 mmol/L    Glucose 153 (H) 70 - 99 mg/dL      Comment:      Non Fasting    Urea Nitrogen 12 7 - 30 mg/dL    Creatinine 0.66 0.52 - 1.04 mg/dL    GFR Estimate >90 >60 mL/min/[1.73_m2]      Comment:      Non  GFR Calc  Starting 12/18/2018, serum creatinine based estimated GFR (eGFR) will be   calculated using the Chronic Kidney Disease Epidemiology Collaboration   (CKD-EPI) equation.      GFR Estimate If Black >90 >60 mL/min/[1.73_m2]      Comment:       GFR Calc  Starting 12/18/2018, serum creatinine based estimated GFR (eGFR) will be   calculated using the Chronic Kidney Disease Epidemiology Collaboration   (CKD-EPI) equation.      Calcium 9.4 8.5 - 10.1 mg/dL   CBC with platelets differential   Result Value Ref Range    WBC 4.3 4.0 - 11.0 10e9/L    RBC Count 4.51 3.8 - 5.2 10e12/L    Hemoglobin 13.4 11.7 - 15.7 g/dL    Hematocrit 40.8 35.0 - 47.0 %    MCV 91 78 - 100 fl    MCH 29.7 26.5 - 33.0 pg    MCHC 32.8 31.5 - 36.5 g/dL    RDW 12.6 10.0 - 15.0 %    Platelet Count 223 150 - 450 10e9/L    % Neutrophils 52.3 %    % Lymphocytes 35.6 %    % Monocytes 7.4 %    % Eosinophils 4.2 %    % Basophils 0.5 %    Absolute Neutrophil 2.3 1.6 - 8.3 10e9/L    Absolute Lymphocytes 1.5 0.8 - 5.3 10e9/L    Absolute Monocytes 0.3 0.0 - 1.3 10e9/L    Absolute Eosinophils 0.2 0.0 - 0.7 10e9/L    Absolute Basophils 0.0 0.0 - 0.2 10e9/L    Diff Method Automated Method    Hemoglobin A1c   Result Value Ref Range     Hemoglobin A1C 7.0 (H) 0 - 5.6 %      Comment:      Normal <5.7% Prediabetes 5.7-6.4%  Diabetes 6.5% or higher - adopted from ADA   consensus guidelines.         If you have any questions or concerns, please call the clinic at the number listed above.       Sincerely,      Elizabeth Escoto CNP

## 2021-02-08 NOTE — NURSING NOTE
Today's pre-op notes, EKG and lab results were faxed to [facility] at [phone].

## 2021-02-08 NOTE — PATIENT INSTRUCTIONS
Pre op physical cleared for surgery    Go to [address] for labs.    EKG essentially ok.    Preparing for Your Surgery  Getting started  A nurse will call you to review your health history and instructions. They will give you an arrival time based on your scheduled surgery time.  Please be ready to share the following:    Your doctor's clinic name and phone number    Your medical, surgical and anesthesia history    A list of allergies and sensitivities    A list of medicines, including herbal treatments and over-the-counter drugs    Whether the patient has a legal guardian (ask how to send us the papers in advance)  If you have a child who's having surgery, please ask for a copy of Preparing for Your Child's Surgery.    Preparing for surgery    Within 30 days of surgery: Have a pre-op exam (sometimes called an H&P, or History and Physical). This can be done at a clinic or pre-operative center.  ? If you're having a , you may not need this exam. Talk to your care team    At your pre-op exam, talk to your care team about all medicines you take. If you need to stop any medicines before surgery, ask when to start taking them again.  ? We do this for your safety. Many medicines can make you bleed too much during surgery. Some change how well surgery (anesthesia) drugs work.    Call your insurance company to let them know you're having surgery. (If you don't have insurance, call [phone].)    Call your clinic if there's any change in your health. This includes signs of a cold or flu (sore throat, runny nose, cough, rash, fever). It also includes a scrape or scratch near the surgery site.    If you have questions on the day of surgery, call your hospital or surgery center.  Eating and drinking guidelines  For your safety: Unless your surgeon tells you otherwise, follow the guidelines below.    Eat and drink as usual until 8 hours before surgery. After that, no food or milk.    Drink clear liquids until 2 hours  before surgery. These are liquids you can see through, like water, Gatorade and Propel Water. You may also have black coffee and tea (no cream or milk).    Nothing by mouth within 2 hours of surgery. This includes gum, candy and breath mints.    If you drink, stop drinking alcohol the night before surgery.    If your care team tells you to take medicine on the morning of surgery, it's okay to take it with a sip of water.  Preventing infection    Shower or bathe the night before and morning of your surgery. Follow the instructions your clinic gave you. (If no instructions, use regular soap.)    Don't shave or clip hair near your surgery site. We'll remove the hair if needed.    Don't smoke or vape the morning of surgery. You may chew nicotine gum up to 2 hours before surgery. A nicotine patch is okay.  ? Note: Some surgeries require you to completely quit smoking and nicotine. Check with your surgeon.    Your care team will make every effort to keep you safe from infection. We will:  ? Clean our hands often with soap and water (or an alcohol-based hand rub).  ? Clean the skin at your surgery site with a special soap that kills germs.  ? Give you a special gown to keep you warm. (Cold raises the risk of infection.)  ? Wear special hair covers, masks, gowns and gloves during surgery.  ? Give antibiotic medicine, if prescribed. Not all surgeries need antibiotics.  What to bring on the day of surgery    Photo ID and insurance card    Copy of your health care directive, if you have one    Glasses and hearing aides (bring cases)  ? You can't wear contacts during surgery    Inhaler and eye drops, if you use them (tell us about these when you arrive)    CPAP machine or breathing device, if you use them    A few personal items, if spending the night    If you have . . .  ? A pacemaker or ICD (cardiac defibrillator): Bring the ID card.  ? An implanted stimulator: Bring the remote control.  ? A legal guardian: Bring a copy of  the certified (court-stamped) guardianship papers.  Please remove any jewelry, including body piercings. Leave jewelry and other valuables at home.  If you're going home the day of surgery  Important: If you don't follow the rules below, we must cancel your surgery.     Arrange for someone to drive you home after surgery. You may not drive, take a taxi or take public transportation by yourself (unless you'll have local anesthesia only).    Arrange for a responsible adult to stay with you overnight. If you don't, we may keep you in the hospital overnight, and you may need to pay the costs yourself.  Questions?   If you have any questions for your care team, list them here: _________________________________________________________________________________________________________________________________________________________________________________________________________________________________________________________________________________________________________________________  For informational purposes only. Not to replace the advice of your health care provider. Copyright   2003, 2019 [employer]. All rights reserved. Clinically reviewed by Fabiola Acosta MD. [employer] [id number] - REV 4/20.

## 2021-02-09 ENCOUNTER — HOSPITAL PATHOLOGY (OUTPATIENT)
Dept: OTHER | Facility: CLINIC | Age: 61
End: 2021-02-09

## 2021-02-09 LAB
ANION GAP SERPL CALCULATED.3IONS-SCNC: 8 MMOL/L (ref 3–14)
BUN SERPL-MCNC: 12 MG/DL (ref 7–30)
CALCIUM SERPL-MCNC: 9.4 MG/DL (ref 8.5–10.1)
CHLORIDE SERPL-SCNC: 107 MMOL/L (ref 94–109)
CO2 SERPL-SCNC: 25 MMOL/L (ref 20–32)
CREAT SERPL-MCNC: 0.66 MG/DL (ref 0.52–1.04)
GFR SERPL CREATININE-BSD FRML MDRD: >90 ML/MIN/{1.73_M2}
GLUCOSE SERPL-MCNC: 153 MG/DL (ref 70–99)
POTASSIUM SERPL-SCNC: 3.6 MMOL/L (ref 3.4–5.3)
SODIUM SERPL-SCNC: 140 MMOL/L (ref 133–144)

## 2021-02-10 LAB — COPATH REPORT: NORMAL

## 2021-03-01 DIAGNOSIS — E11.65 UNCONTROLLED TYPE 2 DIABETES MELLITUS WITH HYPERGLYCEMIA (H): ICD-10-CM

## 2021-03-02 RX ORDER — SITAGLIPTIN 100 MG/1
TABLET, FILM COATED ORAL
Qty: 90 TABLET | Refills: 0 | Status: SHIPPED | OUTPATIENT
Start: 2021-03-02 | End: 2021-07-05

## 2021-03-05 ENCOUNTER — HOSPITAL PATHOLOGY (OUTPATIENT)
Dept: OTHER | Facility: CLINIC | Age: 61
End: 2021-03-05

## 2021-03-09 LAB — COPATH REPORT: NORMAL

## 2021-06-19 ENCOUNTER — HEALTH MAINTENANCE LETTER (OUTPATIENT)
Age: 61
End: 2021-06-19

## 2021-07-06 ENCOUNTER — HOSPITAL ENCOUNTER (OUTPATIENT)
Dept: MAMMOGRAPHY | Facility: CLINIC | Age: 61
Discharge: HOME | End: 2021-07-06
Attending: INTERNAL MEDICINE | Admitting: INTERNAL MEDICINE
Payer: OTHER GOVERNMENT

## 2021-07-06 DIAGNOSIS — Z12.31 VISIT FOR SCREENING MAMMOGRAM: ICD-10-CM

## 2021-07-06 PROCEDURE — 77067 SCR MAMMO BI INCL CAD: CPT | Mod: 52

## 2021-07-17 DIAGNOSIS — E11.65 UNCONTROLLED TYPE 2 DIABETES MELLITUS WITH HYPERGLYCEMIA (H): ICD-10-CM

## 2021-07-17 DIAGNOSIS — I10 HTN, GOAL BELOW 140/90: ICD-10-CM

## 2021-07-19 RX ORDER — LISINOPRIL 10 MG/1
TABLET ORAL
Qty: 45 TABLET | Refills: 0 | Status: SHIPPED | OUTPATIENT
Start: 2021-07-19 | End: 2021-10-12

## 2021-07-19 NOTE — TELEPHONE ENCOUNTER
Medication is being filled for 1 time refill only due to:  Future appointment scheduled   Next 5 appointments (look out 90 days)    Aug 27, 2021  7:00 AM  SHORT with Archana Chen MD  [facility] [address]  [facility] [address]  [phone]

## 2021-08-05 ENCOUNTER — LAB (OUTPATIENT)
Dept: LAB | Facility: CLINIC | Age: 61
End: 2021-08-05
Payer: OTHER GOVERNMENT

## 2021-08-05 DIAGNOSIS — E11.65 UNCONTROLLED TYPE 2 DIABETES MELLITUS WITH HYPERGLYCEMIA (H): ICD-10-CM

## 2021-08-05 LAB — HBA1C MFR BLD: 6.7 % (ref 0–5.6)

## 2021-08-05 PROCEDURE — 83036 HEMOGLOBIN GLYCOSYLATED A1C: CPT

## 2021-08-05 PROCEDURE — 36415 COLL VENOUS BLD VENIPUNCTURE: CPT

## 2021-08-27 ENCOUNTER — OFFICE VISIT (OUTPATIENT)
Dept: INTERNAL MEDICINE | Facility: CLINIC | Age: 61
End: 2021-08-27
Payer: OTHER GOVERNMENT

## 2021-08-27 VITALS
BODY MASS INDEX: 26.14 KG/M2 | DIASTOLIC BLOOD PRESSURE: 77 MMHG | OXYGEN SATURATION: 98 % | TEMPERATURE: 97.1 F | RESPIRATION RATE: 18 BRPM | WEIGHT: 156.9 LBS | HEIGHT: 65 IN | SYSTOLIC BLOOD PRESSURE: 126 MMHG | HEART RATE: 98 BPM

## 2021-08-27 DIAGNOSIS — I10 HTN, GOAL BELOW 140/90: ICD-10-CM

## 2021-08-27 DIAGNOSIS — C50.911 DUCTAL CARCINOMA OF RIGHT BREAST (H): Primary | ICD-10-CM

## 2021-08-27 DIAGNOSIS — E11.65 UNCONTROLLED TYPE 2 DIABETES MELLITUS WITH HYPERGLYCEMIA (H): ICD-10-CM

## 2021-08-27 DIAGNOSIS — E78.5 HYPERLIPIDEMIA LDL GOAL <100: ICD-10-CM

## 2021-08-27 PROBLEM — N92.1 METRORRHAGIA: Status: ACTIVE | Noted: 2021-08-27

## 2021-08-27 PROBLEM — E11.9 DIABETES MELLITUS (H): Status: ACTIVE | Noted: 2021-08-27

## 2021-08-27 PROBLEM — D64.9 ANEMIA: Status: ACTIVE | Noted: 2021-08-27

## 2021-08-27 PROBLEM — R03.0 FINDING OF ABOVE NORMAL BLOOD PRESSURE: Status: ACTIVE | Noted: 2021-08-27

## 2021-08-27 PROCEDURE — 99214 OFFICE O/P EST MOD 30 MIN: CPT | Performed by: INTERNAL MEDICINE

## 2021-08-27 RX ORDER — GLIMEPIRIDE 1 MG/1
1 TABLET ORAL
Qty: 30 TABLET | Refills: 3 | Status: SHIPPED | OUTPATIENT
Start: 2021-08-27 | End: 2021-12-28

## 2021-08-27 RX ORDER — LETROZOLE 2.5 MG/1
2.5 TABLET, FILM COATED ORAL DAILY
COMMUNITY
Start: 2021-08-27

## 2021-08-27 ASSESSMENT — MIFFLIN-ST. JEOR: SCORE: 1287.57

## 2021-08-27 ASSESSMENT — PATIENT HEALTH QUESTIONNAIRE - PHQ9: SUM OF ALL RESPONSES TO PHQ QUESTIONS 1-9: 2

## 2021-08-27 NOTE — PATIENT INSTRUCTIONS
Plan:  1. Continue Januvia and Metformin  2. Add Glimepiride 1 mg daily. Stop it if side effects.  3. If you do not tolerate Glimepiride, I recommend Tradjenta 5 mg daily.    If it is not covered, patient needs to call insurance and find out which equivalent is covered.    4. Please make a lab appointment for fasting labs  In 3 months  5. Please make an appointment few days after the labs for ANNUAL EXAM  Be aware that sometimes it takes more than 3-4 weeks to find an appointment.   It would be advisable to schedule the appointment far in advance so you get get the care and the refills in time.

## 2021-08-27 NOTE — PROGRESS NOTES
Dr Salazar's note      Patient's instructions / PLAN:                                                        Plan:  1. Continue Januvia and Metformin  2. Add Glimepiride 1 mg daily. Stop it if side effects.  3. If you do not tolerate Glimepiride, I recommend Tradjenta 5 mg daily.    If it is not covered, patient needs to call insurance and find out which equivalent is covered.    4. Please make a lab appointment for fasting labs  In 3 months  5. Please make an appointment few days after the labs for ANNUAL EXAM      ASSESSMENT & PLAN:                                                      (C50.911) Ductal carcinoma of right breast (H)  (primary encounter diagnosis)  Comment: stable   Plan: letrozole (FEMARA) 2.5 MG tablet            (E11.65) DM 2, no insulin (H) #  Comment: Controlled    Discussed  About Tradjenta.   Plan: Hemoglobin A1c, CK total, Comprehensive         metabolic panel, Lipid panel reflex to direct         LDL Fasting, Albumin Random Urine Quantitative         with Creat Ratio, TSH with free T4 reflex, CBC         with platelets, glimepiride (AMARYL) 1 MG         tablet            (I10) HTN, goal below 140/90 #  Comment: Controlled    Plan: Hemoglobin A1c, CK total, Comprehensive         metabolic panel, Lipid panel reflex to direct         LDL Fasting, Albumin Random Urine Quantitative         with Creat Ratio, TSH with free T4 reflex, CBC         with platelets            (E78.5) Hyperlipidemia LDL goal <100 #  Comment: Controlled    Plan: Hemoglobin A1c, CK total, Comprehensive         metabolic panel, Lipid panel reflex to direct         LDL Fasting, Albumin Random Urine Quantitative         with Creat Ratio, TSH with free T4 reflex, CBC         with platelets               Chief complaint:                                                      Follow up chronic medical problems      SUBJECTIVE:                                                    History of present illness:      She has tried  Glipizide 2.5 in the past but we stopped it because it made her feel hungry     Lab Results   Component Value Date    A1C 6.7 08/05/2021    A1C 7.0 02/08/2021    A1C 6.6 10/01/2020    A1C 6.8 12/04/2019    A1C 7.5 05/02/2019    A1C 8.2 01/22/2019        Breast Ca -- doing well. She was changed from Tamoxifen to Letrozole.      Hyperlipidemia Follow-Up      Are you regularly taking any medication or supplement to lower your cholesterol?   Yes- Atorvastatin    Are you having muscle aches or other side effects that you think could be caused by your cholesterol lowering medication?  Yes- Lane  no longer takes medication, states it makes her feel bad.    Hypertension Follow-up      Do you check your blood pressure regularly outside of the clinic? No     Are you following a low salt diet? Yes    Are your blood pressures ever more than 140 on the top number (systolic) OR more   than 90 on the bottom number (diastolic), for example 140/90? No      How many servings of fruits and vegetables do you eat daily?  0-1    On average, how many sweetened beverages do you drink each day (Examples: soda, juice, sweet tea, etc.  Do NOT count diet or artificially sweetened beverages)?   1    How many days per week do you exercise enough to make your heart beat faster? 3 or less    How many minutes a day do you exercise enough to make your heart beat faster? 9 or less    How many days per week do you miss taking your medication? 0    Diabetes Follow-up      How often are you checking your blood sugar? Not at all    What concerns do you have today about your diabetes? None     Do you have any of these symptoms? (Select all that apply)  No numbness or tingling in feet.  No redness, sores or blisters on feet.  No complaints of excessive thirst.  No reports of blurry vision.  No significant changes to weight.    Have you had a diabetic eye exam in the last 12 months? No        BP Readings from Last 2 Encounters:   02/08/21 138/80  "  10/01/20 125/82     Hemoglobin A1C (%)   Date Value   08/05/2021 6.7 (H)   02/08/2021 7.0 (H)   10/01/2020 6.6 (H)     LDL Cholesterol Calculated (mg/dL)   Date Value   10/01/2020 107 (H)   02/18/2019 89       Review of Systems:                                                      ROS: negative for fever, chills, cough, wheezes, chest pain, shortness of breath, vomiting, abdominal pain, leg swelling       OBJECTIVE:             Physical exam:  Blood pressure 126/77, pulse 98, temperature 97.1  F (36.2  C), temperature source Tympanic, resp. rate 18, height 1.651 m (5' 5\"), weight 71.2 kg (156 lb 14.4 oz), last menstrual period 07/18/2017, SpO2 98 %, not currently breastfeeding.     NAD, appears comfortable    PMHx: reviewed  Past Medical History:   Diagnosis Date     Anxiety      Breast cancer (H)      Cancer (H)      Diabetes (H)      History of blood transfusion      Hyperlipidemia LDL goal <100      Hypertension      Papanicolaou smear of cervix with low grade squamous intraepithelial lesion (LGSIL) 08/18/2017 8/18/17 LSIL/Neg HPV. Plan: cotest in 1 year      PSHx: reviewed  Past Surgical History:   Procedure Laterality Date     BIOPSY       BREAST SURGERY       COLONOSCOPY N/A 3/11/2019    Procedure: COLONOSCOPY;  Surgeon: Nam Shah MD;  Location:  GI     DILATION AND CURETTAGE, OPERATIVE HYSTEROSCOPY, COMBINED N/A 5/18/2018    Procedure: COMBINED DILATION AND CURETTAGE, OPERATIVE HYSTEROSCOPY;  Hysteroscopy, Dilation and Curettage;  Surgeon: Lashawn Hernandez MD;  Location: [facility] OR     GYN SURGERY       MASTECTOMY          Meds: reviewed  Current Outpatient Medications   Medication Sig Dispense Refill     apremilast (OTEZLA) 30 MG tablet Take 1 tablet (30 mg) by mouth 2 times daily       atorvastatin (LIPITOR) 20 MG tablet Take 1 tablet (20 mg) by mouth daily 90 tablet 0     JANUVIA 100 MG tablet TAKE 1 TABLET DAILY 90 tablet 0     lisinopril (ZESTRIL) 10 MG tablet Take 1/2 (one-half) tablet " by mouth once daily 45 tablet 0     metFORMIN (GLUCOPHAGE) 1000 MG tablet TAKE 1 TABLET (1000 MG) BY MOUTH TWICE DAILY WITH MEALS 180 tablet 0     Venlafaxine HCl (EFFEXOR PO) Take 50 mg by mouth daily         Soc Hx: reviewed  Fam Hx: reviewed          Archana Salazar MD  Internal Medicine

## 2021-10-12 DIAGNOSIS — E11.65 UNCONTROLLED TYPE 2 DIABETES MELLITUS WITH HYPERGLYCEMIA (H): ICD-10-CM

## 2021-10-12 DIAGNOSIS — I10 HTN, GOAL BELOW 140/90: ICD-10-CM

## 2021-10-12 RX ORDER — LISINOPRIL 10 MG/1
TABLET ORAL
Qty: 45 TABLET | Refills: 0 | Status: SHIPPED | OUTPATIENT
Start: 2021-10-12 | End: 2022-11-03

## 2021-12-27 ENCOUNTER — TELEPHONE (OUTPATIENT)
Dept: INTERNAL MEDICINE | Facility: CLINIC | Age: 61
End: 2021-12-27
Payer: OTHER GOVERNMENT

## 2021-12-27 DIAGNOSIS — E11.65 UNCONTROLLED TYPE 2 DIABETES MELLITUS WITH HYPERGLYCEMIA (H): ICD-10-CM

## 2021-12-27 NOTE — TELEPHONE ENCOUNTER
Patient needs refills of metformin and glimepiride.   Needs sent to [facility] as [facility] is no longer accepting [facility].  Please [facility] both to [facility]      Rosalba Mendez

## 2021-12-28 RX ORDER — GLIMEPIRIDE 1 MG/1
1 TABLET ORAL
Qty: 30 TABLET | Refills: 0 | Status: SHIPPED | OUTPATIENT
Start: 2021-12-28 | End: 2022-02-15 | Stop reason: DRUGHIGH

## 2021-12-29 NOTE — TELEPHONE ENCOUNTER
Patient due for appointment in Nov 2021  Please help the patient to schedule appointment so I can give further refills.   FYI:  recently in the hospital     Aug LOV Plan:  1. Continue Januvia and Metformin  2. Add Glimepiride 1 mg daily. Stop it if side effects.  3. If you do not tolerate Glimepiride, I recommend Tradjenta 5 mg daily.    If it is not covered, patient needs to call insurance and find out which equivalent is covered.    4. Please make a lab appointment for fasting labs  In 3 months  5. Please make an appointment few days after the labs for ANNUAL EXAM

## 2022-01-05 ENCOUNTER — TRANSFERRED RECORDS (OUTPATIENT)
Dept: HEALTH INFORMATION MANAGEMENT | Facility: CLINIC | Age: 62
End: 2022-01-05
Payer: OTHER GOVERNMENT

## 2022-01-16 ENCOUNTER — LAB (OUTPATIENT)
Dept: URGENT CARE | Facility: URGENT CARE | Age: 62
End: 2022-01-16
Attending: FAMILY MEDICINE
Payer: OTHER GOVERNMENT

## 2022-01-16 DIAGNOSIS — Z20.822 SUSPECTED 2019 NOVEL CORONAVIRUS INFECTION: ICD-10-CM

## 2022-01-16 PROCEDURE — U0003 INFECTIOUS AGENT DETECTION BY NUCLEIC ACID (DNA OR RNA); SEVERE ACUTE RESPIRATORY SYNDROME CORONAVIRUS 2 (SARS-COV-2) (CORONAVIRUS DISEASE [COVID-19]), AMPLIFIED PROBE TECHNIQUE, MAKING USE OF HIGH THROUGHPUT TECHNOLOGIES AS DESCRIBED BY CMS-2020-01-R: HCPCS

## 2022-01-16 PROCEDURE — U0005 INFEC AGEN DETEC AMPLI PROBE: HCPCS

## 2022-01-17 LAB — SARS-COV-2 RNA RESP QL NAA+PROBE: NEGATIVE

## 2022-01-29 ENCOUNTER — HEALTH MAINTENANCE LETTER (OUTPATIENT)
Age: 62
End: 2022-01-29

## 2022-02-08 ENCOUNTER — LAB (OUTPATIENT)
Dept: LAB | Facility: CLINIC | Age: 62
End: 2022-02-08
Payer: OTHER GOVERNMENT

## 2022-02-08 DIAGNOSIS — E11.65 UNCONTROLLED TYPE 2 DIABETES MELLITUS WITH HYPERGLYCEMIA (H): ICD-10-CM

## 2022-02-08 DIAGNOSIS — E78.5 HYPERLIPIDEMIA LDL GOAL <100: ICD-10-CM

## 2022-02-08 DIAGNOSIS — I10 HTN, GOAL BELOW 140/90: ICD-10-CM

## 2022-02-08 LAB
ALBUMIN SERPL-MCNC: 3.6 G/DL (ref 3.4–5)
ALP SERPL-CCNC: 83 U/L (ref 40–150)
ALT SERPL W P-5'-P-CCNC: 30 U/L (ref 0–50)
ANION GAP SERPL CALCULATED.3IONS-SCNC: 7 MMOL/L (ref 3–14)
AST SERPL W P-5'-P-CCNC: 14 U/L (ref 0–45)
BILIRUB SERPL-MCNC: 0.2 MG/DL (ref 0.2–1.3)
BUN SERPL-MCNC: 16 MG/DL (ref 7–30)
CALCIUM SERPL-MCNC: 9.4 MG/DL (ref 8.5–10.1)
CHLORIDE BLD-SCNC: 110 MMOL/L (ref 94–109)
CHOLEST SERPL-MCNC: 265 MG/DL
CK SERPL-CCNC: 63 U/L (ref 30–225)
CO2 SERPL-SCNC: 24 MMOL/L (ref 20–32)
CREAT SERPL-MCNC: 0.6 MG/DL (ref 0.52–1.04)
ERYTHROCYTE [DISTWIDTH] IN BLOOD BY AUTOMATED COUNT: 13.2 % (ref 10–15)
FASTING STATUS PATIENT QL REPORTED: YES
GFR SERPL CREATININE-BSD FRML MDRD: >90 ML/MIN/1.73M2
GLUCOSE BLD-MCNC: 141 MG/DL (ref 70–99)
HBA1C MFR BLD: 7 % (ref 0–5.6)
HCT VFR BLD AUTO: 40 % (ref 35–47)
HDLC SERPL-MCNC: 63 MG/DL
HGB BLD-MCNC: 12.9 G/DL (ref 11.7–15.7)
LDLC SERPL CALC-MCNC: 157 MG/DL
MCH RBC QN AUTO: 29.1 PG (ref 26.5–33)
MCHC RBC AUTO-ENTMCNC: 32.3 G/DL (ref 31.5–36.5)
MCV RBC AUTO: 90 FL (ref 78–100)
NONHDLC SERPL-MCNC: 202 MG/DL
PLATELET # BLD AUTO: 257 10E3/UL (ref 150–450)
POTASSIUM BLD-SCNC: 4.1 MMOL/L (ref 3.4–5.3)
PROT SERPL-MCNC: 7 G/DL (ref 6.8–8.8)
RBC # BLD AUTO: 4.44 10E6/UL (ref 3.8–5.2)
SODIUM SERPL-SCNC: 141 MMOL/L (ref 133–144)
TRIGL SERPL-MCNC: 225 MG/DL
TSH SERPL DL<=0.005 MIU/L-ACNC: 1.69 MU/L (ref 0.4–4)
WBC # BLD AUTO: 4.2 10E3/UL (ref 4–11)

## 2022-02-08 PROCEDURE — 82550 ASSAY OF CK (CPK): CPT

## 2022-02-08 PROCEDURE — 85027 COMPLETE CBC AUTOMATED: CPT

## 2022-02-08 PROCEDURE — 83036 HEMOGLOBIN GLYCOSYLATED A1C: CPT

## 2022-02-08 PROCEDURE — 84443 ASSAY THYROID STIM HORMONE: CPT

## 2022-02-08 PROCEDURE — 36415 COLL VENOUS BLD VENIPUNCTURE: CPT

## 2022-02-08 PROCEDURE — 80061 LIPID PANEL: CPT

## 2022-02-08 PROCEDURE — 80053 COMPREHEN METABOLIC PANEL: CPT

## 2022-02-08 PROCEDURE — 82043 UR ALBUMIN QUANTITATIVE: CPT

## 2022-02-09 LAB
CREAT UR-MCNC: 179 MG/DL
MICROALBUMIN UR-MCNC: 18 MG/L
MICROALBUMIN/CREAT UR: 10.06 MG/G CR (ref 0–25)

## 2022-02-15 ENCOUNTER — OFFICE VISIT (OUTPATIENT)
Dept: INTERNAL MEDICINE | Facility: CLINIC | Age: 62
End: 2022-02-15
Payer: OTHER GOVERNMENT

## 2022-02-15 VITALS
TEMPERATURE: 98.4 F | HEIGHT: 65 IN | RESPIRATION RATE: 16 BRPM | HEART RATE: 96 BPM | BODY MASS INDEX: 25.83 KG/M2 | WEIGHT: 155 LBS | DIASTOLIC BLOOD PRESSURE: 70 MMHG | SYSTOLIC BLOOD PRESSURE: 130 MMHG | OXYGEN SATURATION: 96 %

## 2022-02-15 DIAGNOSIS — I10 HTN, GOAL BELOW 140/90: ICD-10-CM

## 2022-02-15 DIAGNOSIS — E11.65 UNCONTROLLED TYPE 2 DIABETES MELLITUS WITH HYPERGLYCEMIA (H): ICD-10-CM

## 2022-02-15 DIAGNOSIS — C50.911 DUCTAL CARCINOMA OF RIGHT BREAST (H): ICD-10-CM

## 2022-02-15 DIAGNOSIS — Z00.00 ROUTINE GENERAL MEDICAL EXAMINATION AT A HEALTH CARE FACILITY: Primary | ICD-10-CM

## 2022-02-15 DIAGNOSIS — E78.5 HYPERLIPIDEMIA LDL GOAL <100: ICD-10-CM

## 2022-02-15 PROCEDURE — 99396 PREV VISIT EST AGE 40-64: CPT | Performed by: INTERNAL MEDICINE

## 2022-02-15 PROCEDURE — 99214 OFFICE O/P EST MOD 30 MIN: CPT | Mod: 25 | Performed by: INTERNAL MEDICINE

## 2022-02-15 RX ORDER — METFORMIN HCL 500 MG
1000 TABLET, EXTENDED RELEASE 24 HR ORAL 2 TIMES DAILY WITH MEALS
Qty: 180 TABLET | Refills: 1 | Status: SHIPPED | OUTPATIENT
Start: 2022-02-15 | End: 2022-11-03

## 2022-02-15 RX ORDER — ROSUVASTATIN CALCIUM 10 MG/1
10 TABLET, COATED ORAL DAILY
Qty: 90 TABLET | Refills: 1 | Status: SHIPPED | OUTPATIENT
Start: 2022-02-15 | End: 2022-05-31 | Stop reason: DRUGHIGH

## 2022-02-15 RX ORDER — GLIMEPIRIDE 2 MG/1
2 TABLET ORAL
Qty: 90 TABLET | Refills: 1 | Status: SHIPPED | OUTPATIENT
Start: 2022-02-15 | End: 2022-05-31

## 2022-02-15 ASSESSMENT — ENCOUNTER SYMPTOMS
MYALGIAS: 0
SORE THROAT: 0
ARTHRALGIAS: 0
DYSURIA: 0
NAUSEA: 0
HEADACHES: 0
HEMATURIA: 0
CHILLS: 0
DIARRHEA: 0
HEMATOCHEZIA: 0
PALPITATIONS: 0
SHORTNESS OF BREATH: 0
FEVER: 0
DIZZINESS: 0
BREAST MASS: 0
NERVOUS/ANXIOUS: 0
ABDOMINAL PAIN: 0
PARESTHESIAS: 0
COUGH: 0
JOINT SWELLING: 0
HEARTBURN: 0
FREQUENCY: 0
CONSTIPATION: 0
EYE PAIN: 0
WEAKNESS: 0

## 2022-02-15 ASSESSMENT — MIFFLIN-ST. JEOR: SCORE: 1277.92

## 2022-02-15 NOTE — PROGRESS NOTES
Dr Salazar's note    Patient's instructions / PLAN:                                                        Plan:  1. Increase glimepiride to 2 mg daily  2. Metformin same dose but extended release  3. stop Atorvastatin  4. Start Rosuvastatin 10 mg daily at bed time -- for cholesterol   5. Please make a lab appointment for fasting labs in 3 months  6. Please make an appointment few days after the labs to discuss about the results. -- video         ASSESSMENT & PLAN:                                                      (Z00.00) Routine general medical examination at a health care facility  (primary encounter diagnosis)  Comment:   Plan: Hemoglobin A1c, CK total, Lipid panel reflex to        direct LDL Fasting, Comprehensive metabolic         panel            (E11.65) DM 2, no insulin (H) #  Comment:   Plan: glimepiride (AMARYL) 2 MG tablet, metFORMIN         (GLUCOPHAGE-XR) 500 MG 24 hr tablet, Hemoglobin        A1c, CK total, Lipid panel reflex to direct LDL        Fasting, Comprehensive metabolic panel            (C50.911) Ductal carcinoma of right breast (H)  Comment: stable   Plan: Hemoglobin A1c, CK total, Lipid panel reflex to        direct LDL Fasting, Comprehensive metabolic         panel            (I10) HTN, goal below 140/90 #  Comment: Controlled    Plan: Hemoglobin A1c, CK total, Lipid panel reflex to        direct LDL Fasting, Comprehensive metabolic         panel            (E78.5) Hyperlipidemia LDL goal <100 #  Comment: Not controlled   Plan: rosuvastatin (CRESTOR) 10 MG tablet, Hemoglobin        A1c, CK total, Lipid panel reflex to direct LDL        Fasting, Comprehensive metabolic panel               Chief Complaint:                                                        Annual exam  Follow up chronic medical problems      SUBJECTIVE:                                                    History of present illness     We reviewed the chronic medical problems as above.   I reviewed the recent tests  results in Epic       DM -- A1c 7.0 <-- 6.7    HLip -- she doesn't take the Lipitor. It upset her stomach     ROS:     See below       PMHx: - reviewed  Past Medical History:   Diagnosis Date     Anxiety      Breast cancer (H)      Cancer (H)      Diabetes (H)      History of blood transfusion      Hyperlipidemia LDL goal <100      Hypertension      Papanicolaou smear of cervix with low grade squamous intraepithelial lesion (LGSIL) 2017 LSIL/Neg HPV. Plan: cotest in 1 year       PSHx: reviewed  Past Surgical History:   Procedure Laterality Date     BIOPSY       BREAST SURGERY       COLONOSCOPY N/A 3/11/2019    Procedure: COLONOSCOPY;  Surgeon: Nam Shah MD;  Location: [facility]     DILATION AND CURETTAGE, OPERATIVE HYSTEROSCOPY, COMBINED N/A 2018    Procedure: COMBINED DILATION AND CURETTAGE, OPERATIVE HYSTEROSCOPY;  Hysteroscopy, Dilation and Curettage;  Surgeon: Lashawn Hernandez MD;  Location: [facility] OR     GYN SURGERY       MASTECTOMY          Soc Hx: No daily alcohol, no smoking  Social History     Socioeconomic History     Marital status:      Spouse name: Not on file     Number of children: Not on file     Years of education: Not on file     Highest education level: Not on file   Occupational History     Not on file   Tobacco Use     Smoking status: Former Smoker     Types: Cigarettes     Quit date: 8/3/1997     Years since quittin.5     Smokeless tobacco: Never Used   Vaping Use     Vaping Use: Never used   Substance and Sexual Activity     Alcohol use: Yes     Comment: socially     Drug use: No     Sexual activity: Yes     Partners: Male   Other Topics Concern     Parent/sibling w/ CABG, MI or angioplasty before 65F 55M? Not Asked   Social History Narrative     Not on file     Social Determinants of Health     Financial Resource Strain: Not on file   Food Insecurity: Not on file   Transportation Needs: Not on file   Physical Activity: Not on file   Stress: Not on file  "  Social Connections: Not on file   Intimate Partner Violence: Not on file   Housing Stability: Not on file        Fam Hx: reviewed  Family History   Problem Relation Age of Onset     Other - See Comments Mother         hysterectomy for benign     Diabetes Father          during bilateral leg amputation      Myocardial Infarction Maternal Grandmother 40     Kidney Disease Paternal Grandmother         On dialysis     Cardiac Sudden Death Paternal Grandfather 60         during angiogram     Diabetes Brother 40     Other - See Comments Sister         hysterectomy for benign     Breast Cancer No family hx of      Colon Cancer No family hx of          Screening: reviewed      All: reviewed    Meds: reviewed  Current Outpatient Medications   Medication Sig Dispense Refill     apremilast (OTEZLA) 30 MG tablet Take 1 tablet (30 mg) by mouth 2 times daily       glimepiride (AMARYL) 1 MG tablet Take 1 tablet (1 mg) by mouth every morning (before breakfast) -- For further refills patient needs appointment 30 tablet 0     JANUVIA 100 MG tablet TAKE 1 TABLET DAILY 90 tablet 3     letrozole (FEMARA) 2.5 MG tablet Take 1 tablet (2.5 mg) by mouth daily       metFORMIN (GLUCOPHAGE) 1000 MG tablet TAKE 1 TABLET (1000 MG) BY MOUTH TWICE DAILY WITH MEALS -- For further refills patient needs appointment 60 tablet 0     Venlafaxine HCl (EFFEXOR PO) Take 50 mg by mouth daily       atorvastatin (LIPITOR) 20 MG tablet Take 1 tablet (20 mg) by mouth daily 90 tablet 0     lisinopril (ZESTRIL) 10 MG tablet Take 1/2 (one-half) tablet by mouth once daily 45 tablet 0       OBJECTIVE:                                                    Physical Exam :  Blood pressure 130/70, pulse 96, temperature 98.4  F (36.9  C), temperature source Oral, resp. rate 16, height 1.657 m (5' 5.25\"), weight 70.3 kg (155 lb), last menstrual period 2017, SpO2 96 %, not currently breastfeeding.     NAD, appears comfortable  Skin clear, no rashes  Neck: " supple, no JVD,  no thyroidmegaly  Lymph nodes non palpable in the cervical, supraclavicular axillaries,   Chest: clear to auscultation with good respiratory effort  Cardiac: S1S2, RRR, no mgr appreciated  Abdomen: soft, not tender, not distended, audible bowel sound, no hepatosplenomegaly, no palpable masses, no abdominal bruits  Extremities: no cyanosis, clubbing or edema.   Neuro: A, Ox3, no focal signs.  Breast exam deferred - done w [facility]    Pelvic exam: deferred, no symptoms, no hx of abnormal pap         Archana Salazar MD  Internal Medicine          SUBJECTIVE:   CC: Cheryl Patel is an 61 year old woman who presents for preventive health visit.         Patient has been advised of split billing requirements and indicates understanding: Yes  Healthy Habits:     Getting at least 3 servings of Calcium per day:  NO    Bi-annual eye exam:  NO    Dental care twice a year:  NO    Sleep apnea or symptoms of sleep apnea:  None    Diet:  Diabetic and Breakfast skipped    Frequency of exercise:  None    Taking medications regularly:  Yes    Medication side effects:  None    PHQ-2 Total Score: 0    Additional concerns today:  No              Today's PHQ-2 Score:   PHQ-2 (  [employer]) 2/15/2022   Q1: Little interest or pleasure in doing things 0   Q2: Feeling down, depressed or hopeless 0   PHQ-2 Score 0   PHQ-2 Total Score (12-17 Years)- Positive if 3 or more points; Administer PHQ-A if positive -   Q1: Little interest or pleasure in doing things Not at all   Q2: Feeling down, depressed or hopeless Not at all   PHQ-2 Score 0       Abuse: Current or Past (Physical, Sexual or Emotional) - No  Do you feel safe in your environment? Yes        Social History     Tobacco Use     Smoking status: Former Smoker     Types: Cigarettes     Quit date: 8/3/1997     Years since quittin.5     Smokeless tobacco: Never Used   Substance Use Topics     Alcohol use: Yes     Comment: socially         Alcohol Use 2/15/2022    Prescreen: >3 drinks/day or >7 drinks/week? No   Prescreen: >3 drinks/day or >7 drinks/week? -       Reviewed orders with patient.  Reviewed health maintenance and updated orders accordingly - Yes  Labs reviewed in EPIC    Breast Cancer Screening:    FHS-7:   Breast CA Risk Assessment (FHS-7) 2/15/2022   Did any of your first-degree relatives have breast or ovarian cancer? No   Did any of your relatives have bilateral breast cancer? No   Did any man in your family have breast cancer? No   Did any woman in your family have breast and ovarian cancer? Yes   Did any woman in your family have breast cancer before age 50 y? Yes   Do you have 2 or more relatives with breast and/or ovarian cancer? No   Do you have 2 or more relatives with breast and/or bowel cancer? No         Pertinent mammograms are reviewed under the imaging tab.    History of abnormal Pap smear:   PAP / HPV Latest Ref Rng & Units 2/25/2019 8/18/2017   PAP (Historical) - NIL LSIL(A)   HPV16 NEG:Negative Negative Negative   HPV18 NEG:Negative Negative Negative   HRHPV NEG:Negative Negative Negative     Reviewed and updated as needed this visit by clinical staff  Tobacco  Allergies  Meds   Med Hx  Surg Hx  Fam Hx  Soc Hx       Reviewed and updated as needed this visit by Provider                   Review of Systems   Constitutional: Negative for chills and fever.   HENT: Negative for congestion, ear pain, hearing loss and sore throat.    Eyes: Negative for pain and visual disturbance.   Respiratory: Negative for cough and shortness of breath.    Cardiovascular: Negative for chest pain, palpitations and peripheral edema.   Gastrointestinal: Negative for abdominal pain, constipation, diarrhea, heartburn, hematochezia and nausea.   Breasts:  Negative for tenderness, breast mass and discharge.   Genitourinary: Negative for dysuria, frequency, genital sores, hematuria, pelvic pain, urgency and vaginal discharge.   Musculoskeletal: Negative for  "arthralgias, joint swelling and myalgias.   Skin: Negative for rash.   Neurological: Negative for dizziness, weakness, headaches and paresthesias.   Psychiatric/Behavioral: Negative for mood changes. The patient is not nervous/anxious.        Patient has been advised of split billing requirements and indicates understanding: Yes ..desk     COUNSELING:  Reviewed preventive health counseling, as reflected in patient instructions       Regular exercise       Healthy diet/nutrition    Estimated body mass index is 25.6 kg/m  as calculated from the following:    Height as of this encounter: 1.657 m (5' 5.25\").    Weight as of this encounter: 70.3 kg (155 lb).    Weight management plan: Discussed healthy diet and exercise guidelines    She reports that she quit smoking about 24 years ago. Her smoking use included cigarettes. She has never used smokeless tobacco.      Counseling Resources:  ATP IV Guidelines  Pooled Cohorts Equation Calculator  Breast Cancer Risk Calculator  BRCA-Related Cancer Risk Assessment: FHS-7 Tool  FRAX Risk Assessment  ICSI Preventive Guidelines  Dietary Guidelines for Americans, 2010  USDA's MyPlate  ASA Prophylaxis  Lung CA Screening    Archana Chen MD  [facility]  "

## 2022-02-15 NOTE — PATIENT INSTRUCTIONS
Plan:  1. Increase glimepiride to 2 mg daily  2. Metformin same dose but extended release  3. stop Atorvastatin  4. Start Rosuvastatin 10 mg daily at bed time -- for cholesterol   5. Please make a lab appointment for fasting labs in 3 months  6. Please make an appointment few days after the labs to discuss about the results. -- video

## 2022-03-16 NOTE — ANESTHESIA PREPROCEDURE EVALUATION
Anesthesia Evaluation     . Pt has had prior anesthetic. Type: General    No history of anesthetic complications          ROS/MED HX    ENT/Pulmonary:       Neurologic:       Cardiovascular:     (+) Dyslipidemia, hypertension----. : . . . :. .       METS/Exercise Tolerance:     Hematologic:         Musculoskeletal:         GI/Hepatic:         Renal/Genitourinary:         Endo:     (+) type II DM .      Psychiatric:         Infectious Disease:         Malignancy:   (+) Malignancy History of Breast  Breast CA Remission status post Chemo.         Other:                     Physical Exam      Airway   Mallampati: III  TM distance: >3 FB  Neck ROM: full    Dental     Cardiovascular   Rhythm and rate: regular and normal      Pulmonary    breath sounds clear to auscultation                    Anesthesia Plan      History & Physical Review  History and physical reviewed and following examination; no interval change.    ASA Status:  3 .    NPO Status:  > 8 hours    Plan for General and LMA with Intravenous and Propofol induction. Maintenance will be Balanced.    PONV prophylaxis:  Ondansetron (or other 5HT-3) and Dexamethasone or Solumedrol       Postoperative Care  Postoperative pain management:  IV analgesics, Oral pain medications and Multi-modal analgesia.      Consents  Anesthetic plan, risks, benefits and alternatives discussed with:  Patient..                          .  
General

## 2022-05-10 ENCOUNTER — LAB (OUTPATIENT)
Dept: LAB | Facility: CLINIC | Age: 62
End: 2022-05-10
Payer: OTHER GOVERNMENT

## 2022-05-10 DIAGNOSIS — E11.65 UNCONTROLLED TYPE 2 DIABETES MELLITUS WITH HYPERGLYCEMIA (H): ICD-10-CM

## 2022-05-10 DIAGNOSIS — C50.911 DUCTAL CARCINOMA OF RIGHT BREAST (H): ICD-10-CM

## 2022-05-10 DIAGNOSIS — I10 HTN, GOAL BELOW 140/90: ICD-10-CM

## 2022-05-10 DIAGNOSIS — E78.5 HYPERLIPIDEMIA LDL GOAL <100: ICD-10-CM

## 2022-05-10 DIAGNOSIS — Z00.00 ROUTINE GENERAL MEDICAL EXAMINATION AT A HEALTH CARE FACILITY: ICD-10-CM

## 2022-05-10 LAB
ALBUMIN SERPL-MCNC: 3.9 G/DL (ref 3.4–5)
ALP SERPL-CCNC: 90 U/L (ref 40–150)
ALT SERPL W P-5'-P-CCNC: 34 U/L (ref 0–50)
ANION GAP SERPL CALCULATED.3IONS-SCNC: 3 MMOL/L (ref 3–14)
AST SERPL W P-5'-P-CCNC: 15 U/L (ref 0–45)
BILIRUB SERPL-MCNC: 0.3 MG/DL (ref 0.2–1.3)
BUN SERPL-MCNC: 18 MG/DL (ref 7–30)
CALCIUM SERPL-MCNC: 9.6 MG/DL (ref 8.5–10.1)
CHLORIDE BLD-SCNC: 107 MMOL/L (ref 94–109)
CHOLEST SERPL-MCNC: 211 MG/DL
CK SERPL-CCNC: 124 U/L (ref 30–225)
CO2 SERPL-SCNC: 29 MMOL/L (ref 20–32)
CREAT SERPL-MCNC: 0.68 MG/DL (ref 0.52–1.04)
FASTING STATUS PATIENT QL REPORTED: YES
GFR SERPL CREATININE-BSD FRML MDRD: >90 ML/MIN/1.73M2
GLUCOSE BLD-MCNC: 164 MG/DL (ref 70–99)
HBA1C MFR BLD: 7.2 % (ref 0–5.6)
HDLC SERPL-MCNC: 68 MG/DL
LDLC SERPL CALC-MCNC: 120 MG/DL
NONHDLC SERPL-MCNC: 143 MG/DL
POTASSIUM BLD-SCNC: 4 MMOL/L (ref 3.4–5.3)
PROT SERPL-MCNC: 7.2 G/DL (ref 6.8–8.8)
SODIUM SERPL-SCNC: 139 MMOL/L (ref 133–144)
TRIGL SERPL-MCNC: 115 MG/DL

## 2022-05-10 PROCEDURE — 36415 COLL VENOUS BLD VENIPUNCTURE: CPT

## 2022-05-10 PROCEDURE — 80061 LIPID PANEL: CPT

## 2022-05-10 PROCEDURE — 83036 HEMOGLOBIN GLYCOSYLATED A1C: CPT

## 2022-05-10 PROCEDURE — 80053 COMPREHEN METABOLIC PANEL: CPT

## 2022-05-10 PROCEDURE — 82550 ASSAY OF CK (CPK): CPT

## 2022-05-17 NOTE — PROGRESS NOTES
{PROVIDER CHARTING PREFERENCE:266355}    Jihan Luna is a 61 year old who presents for the following health issues {ACCOMPANIED BY STATEMENT (Optional):913257}  Patient is being seen to discuss labs and follow up.  HPI     Hyperlipidemia Follow-Up      Are you regularly taking any medication or supplement to lower your cholesterol?   Yes- rosuvastatin    Are you having muscle aches or other side effects that you think could be caused by your cholesterol lowering medication?  { :099497}    Hypertension Follow-up      Do you check your blood pressure regularly outside of the clinic? { :159532}     Are you following a low salt diet? { :235240}    Are your blood pressures ever more than 140 on the top number (systolic) OR more   than 90 on the bottom number (diastolic), for example 140/90? { :758507}      How many servings of fruits and vegetables do you eat daily?  { :437274}    On average, how many sweetened beverages do you drink each day (Examples: soda, juice, sweet tea, etc.  Do NOT count diet or artificially sweetened beverages)?   { 1-11:668187}    How many days per week do you exercise enough to make your heart beat faster? { :068394}    How many minutes a day do you exercise enough to make your heart beat faster? { :413542}    How many days per week do you miss taking your medication? {0-7 :970122}    {additonal problems for provider to add (Optional):130451}    Review of Systems   {ROS COMP (Optional):236697}      Objective    LMP 07/18/2017   There is no height or weight on file to calculate BMI.  Physical Exam   {Exam List (Optional):752060}    {Diagnostic Test Results (Optional):101175}    {AMBULATORY ATTESTATION (Optional):067658}

## 2022-05-22 DIAGNOSIS — E11.65 UNCONTROLLED TYPE 2 DIABETES MELLITUS WITH HYPERGLYCEMIA (H): ICD-10-CM

## 2022-05-31 ENCOUNTER — VIRTUAL VISIT (OUTPATIENT)
Dept: INTERNAL MEDICINE | Facility: CLINIC | Age: 62
End: 2022-05-31
Payer: OTHER GOVERNMENT

## 2022-05-31 DIAGNOSIS — E78.5 HYPERLIPIDEMIA LDL GOAL <100: ICD-10-CM

## 2022-05-31 DIAGNOSIS — I10 HTN, GOAL BELOW 140/90: ICD-10-CM

## 2022-05-31 DIAGNOSIS — E11.65 UNCONTROLLED TYPE 2 DIABETES MELLITUS WITH HYPERGLYCEMIA (H): Primary | ICD-10-CM

## 2022-05-31 PROCEDURE — 99214 OFFICE O/P EST MOD 30 MIN: CPT | Mod: 95 | Performed by: INTERNAL MEDICINE

## 2022-05-31 RX ORDER — ROSUVASTATIN CALCIUM 20 MG/1
20 TABLET, COATED ORAL DAILY
Qty: 90 TABLET | Refills: 1 | Status: SHIPPED | OUTPATIENT
Start: 2022-05-31 | End: 2022-11-03

## 2022-05-31 NOTE — PATIENT INSTRUCTIONS
Plan:  1. Stop the glimepiride  2. Continue Metformin the same dose  3. Continue Januvia, but you will stop it when you start Semaglutide or similar medication  4. Semaglutide 3 mg tablet daily or 0.25 mg weekly injection.    If it is not covered, patient needs to call insurance and find out which equivalent is covered.    5. Please make a lab appointment for fasting labs  In September 6. Please make an appointment few days after the labs to discuss about the results. -- video is ok  7. Once you start the new medication schedule a video follow up appointment in 4-6 weeks

## 2022-05-31 NOTE — PROGRESS NOTES
Cheryl is a 61 year old who is being evaluated via a billable video visit.      How would you like to obtain your AVS? Mail a copy  If the video visit is dropped, the invitation should be resent by: Text to cell phone: [phone]  Will anyone else be joining your video visit? No        This is a VIDEO ( using Doximity)  encounter with the patient.       Location of the provider : office   Location of the patient : home      17:26  --- 17:46            Dr Salazar's note      Patient's instructions / PLAN:                                                        Plan:  1. Stop the glimepirode  2. Continue Metformin the same dose  3. Continue Januvia, but you will stop it when you start Semaglutide or similar medication  4. Semaglutide 3 mg tablet daily or 0.25 mg weekly injection.    If it is not covered, patient needs to call insurance and find out which equivalent is covered.    5. Please make a lab appointment for fasting labs  In September 6. Please make an appointment few days after the labs to discuss about the results. -- video is ok  7. Once you start the new medication schedule a video follow up appointment in 4-6 weeks         ASSESSMENT & PLAN:                                                      (E11.65) Uncontrolled type 2 diabetes mellitus with hyperglycemia (H)  (primary encounter diagnosis)  Comment: We discussed about the new meds, advantages and potential side effects. The patient will read also the info from the pharmacy and call back if questions.   Plan: Semaglutide 3 MG TABS, semaglutide (OZEMPIC) 2         MG/1.5ML SOPN pen            (E78.5) Hyperlipidemia LDL goal <100 #  Comment: Not controlled   Plan: rosuvastatin (CRESTOR) 20 MG tablet            (I10) HTN, goal below 140/90 #  Comment: not sure if controlled or not, she hasn't taken lisinopril   Plan:            Chief complaint:                                                      Follow up chronic medical problems       SUBJECTIVE:                                                     History of present illness:    HLip  -- tolerates Rosuvastatin 10 mg  -- <--157    DM  -- she didn't like glimepiride because it made her feel hungry  -- tolerates Metformin 1000 bid  -- A1c 7.2    LOV: Plan:  1. Increase glimepiride to 2 mg daily  2. Metformin same dose but extended release  3. stop Atorvastatin  4. Start Rosuvastatin 10 mg daily at bed time -- for cholesterol   5. Please make a lab appointment for fasting labs in 3 months  6. Please make an appointment few days after the labs to discuss about the results. -- video          Hyperlipidemia Follow-Up      Are you regularly taking any medication or supplement to lower your cholesterol?   Yes- Rosuvastatin    Are you having muscle aches or other side effects that you think could be caused by your cholesterol lowering medication?  Yes- muscle aches    Hypertension Follow-up      Do you check your blood pressure regularly outside of the clinic? No     Are you following a low salt diet? No    Are your blood pressures ever more than 140 on the top number (systolic) OR more   than 90 on the bottom number (diastolic), for example 140/90? No      How many servings of fruits and vegetables do you eat daily?  2-3    On average, how many sweetened beverages do you drink each day (Examples: soda, juice, sweet tea, etc.  Do NOT count diet or artificially sweetened beverages)?   1    How many days per week do you exercise enough to make your heart beat faster? 3 or less    How many minutes a day do you exercise enough to make your heart beat faster? 9 or less    How many days per week do you miss taking your medication? 0          OBJECTIVE:           An actual physical exam can't be done during phone visit   A limited exam can sometimes be performed by video visit   NAD      PMHx: reviewed  Past Medical History:   Diagnosis Date     Anxiety      Breast cancer (H)      Cancer (H)      Diabetes (H)      History of  blood transfusion      Hyperlipidemia LDL goal <100      Hypertension      Papanicolaou smear of cervix with low grade squamous intraepithelial lesion (LGSIL) 08/18/2017 8/18/17 LSIL/Neg HPV. Plan: cotest in 1 year      PSHx: reviewed  Past Surgical History:   Procedure Laterality Date     BIOPSY       BREAST SURGERY       COLONOSCOPY N/A 3/11/2019    Procedure: COLONOSCOPY;  Surgeon: Nam Shah MD;  Location: [facility]     DILATION AND CURETTAGE, OPERATIVE HYSTEROSCOPY, COMBINED N/A 5/18/2018    Procedure: COMBINED DILATION AND CURETTAGE, OPERATIVE HYSTEROSCOPY;  Hysteroscopy, Dilation and Curettage;  Surgeon: Lashawn Hernandez MD;  Location: [facility]     GYN SURGERY       MASTECTOMY          Meds: reviewed  Current Outpatient Medications   Medication Sig Dispense Refill     apremilast (OTEZLA) 30 MG tablet Take 1 tablet (30 mg) by mouth 2 times daily       glimepiride (AMARYL) 2 MG tablet Take 1 tablet (2 mg) by mouth daily (before lunch) 90 tablet 1     JANUVIA 100 MG tablet TAKE 1 TABLET DAILY 90 tablet 3     letrozole (FEMARA) 2.5 MG tablet Take 1 tablet (2.5 mg) by mouth daily       lisinopril (ZESTRIL) 10 MG tablet Take 1/2 (one-half) tablet by mouth once daily 45 tablet 0     metFORMIN (GLUCOPHAGE) 1000 MG tablet TAKE 1 TABLET TWICE A DAY WITH MEALS (FOR FURTHER REFILLS, YOU NEED AN APPOINTMENT) 180 tablet 0     metFORMIN (GLUCOPHAGE-XR) 500 MG 24 hr tablet Take 2 tablets (1,000 mg) by mouth 2 times daily (with meals) 180 tablet 1     rosuvastatin (CRESTOR) 10 MG tablet Take 1 tablet (10 mg) by mouth daily 90 tablet 1     Venlafaxine HCl (EFFEXOR PO) Take 50 mg by mouth daily         Soc Hx: reviewed  Fam Hx: reviewed          Archana Salazar MD  Internal Medicine

## 2022-06-08 ENCOUNTER — TELEPHONE (OUTPATIENT)
Dept: INTERNAL MEDICINE | Facility: CLINIC | Age: 62
End: 2022-06-08
Payer: OTHER GOVERNMENT

## 2022-06-08 NOTE — TELEPHONE ENCOUNTER
Central Prior Authorization Team   Phone: [phone]      EPA Denied: waiting on denial letter

## 2022-06-09 NOTE — TELEPHONE ENCOUNTER
Patient advised and will call her insurance to see what they may cover instead of [place]. KIMMIE Nelson R.N.

## 2022-06-09 NOTE — TELEPHONE ENCOUNTER
If it is not covered, patient needs to call insurance and find out which equivalent is covered.

## 2022-06-09 NOTE — TELEPHONE ENCOUNTER
PRIOR AUTHORIZATION DENIED    Medication: Semaglutide 3 MG TABS - EPA Denied     Denial Date: 6/8/2022    Denial Rational:      Appeal Information:

## 2022-06-21 ENCOUNTER — MYC MEDICAL ADVICE (OUTPATIENT)
Dept: INTERNAL MEDICINE | Facility: CLINIC | Age: 62
End: 2022-06-21
Payer: OTHER GOVERNMENT

## 2022-06-21 NOTE — TELEPHONE ENCOUNTER
Patient has let us know she would like to continue current diabetic regimen and not pursue semaglutide. Please discontinue prescriptions.    Thank you!

## 2022-07-18 ENCOUNTER — TELEPHONE (OUTPATIENT)
Dept: INTERNAL MEDICINE | Facility: CLINIC | Age: 62
End: 2022-07-18

## 2022-07-18 NOTE — TELEPHONE ENCOUNTER
Patient Quality Outreach    Patient is due for the following:   Diabetes -  Eye Exam    NEXT STEPS:   Schedule a office visit for eye exam    Type of outreach:    Phone, left message for patient/parent to call back.      Questions for provider review:    None     Sita Aquino

## 2022-07-27 ENCOUNTER — ANCILLARY PROCEDURE (OUTPATIENT)
Dept: BONE DENSITY | Facility: CLINIC | Age: 62
End: 2022-07-27
Attending: INTERNAL MEDICINE
Payer: OTHER GOVERNMENT

## 2022-07-27 DIAGNOSIS — C50.919 PRIMARY MALIGNANT NEOPLASM OF FEMALE BREAST (H): ICD-10-CM

## 2022-07-27 PROCEDURE — 77080 DXA BONE DENSITY AXIAL: CPT | Performed by: INTERNAL MEDICINE

## 2022-07-29 ENCOUNTER — HOSPITAL ENCOUNTER (OUTPATIENT)
Dept: MAMMOGRAPHY | Facility: CLINIC | Age: 62
Discharge: HOME | End: 2022-07-29
Attending: INTERNAL MEDICINE | Admitting: INTERNAL MEDICINE
Payer: OTHER GOVERNMENT

## 2022-07-29 DIAGNOSIS — Z12.31 VISIT FOR SCREENING MAMMOGRAM: ICD-10-CM

## 2022-07-29 PROCEDURE — 77067 SCR MAMMO BI INCL CAD: CPT | Mod: 52

## 2022-08-10 ENCOUNTER — TRANSFERRED RECORDS (OUTPATIENT)
Dept: INTERNAL MEDICINE | Facility: CLINIC | Age: 62
End: 2022-08-10

## 2022-10-24 ENCOUNTER — LAB (OUTPATIENT)
Dept: LAB | Facility: CLINIC | Age: 62
End: 2022-10-24
Payer: OTHER GOVERNMENT

## 2022-10-24 DIAGNOSIS — E11.65 UNCONTROLLED TYPE 2 DIABETES MELLITUS WITH HYPERGLYCEMIA (H): ICD-10-CM

## 2022-10-24 DIAGNOSIS — E78.5 HYPERLIPIDEMIA LDL GOAL <100: ICD-10-CM

## 2022-10-24 DIAGNOSIS — I10 HTN, GOAL BELOW 140/90: ICD-10-CM

## 2022-10-24 LAB
ALBUMIN SERPL-MCNC: 3.9 G/DL (ref 3.4–5)
ALP SERPL-CCNC: 103 U/L (ref 40–150)
ALT SERPL W P-5'-P-CCNC: 34 U/L (ref 0–50)
ANION GAP SERPL CALCULATED.3IONS-SCNC: 7 MMOL/L (ref 3–14)
AST SERPL W P-5'-P-CCNC: 17 U/L (ref 0–45)
BILIRUB SERPL-MCNC: 0.5 MG/DL (ref 0.2–1.3)
BUN SERPL-MCNC: 14 MG/DL (ref 7–30)
CALCIUM SERPL-MCNC: 9.8 MG/DL (ref 8.5–10.1)
CHLORIDE BLD-SCNC: 105 MMOL/L (ref 94–109)
CHOLEST SERPL-MCNC: 202 MG/DL
CK SERPL-CCNC: 52 U/L (ref 30–225)
CO2 SERPL-SCNC: 25 MMOL/L (ref 20–32)
CREAT SERPL-MCNC: 0.65 MG/DL (ref 0.52–1.04)
CREAT UR-MCNC: 242 MG/DL
FASTING STATUS PATIENT QL REPORTED: YES
GFR SERPL CREATININE-BSD FRML MDRD: >90 ML/MIN/1.73M2
GLUCOSE BLD-MCNC: 171 MG/DL (ref 70–99)
HBA1C MFR BLD: 7.8 % (ref 0–5.6)
HDLC SERPL-MCNC: 74 MG/DL
LDLC SERPL CALC-MCNC: 83 MG/DL
MICROALBUMIN UR-MCNC: 23 MG/L
MICROALBUMIN/CREAT UR: 9.5 MG/G CR (ref 0–25)
NONHDLC SERPL-MCNC: 128 MG/DL
POTASSIUM BLD-SCNC: 3.9 MMOL/L (ref 3.4–5.3)
PROT SERPL-MCNC: 7.5 G/DL (ref 6.8–8.8)
SODIUM SERPL-SCNC: 137 MMOL/L (ref 133–144)
TRIGL SERPL-MCNC: 227 MG/DL

## 2022-10-24 PROCEDURE — 36415 COLL VENOUS BLD VENIPUNCTURE: CPT

## 2022-10-24 PROCEDURE — 82043 UR ALBUMIN QUANTITATIVE: CPT

## 2022-10-24 PROCEDURE — 80061 LIPID PANEL: CPT

## 2022-10-24 PROCEDURE — 83036 HEMOGLOBIN GLYCOSYLATED A1C: CPT

## 2022-10-24 PROCEDURE — 82550 ASSAY OF CK (CPK): CPT

## 2022-10-24 PROCEDURE — 80053 COMPREHEN METABOLIC PANEL: CPT

## 2022-11-03 ENCOUNTER — OFFICE VISIT (OUTPATIENT)
Dept: INTERNAL MEDICINE | Facility: CLINIC | Age: 62
End: 2022-11-03
Payer: OTHER GOVERNMENT

## 2022-11-03 VITALS
WEIGHT: 159.8 LBS | HEIGHT: 65 IN | OXYGEN SATURATION: 96 % | HEART RATE: 109 BPM | RESPIRATION RATE: 18 BRPM | BODY MASS INDEX: 26.62 KG/M2 | SYSTOLIC BLOOD PRESSURE: 132 MMHG | DIASTOLIC BLOOD PRESSURE: 78 MMHG | TEMPERATURE: 96.1 F

## 2022-11-03 DIAGNOSIS — I10 HTN, GOAL BELOW 140/90: ICD-10-CM

## 2022-11-03 DIAGNOSIS — Z23 NEED FOR VACCINATION: ICD-10-CM

## 2022-11-03 DIAGNOSIS — E11.65 UNCONTROLLED TYPE 2 DIABETES MELLITUS WITH HYPERGLYCEMIA (H): ICD-10-CM

## 2022-11-03 DIAGNOSIS — Z11.4 SCREENING FOR HIV (HUMAN IMMUNODEFICIENCY VIRUS): ICD-10-CM

## 2022-11-03 DIAGNOSIS — E78.5 HYPERLIPIDEMIA LDL GOAL <100: ICD-10-CM

## 2022-11-03 DIAGNOSIS — Z23 HIGH PRIORITY FOR 2019-NCOV VACCINE: ICD-10-CM

## 2022-11-03 DIAGNOSIS — E11.65 UNCONTROLLED TYPE 2 DIABETES MELLITUS WITH HYPERGLYCEMIA (H): Primary | ICD-10-CM

## 2022-11-03 PROCEDURE — 90471 IMMUNIZATION ADMIN: CPT | Performed by: INTERNAL MEDICINE

## 2022-11-03 PROCEDURE — 90682 RIV4 VACC RECOMBINANT DNA IM: CPT | Performed by: INTERNAL MEDICINE

## 2022-11-03 PROCEDURE — 99214 OFFICE O/P EST MOD 30 MIN: CPT | Mod: 25 | Performed by: INTERNAL MEDICINE

## 2022-11-03 PROCEDURE — 0134A COVID-19,PF,MODERNA BIVALENT: CPT | Performed by: INTERNAL MEDICINE

## 2022-11-03 PROCEDURE — 91313 COVID-19,PF,MODERNA BIVALENT: CPT | Performed by: INTERNAL MEDICINE

## 2022-11-03 RX ORDER — ROSUVASTATIN CALCIUM 20 MG/1
20 TABLET, COATED ORAL DAILY
Qty: 90 TABLET | Refills: 1 | Status: SHIPPED | OUTPATIENT
Start: 2022-11-03 | End: 2023-07-11

## 2022-11-03 RX ORDER — LISINOPRIL 10 MG/1
TABLET ORAL
Qty: 45 TABLET | Refills: 1 | Status: SHIPPED | OUTPATIENT
Start: 2022-11-03 | End: 2024-03-05 | Stop reason: DRUGHIGH

## 2022-11-03 ASSESSMENT — PAIN SCALES - GENERAL: PAINLEVEL: NO PAIN (0)

## 2022-11-03 ASSESSMENT — PATIENT HEALTH QUESTIONNAIRE - PHQ9
SUM OF ALL RESPONSES TO PHQ QUESTIONS 1-9: 4
10. IF YOU CHECKED OFF ANY PROBLEMS, HOW DIFFICULT HAVE THESE PROBLEMS MADE IT FOR YOU TO DO YOUR WORK, TAKE CARE OF THINGS AT HOME, OR GET ALONG WITH OTHER PEOPLE: SOMEWHAT DIFFICULT
SUM OF ALL RESPONSES TO PHQ QUESTIONS 1-9: 4

## 2022-11-03 NOTE — PROGRESS NOTES
Dr Salazar's note      Patient's instructions / PLAN:                                                        Plan:  1. Add Jardiance 10 mg daily -- for diabetes  If you do not tolerate it, call and I will prescribe Glipizide   2. Continue the other meds, same doses for now.  3. Schedule ANNUAL EXAM after Feb 15  4. Please make a lab appointment for non fasting labs  Few days before        ( GLP 1A)  Glucagon-Like Peptide-1 Agonists -- class -- it helps with loosing weight too. Can't be combined with Januvia  1. SEMAGLUTIDE / OZEMPIC    2. LIRAGLUTIDE / VICTOZA  qw: 0.6 à 1.2 - 1.8 mg  3. DULAGLUTIDE / TRULICITY  qw 0.75 à 1.5 à .. max 4.5 mg     (SGLT2I) Sodium-Glucose Cotransporter 2 Inhibitors -- class   1. CANAGLIFLOZIN / INVOKANA   2. DAPAGLIFLOZIN / FARXIGA   3. EMPAGLIFLOZIN / JARDIANCE -- today prescription     ASSESSMENT & PLAN:                                                      (E11.65) DM 2, no insulin (H) #  (primary encounter diagnosis)  Comment: Not controlled  Insurance doesn't cover semaglutide  We discussed about the new meds, advantages and potential side effects. The patient will read also the info from the pharmacy and call back if questions.   Plan: empagliflozin (JARDIANCE) 10 MG TABS tablet,         Hemoglobin A1c            (I10) HTN, goal below 140/90 #  Comment: Controlled    Plan: Continue same meds, same doses for now     (E78.5) Hyperlipidemia LDL goal <100 #  Comment: Controlled    Plan: Continue same meds, same doses for now     (Z11.4) Screening for HIV (human immunodeficiency virus)  Comment:   Plan:     (Z23) Need for vaccination  Comment:   Plan: COVID-19,PF,MODERNA BIVALENT 18+Yrs            (Z23) High priority for 2019-nCoV vaccine  Comment:   Plan:        Chief complaint:                                                      Follow up chronic medical problems       SUBJECTIVE:                                                    History of present illness:    DM  -- A1c is going  up  -- Ozempic is not covered  -- takes max dose Januvia 100 + Metformin 2000 / day    Labs 2022 -- Discussed      Subjective   Cheryl is a 61 year old, presenting for the following health issues:  Diabetes      History of Present Illness       Diabetes:   She presents for follow up of diabetes.  She is checking home blood glucose a few times a month. She checks blood glucose after meals.  Blood glucose is never over 200 and never under 70. She is aware of hypoglycemia symptoms including shakiness. She has no concerns regarding her diabetes at this time.  She is having excessive thirst. The patient has not had a diabetic eye exam in the last 12 months.         She eats 0-1 servings of fruits and vegetables daily.She consumes 0 sweetened beverage(s) daily.She exercises with enough effort to increase her heart rate 9 or less minutes per day.  She exercises with enough effort to increase her heart rate 3 or less days per week. She is missing 1 dose(s) of medications per week.    Today's PHQ-9         PHQ-9 Total Score: 4    PHQ-9 Q9 Thoughts of better off dead/self-harm past 2 weeks :   Not at all    How difficult have these problems made it for you to do your work, take care of things at home, or get along with other people: Somewhat difficult       Hyperlipidemia Follow-Up      Are you regularly taking any medication or supplement to lower your cholesterol?   Yes- Rosuvastatin    Are you having muscle aches or other side effects that you think could be caused by your cholesterol lowering medication?  No    Hypertension Follow-up      Do you check your blood pressure regularly outside of the clinic? No     Are you following a low salt diet? Yes    Are your blood pressures ever more than 140 on the top number (systolic) OR more   than 90 on the bottom number (diastolic), for example 140/90? No    Review of Systems:                                                      ROS: negative for fever, chills, cough, wheezes,  "chest pain, shortness of breath, vomiting, abdominal pain, leg swelling       OBJECTIVE:             Physical exam:  Blood pressure 132/78, pulse 109, temperature (!) 96.1  F (35.6  C), temperature source Tympanic, resp. rate 18, height 1.657 m (5' 5.25\"), weight 72.5 kg (159 lb 12.8 oz), last menstrual period 07/18/2017, SpO2 96 %, not currently breastfeeding.       PMHx: reviewed  Past Medical History:   Diagnosis Date     Anxiety      Breast cancer (H)      Cancer (H)      Diabetes (H)      History of blood transfusion      Hyperlipidemia LDL goal <100      Hypertension      Papanicolaou smear of cervix with low grade squamous intraepithelial lesion (LGSIL) 08/18/2017 8/18/17 LSIL/Neg HPV. Plan: cotest in 1 year      PSHx: reviewed  Past Surgical History:   Procedure Laterality Date     BIOPSY       BREAST SURGERY       COLONOSCOPY N/A 3/11/2019    Procedure: COLONOSCOPY;  Surgeon: Nam Shah MD;  Location: [facility]     DILATION AND CURETTAGE, OPERATIVE HYSTEROSCOPY, COMBINED N/A 5/18/2018    Procedure: COMBINED DILATION AND CURETTAGE, OPERATIVE HYSTEROSCOPY;  Hysteroscopy, Dilation and Curettage;  Surgeon: Lashawn Hernandez MD;  Location: [facility]     GYN SURGERY       MASTECTOMY          Meds: reviewed  Current Outpatient Medications   Medication Sig Dispense Refill     apremilast (OTEZLA) 30 MG tablet Take 30 mg by mouth 2 times daily       JANUVIA 100 MG tablet TAKE 1 TABLET DAILY 90 tablet 1     letrozole (FEMARA) 2.5 MG tablet Take 1 tablet (2.5 mg) by mouth daily       lisinopril (ZESTRIL) 10 MG tablet Take 1/2 (one-half) tablet by mouth once daily 45 tablet 0     metFORMIN (GLUCOPHAGE) 1000 MG tablet TAKE 1 TABLET TWICE A DAY WITH MEALS (FOR FURTHER REFILLS, YOU NEED AN APPOINTMENT) 180 tablet 1     metFORMIN (GLUCOPHAGE-XR) 500 MG 24 hr tablet Take 2 tablets (1,000 mg) by mouth 2 times daily (with meals) 180 tablet 1     rosuvastatin (CRESTOR) 20 MG tablet Take 1 tablet (20 mg) by mouth daily " 90 tablet 1     Venlafaxine HCl (EFFEXOR PO) Take 50 mg by mouth daily         Soc Hx: reviewed  Fam Hx: reviewed      Chart documentation was completed, in part, with [employer] voice-recognition software. Even though reviewed, some grammatical, spelling, and word errors may remain.      Archana Salazar MD  Internal Medicine

## 2022-11-03 NOTE — PATIENT INSTRUCTIONS
Plan:  1. Add Jardiance 10 mg daily -- for diabetes  If you do not tolerate it, call and I will prescribe Glipizide   2. Continue the other meds, same doses for now.  3. Schedule ANNUAL EXAM after Feb 15  4. Please make a lab appointment for non fasting labs  Few days before        ( GLP 1A)  Glucagon-Like Peptide-1 Agonists -- class -- it helps with loosing weight too. Can't be combined with Januvia  1. SEMAGLUTIDE / OZEMPIC    2. LIRAGLUTIDE / VICTOZA  qw: 0.6 à 1.2 - 1.8 mg  3. DULAGLUTIDE / TRULICITY  qw 0.75 à 1.5 à .. max 4.5 mg     (SGLT2I) Sodium-Glucose Cotransporter 2 Inhibitors -- class   1. CANAGLIFLOZIN / INVOKANA   2. DAPAGLIFLOZIN / FARXIGA   3. EMPAGLIFLOZIN / JARDIANCE -- today prescription

## 2022-11-15 NOTE — TELEPHONE ENCOUNTER
11/15/2022    TELEHEALTH EVALUATION -- Audio/Visual (During YOMMP-02 public health emergency)    HPI:    Gopi Wilkes (:  1960) has requested an audio/video evaluation. Consent obtained per MA note, with attention to limitations inherent to a video visit for the following concern(s):    Follow up on multiple concerns. Has not been see in person for some time. Still needs an in person encounter to monitor blood pressure. Denies chest pain, shortness of breath, leg swelling, headache, vision changes and orthopnea    He has HLD. Continuing lipitor. Needs labs drawn    Has been following with psychiatry for hearing voices. Has been on multiple medications. Stopped them at some point. He has no SI/HI. Would like a new referral for psychiatry. Does not feel like he is getting help where he is at currently. He is requesting something to help with sleep. Has a hard time falling and staying asleep    Attempting smoking cessation. Trying to use patches. Review of Systems   Constitutional:  Negative for chills and fever. HENT:  Negative for congestion and rhinorrhea. Respiratory:  Positive for wheezing. Negative for cough and shortness of breath. Cardiovascular:  Negative for chest pain and leg swelling. Gastrointestinal:  Negative for abdominal pain, nausea and vomiting. Genitourinary:  Negative for dysuria and hematuria. Musculoskeletal:  Negative for arthralgias and myalgias. Skin:  Negative for rash and wound. Neurological:  Negative for dizziness and light-headedness. Psychiatric/Behavioral:  Positive for hallucinations (auditory). Prior to Visit Medications    Medication Sig Taking?  Authorizing Provider   sildenafil (REVATIO) 20 MG tablet take 1 tablet by mouth daily if needed for SEXUAL ACTIVITY Yes Emmie Howard MD   lisinopril (PRINIVIL;ZESTRIL) 20 MG tablet take 1 tablet by mouth once daily Yes Emmie Howard MD   acyclovir (ZOVIRAX) 400 MG tablet Take 1 tablet by mouth Advised.   2 times daily Yes Mally Mancuso MD   sildenafil (VIAGRA) 50 MG tablet Take 1 tablet by mouth as needed for Erectile Dysfunction Yes Mally Mancuso MD   potassium citrate (UROCIT-K) 10 MEQ (1080 MG) extended release tablet Take 1 tablet by mouth 3 times daily (with meals) Yes Mally Mancuso MD   atorvastatin (LIPITOR) 40 MG tablet Take 1 tablet by mouth daily Yes Mally Mancuso MD   diflunisal (DOLOBID) 500 MG TABS TAKE 1 TABLET BY MOUTH 2 TIMES DAILY AS NEEDED FOR PAIN Yes Mally Mancuso MD   nicotine (NICODERM CQ) 21 MG/24HR Place 1 patch onto the skin daily Yes Mally Mancuso MD   divalproex (DEPAKOTE ER) 500 MG extended release tablet Take 1,000 mg by mouth 2 times daily Instructed to take am of procedure Yes Historical Provider, MD   QUEtiapine (SEROQUEL) 300 MG tablet Take 300 mg by mouth nightly Yes Historical Provider, MD   VENTOLIN  (90 Base) MCG/ACT inhaler inhale 2 puffs by mouth every 4 hours if needed for wheezing Yes Pari Noriega MD   amphetamine-dextroamphetamine (ADDERALL XR) 15 MG extended release capsule Take 15 mg by mouth every morning. Instructed to take am of procedure Yes Historical Provider, MD       Social History     Tobacco Use    Smoking status: Every Day     Packs/day: 1.00     Years: 2.00     Pack years: 2.00     Types: Cigarettes    Smokeless tobacco: Never   Vaping Use    Vaping Use: Never used   Substance Use Topics    Alcohol use: No    Drug use: No            PHYSICAL EXAMINATION:  Vital Signs: (As obtained by patient/caregiver or practitioner observation)    Physical Exam  Vitals reviewed. Constitutional:       General: He is not in acute distress. Appearance: He is not ill-appearing or toxic-appearing. HENT:      Head: Normocephalic and atraumatic. Eyes:      Extraocular Movements: Extraocular movements intact. Conjunctiva/sclera: Conjunctivae normal.   Pulmonary:      Effort: Pulmonary effort is normal. No respiratory distress. Abdominal:      General: Abdomen is flat. There is no distension. Neurological:      General: No focal deficit present. Mental Status: He is alert and oriented to person, place, and time. Psychiatric:         Mood and Affect: Mood normal.         Behavior: Behavior normal.       Other pertinent observable physical exam findings-     Due to this being a TeleHealth encounter, evaluation of the following organ systems is limited: Vitals/Constitutional/EENT/Resp/CV/GI//MS/Neuro/Skin/Heme-Lymph-Imm. ASSESSMENT/PLAN:  Jessica Mcghee was seen today for hypertension. Diagnoses and all orders for this visit:    Medication refill    Essential hypertension    Encounter for smoking cessation counseling    Acute viral sinusitis    Meds refilled  Needs BP checked  Trazodone for sleep  New psychiatry referral placed  Patches ordered to help with smoking cessation  Needs labs. Orders placed. Advised to get ASAP    No follow-ups on file. An  electronic signature was used to authenticate this note. --Lelia Garcia MD on 11/15/2022 at 3:36 PM    Pursuant to the emergency declaration under the [facility], On license of [facility] waiver authority and the [employer] and [employer] Act, this Virtual  Visit was conducted, with patient's consent, to reduce the patient's risk of exposure to COVID-19 and provide continuity of care for an established patient. Services were provided through a video synchronous discussion virtually to substitute for in-person clinic visit.

## 2022-12-14 ENCOUNTER — TRANSFERRED RECORDS (OUTPATIENT)
Dept: INTERNAL MEDICINE | Facility: CLINIC | Age: 62
End: 2022-12-14

## 2023-03-20 DIAGNOSIS — E11.65 UNCONTROLLED TYPE 2 DIABETES MELLITUS WITH HYPERGLYCEMIA (H): ICD-10-CM

## 2023-03-22 RX ORDER — SITAGLIPTIN 100 MG/1
TABLET, FILM COATED ORAL
Qty: 90 TABLET | Refills: 0 | Status: SHIPPED | OUTPATIENT
Start: 2023-03-22 | End: 2023-06-04

## 2023-03-22 NOTE — TELEPHONE ENCOUNTER
Medication is being filled for 1 time refill only due to:  Patient needs to be seen because per last office visit.     3. Schedule ANNUAL EXAM after Feb 15    Please call patient and get her scheduled.

## 2023-03-28 NOTE — TELEPHONE ENCOUNTER
Next 5 appointments (look out 90 days)    Apr 21, 2023  7:45 AM  Lab visit with [facility]  [facility] ([facility] [place] ) [address]  [address]  [facility] [id number] [phone]        04/28/23 with Dr. Kebede.    Jenna Fischer, [facility] [place]  [phone]

## 2023-04-23 ENCOUNTER — MYC MEDICAL ADVICE (OUTPATIENT)
Dept: INTERNAL MEDICINE | Facility: CLINIC | Age: 63
End: 2023-04-23
Payer: OTHER GOVERNMENT

## 2023-04-23 DIAGNOSIS — E11.65 UNCONTROLLED TYPE 2 DIABETES MELLITUS WITH HYPERGLYCEMIA (H): ICD-10-CM

## 2023-05-06 ENCOUNTER — HEALTH MAINTENANCE LETTER (OUTPATIENT)
Age: 63
End: 2023-05-06

## 2023-05-17 ENCOUNTER — LAB (OUTPATIENT)
Dept: LAB | Facility: CLINIC | Age: 63
End: 2023-05-17
Payer: OTHER GOVERNMENT

## 2023-05-17 DIAGNOSIS — E11.65 UNCONTROLLED TYPE 2 DIABETES MELLITUS WITH HYPERGLYCEMIA (H): ICD-10-CM

## 2023-05-17 DIAGNOSIS — Z11.4 SCREENING FOR HIV (HUMAN IMMUNODEFICIENCY VIRUS): ICD-10-CM

## 2023-05-17 LAB — HBA1C MFR BLD: 6.9 % (ref 0–5.6)

## 2023-05-17 PROCEDURE — 36415 COLL VENOUS BLD VENIPUNCTURE: CPT

## 2023-05-17 PROCEDURE — 87389 HIV-1 AG W/HIV-1&-2 AB AG IA: CPT

## 2023-05-17 PROCEDURE — 83036 HEMOGLOBIN GLYCOSYLATED A1C: CPT

## 2023-05-18 LAB — HIV 1+2 AB+HIV1 P24 AG SERPL QL IA: NONREACTIVE

## 2023-06-02 DIAGNOSIS — E11.65 UNCONTROLLED TYPE 2 DIABETES MELLITUS WITH HYPERGLYCEMIA (H): ICD-10-CM

## 2023-06-04 RX ORDER — SITAGLIPTIN 100 MG/1
TABLET, FILM COATED ORAL
Qty: 90 TABLET | Refills: 0 | Status: SHIPPED | OUTPATIENT
Start: 2023-06-04 | End: 2023-07-11

## 2023-06-04 NOTE — TELEPHONE ENCOUNTER
Pending Prescriptions:                       Disp   Refills    JANUVIA 100 MG tablet [Pharmacy Med Name: *90 tab*3        Sig: TAKE 1 TABLET DAILY (NEED TO BE SEEN, DUE FOR ANNUAL           EXAM WITH DR. BRADY)    Routing refill request to provider for review/approval because:  Needs provider review

## 2023-06-19 ENCOUNTER — TRANSFERRED RECORDS (OUTPATIENT)
Dept: HEALTH INFORMATION MANAGEMENT | Facility: CLINIC | Age: 63
End: 2023-06-19
Payer: OTHER GOVERNMENT

## 2023-07-11 ENCOUNTER — OFFICE VISIT (OUTPATIENT)
Dept: INTERNAL MEDICINE | Facility: CLINIC | Age: 63
End: 2023-07-11
Payer: OTHER GOVERNMENT

## 2023-07-11 VITALS
OXYGEN SATURATION: 98 % | HEIGHT: 65 IN | HEART RATE: 108 BPM | DIASTOLIC BLOOD PRESSURE: 89 MMHG | TEMPERATURE: 96.6 F | WEIGHT: 144.5 LBS | SYSTOLIC BLOOD PRESSURE: 134 MMHG | RESPIRATION RATE: 18 BRPM | BODY MASS INDEX: 24.07 KG/M2

## 2023-07-11 DIAGNOSIS — E11.65 UNCONTROLLED TYPE 2 DIABETES MELLITUS WITH HYPERGLYCEMIA (H): ICD-10-CM

## 2023-07-11 DIAGNOSIS — C50.911 DUCTAL CARCINOMA OF RIGHT BREAST (H): ICD-10-CM

## 2023-07-11 DIAGNOSIS — R63.4 UNINTENDED WEIGHT LOSS: Primary | ICD-10-CM

## 2023-07-11 DIAGNOSIS — E78.5 HYPERLIPIDEMIA LDL GOAL <100: ICD-10-CM

## 2023-07-11 DIAGNOSIS — I10 HTN, GOAL BELOW 140/90: ICD-10-CM

## 2023-07-11 PROCEDURE — 99214 OFFICE O/P EST MOD 30 MIN: CPT | Performed by: INTERNAL MEDICINE

## 2023-07-11 RX ORDER — ROSUVASTATIN CALCIUM 20 MG/1
20 TABLET, COATED ORAL DAILY
Qty: 90 TABLET | Refills: 1 | Status: SHIPPED | OUTPATIENT
Start: 2023-07-11 | End: 2023-10-13

## 2023-07-11 ASSESSMENT — PAIN SCALES - GENERAL: PAINLEVEL: NO PAIN (0)

## 2023-07-11 ASSESSMENT — PATIENT HEALTH QUESTIONNAIRE - PHQ9: SUM OF ALL RESPONSES TO PHQ QUESTIONS 1-9: 1

## 2023-07-11 NOTE — PROGRESS NOTES
Dr Salazar's note      Patient's instructions / PLAN:                                                        Plan:  1. Chest/abd CT --   To schedule this test you may call Scheduling center at [phone]   It is the patient responsibility to check with insurance for coverage before you go for the test.  2. Continue same meds, same doses for now   3. Schedule ANNUAL EXAM in October 4. Please make a lab appointment for fasting labs few days before        ASSESSMENT & PLAN:                                                      The chronic medical problems, diabetes, hyperlipidemia, hypertension are controlled and we will continue the same medications and monitor the labs.  She has history of stage III right breast cancer diagnosed in 2017, doing well on the present medications.  Over the last months she noticed unintentional weight loss.  Because of the history of the breast cancer I recommended CT.  She agrees    (R63.4) Unintended weight loss  (primary encounter diagnosis)  Comment:   Plan: CT Chest Abdomen w Contrast, Hemoglobin A1c, CK        total, Lipid panel reflex to direct LDL         Fasting, Comprehensive metabolic panel, TSH         with free T4 reflex, Albumin Random Urine         Quantitative with Creat Ratio, CBC with         platelets            (C50.911) Ductal carcinoma of right breast (H)  Comment:   Plan: CT Chest Abdomen w Contrast, Hemoglobin A1c, CK        total, Lipid panel reflex to direct LDL         Fasting, Comprehensive metabolic panel, TSH         with free T4 reflex, Albumin Random Urine         Quantitative with Creat Ratio, CBC with         platelets            (E11.65) DM 2, no insulin (H) #  Comment:   Plan: empagliflozin (JARDIANCE) 10 MG TABS tablet,         Hemoglobin A1c, CK total, Lipid panel reflex to        direct LDL Fasting, Comprehensive metabolic         panel, TSH with free T4 reflex, Albumin Random         Urine Quantitative with Creat Ratio, CBC with          platelets            (E11.65) DM 2, no insulin (H)  Comment:   Plan: sitagliptin (JANUVIA) 100 MG tablet, metFORMIN         (GLUCOPHAGE) 1000 MG tablet            (I10) HTN, goal below 140/90  Comment:   Plan: Hemoglobin A1c, CK total, Lipid panel reflex to        direct LDL Fasting, Comprehensive metabolic         panel, TSH with free T4 reflex, Albumin Random         Urine Quantitative with Creat Ratio, CBC with         platelets            (E78.5) Hyperlipidemia LDL goal <100 #  Comment:   Plan: rosuvastatin (CRESTOR) 20 MG tablet, Hemoglobin        A1c, CK total, Lipid panel reflex to direct LDL        Fasting, Comprehensive metabolic panel, TSH         with free T4 reflex, Albumin Random Urine         Quantitative with Creat Ratio, CBC with         platelets               Chief complaint:                                                      Follow up chronic medical problems      SUBJECTIVE:                                                    History of present illness:    DM  -- May A1c 6.9    Wt loss  -- she dropped wt size 10 to 4     Subjective   Cheryl is a 62 year old, presenting for the following health issues:  Patient is being seen for an follow up.       No data to display      Roomed by: Sita Aquino 07/11/2023        [facility]     Diabetes Follow-up      How often are you checking your blood sugar? Not at all    What concerns do you have today about your diabetes? None     Do you have any of these symptoms? (Select all that apply)  No numbness or tingling in feet.  No redness, sores or blisters on feet.  No complaints of excessive thirst.  No reports of blurry vision.  No significant changes to weight.    Have you had a diabetic eye exam in the last 12 months? No            Hyperlipidemia Follow-Up      Are you regularly taking any medication or supplement to lower your cholesterol?   Yes- rosuvastatin    Are you having muscle aches or other side effects that you think could be caused by your cholesterol  "lowering medication?  No    Hypertension Follow-up      Do you check your blood pressure regularly outside of the clinic? No     Are you following a low salt diet? No    Are your blood pressures ever more than 140 on the top number (systolic) OR more   than 90 on the bottom number (diastolic), for example 140/90? No    BP Readings from Last 2 Encounters:   11/03/22 132/78   02/15/22 130/70     Hemoglobin A1C (%)   Date Value   05/17/2023 6.9 (H)   10/24/2022 7.8 (H)   02/08/2021 7.0 (H)   10/01/2020 6.6 (H)     LDL Cholesterol Calculated (mg/dL)   Date Value   10/24/2022 83   05/10/2022 120 (H)   10/01/2020 107 (H)   02/18/2019 89         How many servings of fruits and vegetables do you eat daily?  2-3    On average, how many sweetened beverages do you drink each day (Examples: soda, juice, sweet tea, etc.  Do NOT count diet or artificially sweetened beverages)?   0    How many days per week do you exercise enough to make your heart beat faster? 3 or less    How many minutes a day do you exercise enough to make your heart beat faster? 9 or less    How many days per week do you miss taking your medication? 0      Review of Systems:                                                      ROS: negative for fever, chills, cough, wheezes, chest pain, shortness of breath, vomiting, abdominal pain, leg swelling       OBJECTIVE:             Physical exam:  Blood pressure 134/89, pulse 108, temperature (!) 96.6  F (35.9  C), temperature source Tympanic, resp. rate 18, height 1.657 m (5' 5.25\"), weight 65.5 kg (144 lb 8 oz), last menstrual period 06/05/2017, SpO2 98 %, not currently breastfeeding.     NAD, appears comfortable  Skin: no rashes   Neck: supple, no JVD,  No thyroidmegaly. Lymph nodes nonpalpable cervical and supraclavicular.  Chest: clear to auscultation bilaterally, good respiratory effort  Heart: S1 S2, RRR, no mgr appreciated  Abdomen: soft, not tender, no hepatosplenomegaly or masses appreciated, no abdominal " bruit, present bowel sounds  Extremities: no edema,   Neurologic: A, Ox3, no focal signs appreciated    PMHx: reviewed  Past Medical History:   Diagnosis Date     Anxiety      Breast cancer (H)      Cancer (H)      Diabetes (H)      History of blood transfusion      Hyperlipidemia LDL goal <100      Hypertension      Papanicolaou smear of cervix with low grade squamous intraepithelial lesion (LGSIL) 08/18/2017 8/18/17 LSIL/Neg HPV. Plan: cotest in 1 year      PSHx: reviewed  Past Surgical History:   Procedure Laterality Date     BIOPSY       BREAST SURGERY       COLONOSCOPY N/A 3/11/2019    Procedure: COLONOSCOPY;  Surgeon: Nam Shah MD;  Location:  GI     DILATION AND CURETTAGE, OPERATIVE HYSTEROSCOPY, COMBINED N/A 5/18/2018    Procedure: COMBINED DILATION AND CURETTAGE, OPERATIVE HYSTEROSCOPY;  Hysteroscopy, Dilation and Curettage;  Surgeon: Lashawn Hernandez MD;  Location: [facility] OR     GYN SURGERY       MASTECTOMY          Meds: reviewed  Current Outpatient Medications   Medication Sig Dispense Refill     apremilast (OTEZLA) 30 MG tablet Take 30 mg by mouth 2 times daily       empagliflozin (JARDIANCE) 10 MG TABS tablet Take 1 tablet (10 mg) by mouth daily 90 tablet 0     JANUVIA 100 MG tablet TAKE 1 TABLET DAILY (NEED TO BE SEEN, DUE FOR ANNUAL EXAM WITH DR. BRADY) 90 tablet 0     letrozole (FEMARA) 2.5 MG tablet Take 1 tablet (2.5 mg) by mouth daily       lisinopril (ZESTRIL) 10 MG tablet Take 1/2 (one-half) tablet by mouth once daily Strength: 10 mg 45 tablet 1     metFORMIN (GLUCOPHAGE) 1000 MG tablet TAKE 1 TABLET TWICE A DAY WITH MEALS 180 tablet 0     rosuvastatin (CRESTOR) 20 MG tablet Take 1 tablet (20 mg) by mouth daily 90 tablet 1     Venlafaxine HCl (EFFEXOR PO) Take 50 mg by mouth daily         Soc Hx: reviewed  Fam Hx: reviewed      Chart documentation was completed, in part, with [employer] voice-recognition software. Even though reviewed, some grammatical, spelling, and word errors  may remain.      Archana Salazar MD  Internal Medicine

## 2023-07-11 NOTE — PATIENT INSTRUCTIONS
Plan:  1. Chest/abd CT --   To schedule this test you may call Scheduling center at [phone]   It is the patient responsibility to check with insurance for coverage before you go for the test.  2. Continue same meds, same doses for now   3. Schedule ANNUAL EXAM in October 4. Please make a lab appointment for fasting labs few days before

## 2023-08-10 ENCOUNTER — HOSPITAL ENCOUNTER (OUTPATIENT)
Dept: CT IMAGING | Facility: CLINIC | Age: 63
Discharge: HOME | End: 2023-08-10
Attending: INTERNAL MEDICINE | Admitting: INTERNAL MEDICINE
Payer: OTHER GOVERNMENT

## 2023-08-10 DIAGNOSIS — R63.4 UNINTENDED WEIGHT LOSS: ICD-10-CM

## 2023-08-10 DIAGNOSIS — C50.911 DUCTAL CARCINOMA OF RIGHT BREAST (H): ICD-10-CM

## 2023-08-10 LAB
CREAT BLD-MCNC: 0.6 MG/DL (ref 0.5–1)
GFR SERPL CREATININE-BSD FRML MDRD: >60 ML/MIN/1.73M2

## 2023-08-10 PROCEDURE — 71260 CT THORAX DX C+: CPT

## 2023-08-10 PROCEDURE — 82565 ASSAY OF CREATININE: CPT

## 2023-08-10 PROCEDURE — 250N000009 HC RX 250: Performed by: INTERNAL MEDICINE

## 2023-08-10 PROCEDURE — 250N000011 HC RX IP 250 OP 636: Performed by: INTERNAL MEDICINE

## 2023-08-10 RX ORDER — IOPAMIDOL 755 MG/ML
500 INJECTION, SOLUTION INTRAVASCULAR ONCE
Status: COMPLETED | OUTPATIENT
Start: 2023-08-10 | End: 2023-08-10

## 2023-08-10 RX ADMIN — IOPAMIDOL 89 ML: 755 INJECTION, SOLUTION INTRAVENOUS at 15:39

## 2023-08-10 RX ADMIN — SODIUM CHLORIDE 52 ML: 9 INJECTION, SOLUTION INTRAVENOUS at 15:39

## 2023-09-28 ENCOUNTER — LAB (OUTPATIENT)
Dept: LAB | Facility: CLINIC | Age: 63
End: 2023-09-28
Payer: OTHER GOVERNMENT

## 2023-09-28 DIAGNOSIS — E11.65 UNCONTROLLED TYPE 2 DIABETES MELLITUS WITH HYPERGLYCEMIA (H): ICD-10-CM

## 2023-09-28 DIAGNOSIS — R63.4 UNINTENDED WEIGHT LOSS: ICD-10-CM

## 2023-09-28 DIAGNOSIS — E78.5 HYPERLIPIDEMIA LDL GOAL <100: ICD-10-CM

## 2023-09-28 DIAGNOSIS — I10 HTN, GOAL BELOW 140/90: ICD-10-CM

## 2023-09-28 DIAGNOSIS — C50.911 DUCTAL CARCINOMA OF RIGHT BREAST (H): ICD-10-CM

## 2023-09-28 LAB
ALBUMIN SERPL BCG-MCNC: 4.4 G/DL (ref 3.5–5.2)
ALP SERPL-CCNC: 86 U/L (ref 35–104)
ALT SERPL W P-5'-P-CCNC: 18 U/L (ref 0–50)
ANION GAP SERPL CALCULATED.3IONS-SCNC: 10 MMOL/L (ref 7–15)
AST SERPL W P-5'-P-CCNC: 19 U/L (ref 0–45)
BILIRUB SERPL-MCNC: 0.2 MG/DL
BUN SERPL-MCNC: 19.3 MG/DL (ref 8–23)
CALCIUM SERPL-MCNC: 9.7 MG/DL (ref 8.8–10.2)
CHLORIDE SERPL-SCNC: 106 MMOL/L (ref 98–107)
CHOLEST SERPL-MCNC: 198 MG/DL
CK SERPL-CCNC: 78 U/L (ref 26–192)
CREAT SERPL-MCNC: 0.75 MG/DL (ref 0.51–0.95)
CREAT UR-MCNC: 72.9 MG/DL
EGFRCR SERPLBLD CKD-EPI 2021: 90 ML/MIN/1.73M2
ERYTHROCYTE [DISTWIDTH] IN BLOOD BY AUTOMATED COUNT: 12.7 % (ref 10–15)
GLUCOSE SERPL-MCNC: 158 MG/DL (ref 70–99)
HBA1C MFR BLD: 7.4 % (ref 0–5.6)
HCO3 SERPL-SCNC: 25 MMOL/L (ref 22–29)
HCT VFR BLD AUTO: 41.9 % (ref 35–47)
HDLC SERPL-MCNC: 74 MG/DL
HGB BLD-MCNC: 13.8 G/DL (ref 11.7–15.7)
LDLC SERPL CALC-MCNC: 101 MG/DL
MCH RBC QN AUTO: 29.2 PG (ref 26.5–33)
MCHC RBC AUTO-ENTMCNC: 32.9 G/DL (ref 31.5–36.5)
MCV RBC AUTO: 89 FL (ref 78–100)
MICROALBUMIN UR-MCNC: <12 MG/L
MICROALBUMIN/CREAT UR: NORMAL MG/G{CREAT}
NONHDLC SERPL-MCNC: 124 MG/DL
PLATELET # BLD AUTO: 210 10E3/UL (ref 150–450)
POTASSIUM SERPL-SCNC: 4.3 MMOL/L (ref 3.4–5.3)
PROT SERPL-MCNC: 6.8 G/DL (ref 6.4–8.3)
RBC # BLD AUTO: 4.72 10E6/UL (ref 3.8–5.2)
SODIUM SERPL-SCNC: 141 MMOL/L (ref 135–145)
TRIGL SERPL-MCNC: 115 MG/DL
TSH SERPL DL<=0.005 MIU/L-ACNC: 2.05 UIU/ML (ref 0.3–4.2)
WBC # BLD AUTO: 6.5 10E3/UL (ref 4–11)

## 2023-09-28 PROCEDURE — 82043 UR ALBUMIN QUANTITATIVE: CPT

## 2023-09-28 PROCEDURE — 82550 ASSAY OF CK (CPK): CPT

## 2023-09-28 PROCEDURE — 82570 ASSAY OF URINE CREATININE: CPT

## 2023-09-28 PROCEDURE — 83036 HEMOGLOBIN GLYCOSYLATED A1C: CPT

## 2023-09-28 PROCEDURE — 80061 LIPID PANEL: CPT

## 2023-09-28 PROCEDURE — 80053 COMPREHEN METABOLIC PANEL: CPT

## 2023-09-28 PROCEDURE — 85027 COMPLETE CBC AUTOMATED: CPT

## 2023-09-28 PROCEDURE — 36415 COLL VENOUS BLD VENIPUNCTURE: CPT

## 2023-09-28 PROCEDURE — 84443 ASSAY THYROID STIM HORMONE: CPT

## 2023-10-13 ENCOUNTER — OFFICE VISIT (OUTPATIENT)
Dept: INTERNAL MEDICINE | Facility: CLINIC | Age: 63
End: 2023-10-13
Payer: OTHER GOVERNMENT

## 2023-10-13 VITALS
HEART RATE: 111 BPM | OXYGEN SATURATION: 99 % | WEIGHT: 146.4 LBS | TEMPERATURE: 96.5 F | DIASTOLIC BLOOD PRESSURE: 70 MMHG | BODY MASS INDEX: 24.39 KG/M2 | HEIGHT: 65 IN | SYSTOLIC BLOOD PRESSURE: 120 MMHG | RESPIRATION RATE: 18 BRPM

## 2023-10-13 DIAGNOSIS — I10 HTN, GOAL BELOW 140/90: ICD-10-CM

## 2023-10-13 DIAGNOSIS — C50.111 MALIGNANT NEOPLASM OF CENTRAL PORTION OF RIGHT BREAST IN FEMALE, ESTROGEN RECEPTOR POSITIVE (H): ICD-10-CM

## 2023-10-13 DIAGNOSIS — Z17.0 MALIGNANT NEOPLASM OF CENTRAL PORTION OF RIGHT BREAST IN FEMALE, ESTROGEN RECEPTOR POSITIVE (H): ICD-10-CM

## 2023-10-13 DIAGNOSIS — Z00.00 ROUTINE GENERAL MEDICAL EXAMINATION AT A HEALTH CARE FACILITY: Primary | ICD-10-CM

## 2023-10-13 DIAGNOSIS — E11.65 UNCONTROLLED TYPE 2 DIABETES MELLITUS WITH HYPERGLYCEMIA (H): ICD-10-CM

## 2023-10-13 DIAGNOSIS — E78.5 HYPERLIPIDEMIA LDL GOAL <100: ICD-10-CM

## 2023-10-13 PROCEDURE — 99214 OFFICE O/P EST MOD 30 MIN: CPT | Mod: 25 | Performed by: INTERNAL MEDICINE

## 2023-10-13 PROCEDURE — 99396 PREV VISIT EST AGE 40-64: CPT | Performed by: INTERNAL MEDICINE

## 2023-10-13 RX ORDER — ROSUVASTATIN CALCIUM 20 MG/1
20 TABLET, COATED ORAL DAILY
Qty: 90 TABLET | Refills: 1 | Status: SHIPPED | OUTPATIENT
Start: 2023-10-13 | End: 2024-03-05

## 2023-10-13 RX ORDER — METFORMIN HCL 500 MG
1000 TABLET, EXTENDED RELEASE 24 HR ORAL 2 TIMES DAILY WITH MEALS
Qty: 360 TABLET | Refills: 1 | Status: SHIPPED | OUTPATIENT
Start: 2023-10-13 | End: 2024-03-05

## 2023-10-13 RX ORDER — LISINOPRIL 5 MG/1
5 TABLET ORAL DAILY
Qty: 90 TABLET | Refills: 1 | Status: SHIPPED | OUTPATIENT
Start: 2023-10-13 | End: 2024-03-05

## 2023-10-13 ASSESSMENT — PAIN SCALES - GENERAL: PAINLEVEL: NO PAIN (0)

## 2023-10-13 ASSESSMENT — ENCOUNTER SYMPTOMS
HEARTBURN: 0
SORE THROAT: 0
DIARRHEA: 0
CONSTIPATION: 0
ARTHRALGIAS: 0
FEVER: 0
WEAKNESS: 0
FREQUENCY: 0
SHORTNESS OF BREATH: 0
HEADACHES: 0
HEMATURIA: 0
MYALGIAS: 0
NERVOUS/ANXIOUS: 0
BREAST MASS: 0
ABDOMINAL PAIN: 0
PARESTHESIAS: 0
EYE PAIN: 0
PALPITATIONS: 0
JOINT SWELLING: 0
DYSURIA: 0
DIZZINESS: 0
HEMATOCHEZIA: 0
NAUSEA: 0
COUGH: 0
CHILLS: 0

## 2023-10-13 NOTE — PATIENT INSTRUCTIONS
Plan:  Increase Jardiance to 25 mg daily  2. Lisinopril -- if you are taking it continue to take it. I f you are not taking it do not start it  3. Continue the other meds, same doses for now.  4. Please make a lab appointment for non fasting labs  in March 5. Please make an appointment few days after the labs to discuss about the results.

## 2023-12-14 ENCOUNTER — TRANSFERRED RECORDS (OUTPATIENT)
Dept: HEALTH INFORMATION MANAGEMENT | Facility: CLINIC | Age: 63
End: 2023-12-14
Payer: OTHER GOVERNMENT

## 2024-02-13 NOTE — PLAN OF CARE
Problem: Discharge Planning  Goal: Discharge Planning (Adult, OB, Behavioral, Peds)  Outcome: Adequate for Discharge Date Met:  07/27/17  Writer went over D/C paperwork including medications/med admin times and follow up appointments with pt and pt's . Information about neutropenia included. No prescriptions needed to be filled. Neither pt nor pt's  had any further questions/concerns at time of discharge. D/C at 1135 with  for transportation.      
Problem: Goal Outcome Summary  Goal: Goal Outcome Summary  Outcome: Improving  Reports good pain control with po benadryl plus naproxen times one. No complaints of nausea. Afebrile. Good appetite. Ambulating independently.      
Problem: Goal Outcome Summary  Goal: Goal Outcome Summary  Outcome: No Change  Alert and oriented. No pain throughout night but this morning having some back pain, oxycodone given. No nausea, will advance diet.       
Problem: Goal Outcome Summary  Goal: Goal Outcome Summary  Outcome: No Change  Pt admitted this evening from ED around 2130. A&O, up in room with SBA, Tier A activity level. VSS, afebrile + sats maintained on RA. Denies pain, dilaudid in ED. Denies nausea following anti-emetics in ED, tolerating clears, , no SSI required. Port patent, IVF running. LS clear, UA-, denies urinary s/sx of infection, BCP. Will continue to monitor.      
Problem: Goal Outcome Summary  Goal: Goal Outcome Summary  Outcome: No Change  Pt admitted with neutropenic fever, afebrile today.  Pt continues to have back pain, given oxycodone and tylenol, not much relief.  MD added benadryl and naproxen to try.  Pt denies nausea, tolerating a mod CHO diet. Voiding adeq. Ambulated in dior independently.  Hem/onc following.       
PAST MEDICAL HISTORY:  Asthma     Bipolar disorder     Development delay     DM (diabetes mellitus)     Intellectual disability     Schizoaffective disorder     Schizophrenia

## 2024-02-21 DIAGNOSIS — I10 HTN, GOAL BELOW 140/90: ICD-10-CM

## 2024-02-21 DIAGNOSIS — E11.65 UNCONTROLLED TYPE 2 DIABETES MELLITUS WITH HYPERGLYCEMIA (H): ICD-10-CM

## 2024-02-22 ENCOUNTER — DOCUMENTATION ONLY (OUTPATIENT)
Dept: LAB | Facility: CLINIC | Age: 64
End: 2024-02-22
Payer: OTHER GOVERNMENT

## 2024-02-22 RX ORDER — LISINOPRIL 10 MG/1
TABLET ORAL
Qty: 45 TABLET | Refills: 3 | OUTPATIENT
Start: 2024-02-22

## 2024-02-22 NOTE — PROGRESS NOTES
Cheryl TAYLOR Patel has an upcoming lab appointment:    Future Appointments   Date Time Provider Department Center   2/27/2024  7:30 AM [facility]   3/5/2024  7:00 AM Archana Chen MD [facility]     Patient is scheduled for the following lab(s): Non-fasting labs     There is no order available. Please review and place either future orders or HMPO (Review of Health Maintenance Protocol Orders), as appropriate.    Health Maintenance Due   Topic    ANNUAL REVIEW OF HM ORDERS      Ceci Garcia

## 2024-02-24 ENCOUNTER — HEALTH MAINTENANCE LETTER (OUTPATIENT)
Age: 64
End: 2024-02-24

## 2024-02-27 ENCOUNTER — LAB (OUTPATIENT)
Dept: LAB | Facility: CLINIC | Age: 64
End: 2024-02-27
Payer: OTHER GOVERNMENT

## 2024-02-27 DIAGNOSIS — E11.65 UNCONTROLLED TYPE 2 DIABETES MELLITUS WITH HYPERGLYCEMIA (H): ICD-10-CM

## 2024-02-27 LAB — HBA1C MFR BLD: 7.3 % (ref 0–5.6)

## 2024-02-27 PROCEDURE — 83036 HEMOGLOBIN GLYCOSYLATED A1C: CPT

## 2024-02-27 PROCEDURE — 36415 COLL VENOUS BLD VENIPUNCTURE: CPT

## 2024-03-05 ENCOUNTER — OFFICE VISIT (OUTPATIENT)
Dept: INTERNAL MEDICINE | Facility: CLINIC | Age: 64
End: 2024-03-05
Payer: OTHER GOVERNMENT

## 2024-03-05 VITALS
HEIGHT: 65 IN | DIASTOLIC BLOOD PRESSURE: 88 MMHG | BODY MASS INDEX: 23.66 KG/M2 | TEMPERATURE: 97 F | SYSTOLIC BLOOD PRESSURE: 139 MMHG | OXYGEN SATURATION: 96 % | WEIGHT: 142 LBS | HEART RATE: 84 BPM

## 2024-03-05 DIAGNOSIS — Z12.31 VISIT FOR SCREENING MAMMOGRAM: ICD-10-CM

## 2024-03-05 DIAGNOSIS — E78.5 HYPERLIPIDEMIA LDL GOAL <100: ICD-10-CM

## 2024-03-05 DIAGNOSIS — I10 HTN, GOAL BELOW 140/90: ICD-10-CM

## 2024-03-05 DIAGNOSIS — E11.65 UNCONTROLLED TYPE 2 DIABETES MELLITUS WITH HYPERGLYCEMIA (H): Primary | ICD-10-CM

## 2024-03-05 PROCEDURE — 99207 PR FOOT EXAM NO CHARGE: CPT | Performed by: INTERNAL MEDICINE

## 2024-03-05 PROCEDURE — 99214 OFFICE O/P EST MOD 30 MIN: CPT | Performed by: INTERNAL MEDICINE

## 2024-03-05 RX ORDER — LISINOPRIL 5 MG/1
5 TABLET ORAL DAILY
Qty: 90 TABLET | Refills: 1 | Status: SHIPPED | OUTPATIENT
Start: 2024-03-05 | End: 2024-06-25

## 2024-03-05 RX ORDER — GLYBURIDE 1.25 MG/1
1.25 TABLET ORAL
Qty: 90 TABLET | Refills: 1 | Status: SHIPPED | OUTPATIENT
Start: 2024-03-05 | End: 2024-06-25 | Stop reason: SINTOL

## 2024-03-05 RX ORDER — ROSUVASTATIN CALCIUM 20 MG/1
20 TABLET, COATED ORAL DAILY
Qty: 90 TABLET | Refills: 1 | Status: SHIPPED | OUTPATIENT
Start: 2024-03-05 | End: 2024-06-25

## 2024-03-05 NOTE — PROGRESS NOTES
Dr Salazar's note      Patient's instructions / PLAN:                                                        Plan:  Add Glyburide 1.25 mg daily   2. Since you do not tolerate well Metformin at lunch time, take 1/2 tab = 500 mg at lunch time and 1000 - 1500 mg at dinner  3. Take the Lisinopril 5 mg daily. If the blood pressure goes below 11 and you feel lightheaded take only 1/2 tab = 2.5 mg daily  4. Mammogram ( please call [phone] to schedule it)   5. Please make a lab appointment for non fasting labs  in June - July  6. Please make an appointment few days after the labs to discuss about the results.        ASSESSMENT & PLAN:                                                        (Z12.31) Visit for screening mammogram  Comment:   Plan: mamm     (E11.65) DM 2, no insulin (H)  Comment: stable  We discussed about the new meds, advantages and potential side effects. The patient will read also the info from the pharmacy and call back if questions.   Plan: glyBURIDE (DIABETA /MICRONASE) 1.25 MG tablet,         empagliflozin (JARDIANCE) 25 MG TABS tablet,         metFORMIN (GLUCOPHAGE) 1000 MG tablet,         sitagliptin (JANUVIA) 100 MG tablet, lisinopril        (ZESTRIL) 5 MG tablet, Hemoglobin A1c            (E78.5) Hyperlipidemia LDL goal <100 #  Comment: Controlled  based on prior numbers   Plan: rosuvastatin (CRESTOR) 20 MG tablet            (I10) HTN, goal below 140/90  Comment: Controlled    Plan: lisinopril (ZESTRIL) 5 MG tablet               Chief complaint:                                                      Follow up chronic medical problems      SUBJECTIVE:                                                    History of present illness:    DM    Lab Results   Component Value Date    A1C 7.3 02/27/2024    A1C 7.4 09/28/2023    A1C 6.9 05/17/2023    A1C 7.8 10/24/2022    A1C 7.2 05/10/2022    A1C 7.0 02/08/2021    A1C 6.6 10/01/2020    A1C 6.8 12/04/2019    A1C 7.5 05/02/2019    A1C 8.2 01/22/2019     "    Jihan Luna is a 63 year old, presenting for the following health issues:  RECHECK, Hypertension, and Diabetes      3/5/2024     6:55 AM   Additional Questions   Roomed by ROBER Nichols LPN   Accompanied by self         3/5/2024     6:55 AM   Patient Reported Additional Medications   Patient reports taking the following new medications none     History of Present Illness       Diabetes:   She presents for follow up of diabetes.  She is checking home blood glucose a few times a month.   She checks blood glucose after meals.  Blood glucose is sometimes over 200 and never under 70. She is aware of hypoglycemia symptoms including shakiness.    She has no concerns regarding her diabetes at this time.   She is not experiencing numbness or burning in feet, excessive thirst, blurry vision, weight changes or redness, sores or blisters on feet. The patient has not had a diabetic eye exam in the last 12 months.          She eats 2-3 servings of fruits and vegetables daily.She consumes 0 sweetened beverage(s) daily.She exercises with enough effort to increase her heart rate 9 or less minutes per day.  She exercises with enough effort to increase her heart rate 3 or less days per week.   She is taking medications regularly.       Review of Systems:                                                      ROS: negative for fever, chills, cough, wheezes, chest pain, shortness of breath, vomiting, abdominal pain, leg swelling       OBJECTIVE:             Physical exam:  Blood pressure 139/88, pulse 84, temperature 97  F (36.1  C), temperature source Oral, height 1.657 m (5' 5.25\"), weight 64.4 kg (142 lb), last menstrual period 06/05/2017, SpO2 96%, not currently breastfeeding.     NAD, appears comfortable  Extremities: no edema, clubbing, cyanosis. Palpable pedal pulses bilaterally, Monofilament skin sensation is intact, no feet skin lesions   Neurologic: A, Ox3, no focal signs appreciated    PMHx: reviewed  Past Medical " History:   Diagnosis Date    Anxiety     Breast cancer (H)     Cancer (H)     Diabetes (H)     History of blood transfusion     Hyperlipidemia LDL goal <100     Hypertension     Papanicolaou smear of cervix with low grade squamous intraepithelial lesion (LGSIL) 08/18/2017 8/18/17 LSIL/Neg HPV. Plan: cotest in 1 year      PSHx: reviewed  Past Surgical History:   Procedure Laterality Date    BIOPSY      BREAST SURGERY      COLONOSCOPY N/A 03/11/2019    Procedure: COLONOSCOPY;  Surgeon: Nam Shah MD;  Location: [facility]    DILATION AND CURETTAGE, OPERATIVE HYSTEROSCOPY, COMBINED N/A 05/18/2018    Procedure: COMBINED DILATION AND CURETTAGE, OPERATIVE HYSTEROSCOPY;  Hysteroscopy, Dilation and Curettage;  Surgeon: Lashawn Hernandez MD;  Location: [facility] OR    GYN SURGERY      HYSTERECTOMY, PAP NO LONGER INDICATED      MASTECTOMY          Meds: reviewed  Current Outpatient Medications   Medication Sig Dispense Refill    apremilast (OTEZLA) 30 MG tablet Take 30 mg by mouth 2 times daily      empagliflozin (JARDIANCE) 10 MG TABS tablet Take 1 tablet (10 mg) by mouth daily 90 tablet 1    empagliflozin (JARDIANCE) 25 MG TABS tablet Take 1 tablet (25 mg) by mouth daily 90 tablet 1    letrozole (FEMARA) 2.5 MG tablet Take 1 tablet (2.5 mg) by mouth daily      metFORMIN (GLUCOPHAGE) 1000 MG tablet TAKE 1 TABLET TWICE A DAY WITH MEALS 180 tablet 1    rosuvastatin (CRESTOR) 20 MG tablet Take 1 tablet (20 mg) by mouth daily 90 tablet 1    sitagliptin (JANUVIA) 100 MG tablet TAKE 1 TABLET DAILY 90 tablet 1    Venlafaxine HCl (EFFEXOR PO) Take 50 mg by mouth daily      lisinopril (ZESTRIL) 10 MG tablet Take 1/2 (one-half) tablet by mouth once daily Strength: 10 mg 45 tablet 1    lisinopril (ZESTRIL) 5 MG tablet Take 1 tablet (5 mg) by mouth daily 90 tablet 1    metFORMIN (GLUCOPHAGE XR) 500 MG 24 hr tablet Take 2 tablets (1,000 mg) by mouth 2 times daily (with meals) (Patient not taking: Reported on 3/5/2024) 360 tablet 1        Soc Hx: reviewed  Fam Hx: reviewed      Chart documentation was completed, in part, with [employer] voice-recognition software. Even though reviewed, some grammatical, spelling, and word errors may remain.      Archana Salazar MD  Internal Medicine       Signed Electronically by: Archana Chen MD

## 2024-03-05 NOTE — PATIENT INSTRUCTIONS
Plan:  Add Glyburide 1.25 mg daily   2. Since you do not tolerate well Metformin at lunch time, take 1/2 tab = 500 mg at lunch time and 1000 - 1500 mg at dinner  3. Take the Lisinopril 5 mg daily. If the blood pressure goes below 11 and you feel lightheaded take only 1/2 tab = 2.5 mg daily  4. Mammogram ( please call [phone] to schedule it)   5. Please make a lab appointment for non fasting labs  in June - July  6. Please make an appointment few days after the labs to discuss about the results.

## 2024-03-20 ENCOUNTER — HOSPITAL ENCOUNTER (OUTPATIENT)
Dept: MAMMOGRAPHY | Facility: CLINIC | Age: 64
Discharge: HOME | End: 2024-03-20
Attending: INTERNAL MEDICINE | Admitting: INTERNAL MEDICINE
Payer: OTHER GOVERNMENT

## 2024-03-20 DIAGNOSIS — Z12.31 VISIT FOR SCREENING MAMMOGRAM: ICD-10-CM

## 2024-03-20 PROCEDURE — 77063 BREAST TOMOSYNTHESIS BI: CPT | Mod: 52

## 2024-06-17 PROBLEM — Z71.89 ADVANCED DIRECTIVES, COUNSELING/DISCUSSION: Status: RESOLVED | Noted: 2017-08-03 | Resolved: 2024-06-17

## 2024-06-20 ENCOUNTER — LAB (OUTPATIENT)
Dept: LAB | Facility: CLINIC | Age: 64
End: 2024-06-20
Payer: OTHER GOVERNMENT

## 2024-06-20 DIAGNOSIS — E11.65 UNCONTROLLED TYPE 2 DIABETES MELLITUS WITH HYPERGLYCEMIA (H): ICD-10-CM

## 2024-06-20 LAB — HBA1C MFR BLD: 7 % (ref 0–5.6)

## 2024-06-20 PROCEDURE — 36415 COLL VENOUS BLD VENIPUNCTURE: CPT

## 2024-06-20 PROCEDURE — 83036 HEMOGLOBIN GLYCOSYLATED A1C: CPT

## 2024-06-25 ENCOUNTER — OFFICE VISIT (OUTPATIENT)
Dept: INTERNAL MEDICINE | Facility: CLINIC | Age: 64
End: 2024-06-25
Payer: OTHER GOVERNMENT

## 2024-06-25 VITALS
RESPIRATION RATE: 18 BRPM | TEMPERATURE: 97 F | WEIGHT: 139 LBS | BODY MASS INDEX: 23.16 KG/M2 | OXYGEN SATURATION: 94 % | SYSTOLIC BLOOD PRESSURE: 128 MMHG | HEART RATE: 103 BPM | DIASTOLIC BLOOD PRESSURE: 68 MMHG | HEIGHT: 65 IN

## 2024-06-25 DIAGNOSIS — E11.65 UNCONTROLLED TYPE 2 DIABETES MELLITUS WITH HYPERGLYCEMIA (H): Primary | ICD-10-CM

## 2024-06-25 DIAGNOSIS — E78.5 HYPERLIPIDEMIA LDL GOAL <100: ICD-10-CM

## 2024-06-25 DIAGNOSIS — I10 HTN, GOAL BELOW 140/90: ICD-10-CM

## 2024-06-25 PROCEDURE — 99214 OFFICE O/P EST MOD 30 MIN: CPT | Performed by: INTERNAL MEDICINE

## 2024-06-25 RX ORDER — LISINOPRIL 5 MG/1
5 TABLET ORAL DAILY
Qty: 90 TABLET | Refills: 1 | Status: SHIPPED | OUTPATIENT
Start: 2024-06-25

## 2024-06-25 RX ORDER — ROSUVASTATIN CALCIUM 20 MG/1
20 TABLET, COATED ORAL DAILY
Qty: 90 TABLET | Refills: 1 | Status: SHIPPED | OUTPATIENT
Start: 2024-06-25

## 2024-06-25 ASSESSMENT — PAIN SCALES - GENERAL: PAINLEVEL: NO PAIN (0)

## 2024-06-25 NOTE — PATIENT INSTRUCTIONS
Plan:  Continue same meds, same doses for now   2. Schedule ANNUAL EXAM after oct 14  3. Please make a lab appointment for fasting labs  few days before  4. Eye exam

## 2024-06-25 NOTE — PROGRESS NOTES
Dr Salazar's note      Patient's instructions / PLAN:                                                        Plan:  Continue same meds, same doses for now   2. Schedule ANNUAL EXAM after oct 14  3. Please make a lab appointment for fasting labs  few days before  4. Eye exam        ASSESSMENT & PLAN:                                                        (E11.65) DM 2, no insulin (H)  Comment: Controlled    Plan: empagliflozin (JARDIANCE) 25 MG TABS tablet,         lisinopril (ZESTRIL) 5 MG tablet, metFORMIN         (GLUCOPHAGE) 1000 MG tablet, sitagliptin         (JANUVIA) 100 MG tablet, Hemoglobin A1c,         Comprehensive metabolic panel, Lipid panel         reflex to direct LDL Fasting, TSH with free T4         reflex, Albumin Random Urine Quantitative with         Creat Ratio, CBC with platelets            (I10) HTN, goal below 140/90  Comment: Controlled    Plan: lisinopril (ZESTRIL) 5 MG tablet, Hemoglobin         A1c, Comprehensive metabolic panel, Lipid panel        reflex to direct LDL Fasting, TSH with free T4         reflex, Albumin Random Urine Quantitative with         Creat Ratio, CBC with platelets            (E78.5) Hyperlipidemia LDL goal <100 #  Comment: Controlled  Plan: rosuvastatin (CRESTOR) 20 MG tablet, Hemoglobin        A1c, Comprehensive metabolic panel, Lipid panel        reflex to direct LDL Fasting, TSH with free T4         reflex, Albumin Random Urine Quantitative with         Creat Ratio, CBC with platelets               Chief complaint:                                                      Follow up chronic medical problems      SUBJECTIVE:                                                    History of present illness:    DM  -- Meds: Metformin 200, Jardiance 25, Januvia 100  -- she couldn't tolerate Glyburide 1.25: shaky feeling and hungry  -- watches the diet, lost diet        --       Subjective   Cheryl is a 63 year old, presenting for the following health issues:  Diabetes       6/25/2024     8:47 AM   Additional Questions   Roomed by Sita Aquino     [facility] [place]     Diabetes Follow-up    How often are you checking your blood sugar? Not at all  What concerns do you have today about your diabetes? None   Do you have any of these symptoms? (Select all that apply)  No numbness or tingling in feet.  No redness, sores or blisters on feet.  No complaints of excessive thirst.  No reports of blurry vision.  No significant changes to weight.  Have you had a diabetic eye exam in the last 12 months? No            Hyperlipidemia Follow-Up    Are you regularly taking any medication or supplement to lower your cholesterol?   Yes- rosuvastatin  Are you having muscle aches or other side effects that you think could be caused by your cholesterol lowering medication?  No    Hypertension Follow-up    Do you check your blood pressure regularly outside of the clinic? No   Are you following a low salt diet? Yes  Are your blood pressures ever more than 140 on the top number (systolic) OR more   than 90 on the bottom number (diastolic), for example 140/90? No    BP Readings from Last 2 Encounters:   03/05/24 139/88   10/13/23 120/70     Hemoglobin A1C (%)   Date Value   06/20/2024 7.0 (H)   02/27/2024 7.3 (H)   02/08/2021 7.0 (H)   10/01/2020 6.6 (H)     LDL Cholesterol Calculated (mg/dL)   Date Value   09/28/2023 101 (H)   10/24/2022 83   10/01/2020 107 (H)   02/18/2019 89     How many servings of fruits and vegetables do you eat daily?  0-1  On average, how many sweetened beverages do you drink each day (Examples: soda, juice, sweet tea, etc.  Do NOT count diet or artificially sweetened beverages)?   0  How many days per week do you exercise enough to make your heart beat faster? 3 or less  How many minutes a day do you exercise enough to make your heart beat faster? 9 or less  How many days per week do you miss taking your medication? 0        Review of Systems:                                                      ROS:  "negative for fever, chills, cough, wheezes, chest pain, shortness of breath, vomiting, abdominal pain, leg swelling       OBJECTIVE:             Physical exam:  Blood pressure (!) 141/74, pulse 103, temperature 97  F (36.1  C), temperature source Tympanic, resp. rate 18, height 1.657 m (5' 5.25\"), weight 63 kg (139 lb), last menstrual period 06/05/2017, SpO2 94%, not currently breastfeeding.   Rechecked: 128/68  NAD, appears comfortable  Skin: no rashes   Neck: supple, no JVD,  No thyroidmegaly. Lymph nodes nonpalpable cervical and supraclavicular.  Chest: clear to auscultation bilaterally, good respiratory effort  Heart: S1 S2, RRR, no mgr appreciated  Abdomen: soft, not tender, no hepatosplenomegaly or masses appreciated, no abdominal bruit, present bowel sounds  Extremities: no edema,   Neurologic: A, Ox3, no focal signs appreciated    PMHx: reviewed  Past Medical History:   Diagnosis Date    Anxiety     Breast cancer (H)     Cancer (H)     Diabetes (H)     History of blood transfusion     Hyperlipidemia LDL goal <100     Hypertension     Papanicolaou smear of cervix with low grade squamous intraepithelial lesion (LGSIL) 08/18/2017 8/18/17 LSIL/Neg HPV. Plan: cotest in 1 year      PSHx: reviewed  Past Surgical History:   Procedure Laterality Date    BIOPSY      BREAST SURGERY      COLONOSCOPY N/A 03/11/2019    Procedure: COLONOSCOPY;  Surgeon: Nam Shah MD;  Location:  GI    DILATION AND CURETTAGE, OPERATIVE HYSTEROSCOPY, COMBINED N/A 05/18/2018    Procedure: COMBINED DILATION AND CURETTAGE, OPERATIVE HYSTEROSCOPY;  Hysteroscopy, Dilation and Curettage;  Surgeon: Lashawn Hernandez MD;  Location: [facility] OR    GYN SURGERY      HYSTERECTOMY, PAP NO LONGER INDICATED      MASTECTOMY          Meds: reviewed  Current Outpatient Medications   Medication Sig Dispense Refill    apremilast (OTEZLA) 30 MG tablet Take 30 mg by mouth 2 times daily      empagliflozin (JARDIANCE) 25 MG TABS tablet Take 1 tablet " (25 mg) by mouth daily 90 tablet 1    glyBURIDE (DIABETA /MICRONASE) 1.25 MG tablet Take 1 tablet (1.25 mg) by mouth daily (with breakfast) 90 tablet 1    letrozole (FEMARA) 2.5 MG tablet Take 1 tablet (2.5 mg) by mouth daily      lisinopril (ZESTRIL) 5 MG tablet Take 1 tablet (5 mg) by mouth daily 90 tablet 1    metFORMIN (GLUCOPHAGE) 1000 MG tablet TAKE 1 TABLET TWICE A DAY WITH MEALS 180 tablet 1    rosuvastatin (CRESTOR) 20 MG tablet Take 1 tablet (20 mg) by mouth daily 90 tablet 1    sitagliptin (JANUVIA) 100 MG tablet TAKE 1 TABLET DAILY 90 tablet 1    Venlafaxine HCl (EFFEXOR PO) Take 50 mg by mouth daily         Soc Hx: reviewed  Fam Hx: reviewed      Chart documentation was completed, in part, with [employer] voice-recognition software. Even though reviewed, some grammatical, spelling, and word errors may remain.      Archana Salazar MD  Internal Medicine       Signed Electronically by: Archana Chen MD

## 2024-06-26 ENCOUNTER — TRANSFERRED RECORDS (OUTPATIENT)
Dept: HEALTH INFORMATION MANAGEMENT | Facility: CLINIC | Age: 64
End: 2024-06-26
Payer: OTHER GOVERNMENT

## 2024-10-14 ENCOUNTER — TRANSFERRED RECORDS (OUTPATIENT)
Dept: HEALTH INFORMATION MANAGEMENT | Facility: CLINIC | Age: 64
End: 2024-10-14
Payer: OTHER GOVERNMENT

## 2024-10-25 ENCOUNTER — LAB (OUTPATIENT)
Dept: LAB | Facility: CLINIC | Age: 64
End: 2024-10-25
Payer: OTHER GOVERNMENT

## 2024-10-25 DIAGNOSIS — E11.65 UNCONTROLLED TYPE 2 DIABETES MELLITUS WITH HYPERGLYCEMIA (H): ICD-10-CM

## 2024-10-25 DIAGNOSIS — I10 HTN, GOAL BELOW 140/90: ICD-10-CM

## 2024-10-25 DIAGNOSIS — E78.5 HYPERLIPIDEMIA LDL GOAL <100: ICD-10-CM

## 2024-10-25 LAB
ALBUMIN SERPL BCG-MCNC: 4.5 G/DL (ref 3.5–5.2)
ALP SERPL-CCNC: 77 U/L (ref 40–150)
ALT SERPL W P-5'-P-CCNC: 22 U/L (ref 0–50)
ANION GAP SERPL CALCULATED.3IONS-SCNC: 14 MMOL/L (ref 7–15)
AST SERPL W P-5'-P-CCNC: 23 U/L (ref 0–45)
BILIRUB SERPL-MCNC: 0.3 MG/DL
BUN SERPL-MCNC: 23.8 MG/DL (ref 8–23)
CALCIUM SERPL-MCNC: 9.9 MG/DL (ref 8.8–10.4)
CHLORIDE SERPL-SCNC: 104 MMOL/L (ref 98–107)
CHOLEST SERPL-MCNC: 186 MG/DL
CREAT SERPL-MCNC: 0.62 MG/DL (ref 0.51–0.95)
CREAT UR-MCNC: 81.9 MG/DL
EGFRCR SERPLBLD CKD-EPI 2021: >90 ML/MIN/1.73M2
ERYTHROCYTE [DISTWIDTH] IN BLOOD BY AUTOMATED COUNT: 12.7 % (ref 10–15)
EST. AVERAGE GLUCOSE BLD GHB EST-MCNC: 151 MG/DL
FASTING STATUS PATIENT QL REPORTED: YES
FASTING STATUS PATIENT QL REPORTED: YES
GLUCOSE SERPL-MCNC: 105 MG/DL (ref 70–99)
HBA1C MFR BLD: 6.9 % (ref 0–5.6)
HCO3 SERPL-SCNC: 23 MMOL/L (ref 22–29)
HCT VFR BLD AUTO: 43.7 % (ref 35–47)
HDLC SERPL-MCNC: 79 MG/DL
HGB BLD-MCNC: 14.3 G/DL (ref 11.7–15.7)
LDLC SERPL CALC-MCNC: 91 MG/DL
MCH RBC QN AUTO: 29.4 PG (ref 26.5–33)
MCHC RBC AUTO-ENTMCNC: 32.7 G/DL (ref 31.5–36.5)
MCV RBC AUTO: 90 FL (ref 78–100)
MICROALBUMIN UR-MCNC: <12 MG/L
MICROALBUMIN/CREAT UR: NORMAL MG/G{CREAT}
NONHDLC SERPL-MCNC: 107 MG/DL
PLATELET # BLD AUTO: 289 10E3/UL (ref 150–450)
POTASSIUM SERPL-SCNC: 4.3 MMOL/L (ref 3.4–5.3)
PROT SERPL-MCNC: 7.2 G/DL (ref 6.4–8.3)
RBC # BLD AUTO: 4.87 10E6/UL (ref 3.8–5.2)
SODIUM SERPL-SCNC: 141 MMOL/L (ref 135–145)
TRIGL SERPL-MCNC: 78 MG/DL
TSH SERPL DL<=0.005 MIU/L-ACNC: 2.02 UIU/ML (ref 0.3–4.2)
WBC # BLD AUTO: 5.4 10E3/UL (ref 4–11)

## 2024-10-25 PROCEDURE — 80061 LIPID PANEL: CPT

## 2024-10-25 PROCEDURE — 36415 COLL VENOUS BLD VENIPUNCTURE: CPT

## 2024-10-25 PROCEDURE — 84443 ASSAY THYROID STIM HORMONE: CPT

## 2024-10-25 PROCEDURE — 83036 HEMOGLOBIN GLYCOSYLATED A1C: CPT

## 2024-10-25 PROCEDURE — 80053 COMPREHEN METABOLIC PANEL: CPT

## 2024-10-25 PROCEDURE — 82570 ASSAY OF URINE CREATININE: CPT

## 2024-10-25 PROCEDURE — 82043 UR ALBUMIN QUANTITATIVE: CPT

## 2024-10-25 PROCEDURE — 85027 COMPLETE CBC AUTOMATED: CPT

## 2024-10-27 ASSESSMENT — PATIENT HEALTH QUESTIONNAIRE - PHQ9: SUM OF ALL RESPONSES TO PHQ QUESTIONS 1-9: 3

## 2024-10-31 ENCOUNTER — OFFICE VISIT (OUTPATIENT)
Dept: INTERNAL MEDICINE | Facility: CLINIC | Age: 64
End: 2024-10-31
Payer: OTHER GOVERNMENT

## 2024-10-31 VITALS
OXYGEN SATURATION: 99 % | BODY MASS INDEX: 22.66 KG/M2 | HEART RATE: 79 BPM | TEMPERATURE: 97.6 F | RESPIRATION RATE: 12 BRPM | DIASTOLIC BLOOD PRESSURE: 76 MMHG | SYSTOLIC BLOOD PRESSURE: 118 MMHG | HEIGHT: 65 IN | WEIGHT: 136 LBS

## 2024-10-31 DIAGNOSIS — E11.65 UNCONTROLLED TYPE 2 DIABETES MELLITUS WITH HYPERGLYCEMIA (H): ICD-10-CM

## 2024-10-31 DIAGNOSIS — I10 HTN, GOAL BELOW 140/90: ICD-10-CM

## 2024-10-31 DIAGNOSIS — E78.5 HYPERLIPIDEMIA LDL GOAL <100: ICD-10-CM

## 2024-10-31 DIAGNOSIS — Z00.00 ROUTINE GENERAL MEDICAL EXAMINATION AT A HEALTH CARE FACILITY: Primary | ICD-10-CM

## 2024-10-31 PROCEDURE — 99396 PREV VISIT EST AGE 40-64: CPT | Performed by: INTERNAL MEDICINE

## 2024-10-31 PROCEDURE — 99214 OFFICE O/P EST MOD 30 MIN: CPT | Mod: 25 | Performed by: INTERNAL MEDICINE

## 2024-10-31 RX ORDER — LISINOPRIL 5 MG/1
5 TABLET ORAL DAILY
Qty: 90 TABLET | Refills: 1 | Status: SHIPPED | OUTPATIENT
Start: 2024-10-31

## 2024-10-31 RX ORDER — ROSUVASTATIN CALCIUM 20 MG/1
20 TABLET, COATED ORAL DAILY
Qty: 90 TABLET | Refills: 1 | Status: SHIPPED | OUTPATIENT
Start: 2024-10-31

## 2024-10-31 RX ORDER — AZELAIC ACID 0.15 G/G
GEL TOPICAL
COMMUNITY
Start: 2024-10-09

## 2024-10-31 SDOH — HEALTH STABILITY: PHYSICAL HEALTH: ON AVERAGE, HOW MANY DAYS PER WEEK DO YOU ENGAGE IN MODERATE TO STRENUOUS EXERCISE (LIKE A BRISK WALK)?: 3 DAYS

## 2024-10-31 ASSESSMENT — SOCIAL DETERMINANTS OF HEALTH (SDOH): HOW OFTEN DO YOU GET TOGETHER WITH FRIENDS OR RELATIVES?: ONCE A WEEK

## 2024-10-31 ASSESSMENT — PATIENT HEALTH QUESTIONNAIRE - PHQ9
10. IF YOU CHECKED OFF ANY PROBLEMS, HOW DIFFICULT HAVE THESE PROBLEMS MADE IT FOR YOU TO DO YOUR WORK, TAKE CARE OF THINGS AT HOME, OR GET ALONG WITH OTHER PEOPLE: SOMEWHAT DIFFICULT
SUM OF ALL RESPONSES TO PHQ QUESTIONS 1-9: 2
SUM OF ALL RESPONSES TO PHQ QUESTIONS 1-9: 2

## 2024-10-31 NOTE — NURSING NOTE
"Chief Complaint   Patient presents with    Physical     Labs done     initial /76   Pulse 79   Temp 97.6  F (36.4  C) (Tympanic)   Resp 12   Ht 1.657 m (5' 5.25\")   Wt 61.7 kg (136 lb)   LMP 06/05/2017 (Exact Date)   SpO2 99%   BMI 22.46 kg/m   Estimated body mass index is 22.46 kg/m  as calculated from the following:    Height as of this encounter: 1.657 m (5' 5.25\").    Weight as of this encounter: 61.7 kg (136 lb)..  bp completed using cuff size regular  GRAY EDMONDSON LPN  "

## 2024-10-31 NOTE — PATIENT INSTRUCTIONS
Plan:  Continue same meds, same doses for now   2. Follow up appointment in about 6 month -- video ok  3. Please make a lab appointment for non fasting labs  few days before  4. Need annual eye exam

## 2024-10-31 NOTE — PROGRESS NOTES
Dr Salazar's note    Patient's instructions / PLAN:                                                        Plan:  Continue same meds, same doses for now   2. Follow up appointment in about 6 month -- video ok  3. Please make a lab appointment for non fasting labs  few days before  4. Need annual eye exam         ASSESSMENT & PLAN:                                                      (Z00.00) Routine general medical examination at a health care facility  (primary encounter diagnosis)  Comment:   Plan:     (E11.65) DM 2, no insulin (H)  Comment: Controlled    Plan: empagliflozin (JARDIANCE) 25 MG TABS tablet,         lisinopril (ZESTRIL) 5 MG tablet, metFORMIN         (GLUCOPHAGE) 1000 MG tablet, sitagliptin         (JANUVIA) 100 MG tablet, Hemoglobin A1c            (I10) HTN, goal below 140/90  Comment: Controlled    Plan: lisinopril (ZESTRIL) 5 MG tablet            (E78.5) Hyperlipidemia LDL goal <100 #  Comment: Controlled    Plan: rosuvastatin (CRESTOR) 20 MG tablet               Chief Complaint:                                                        Annual exam  Follow up chronic medical problems      SUBJECTIVE:                                                    History of present illness     We reviewed the chronic medical problems as above.   I reviewed the recent tests results in Epic       ROS:                                                      ROS: negative for fever, chills, cough, wheezes, chest pain, shortness of breath, vomiting, abdominal pain, leg swelling     PMHx: - reviewed  Past Medical History:   Diagnosis Date    Anxiety     Breast cancer (H)     Cancer (H)     Diabetes (H)     History of blood transfusion     Hyperlipidemia LDL goal <100     Hypertension     Papanicolaou smear of cervix with low grade squamous intraepithelial lesion (LGSIL) 08/18/2017 8/18/17 LSIL/Neg HPV. Plan: cotest in 1 year       PSHx: reviewed  Past Surgical History:   Procedure Laterality Date    BIOPSY      BREAST  SURGERY      COLONOSCOPY N/A 2019    Procedure: COLONOSCOPY;  Surgeon: Nam Shah MD;  Location: [facility]    DILATION AND CURETTAGE, OPERATIVE HYSTEROSCOPY, COMBINED N/A 2018    Procedure: COMBINED DILATION AND CURETTAGE, OPERATIVE HYSTEROSCOPY;  Hysteroscopy, Dilation and Curettage;  Surgeon: Lashawn Hernandez MD;  Location: [facility] OR    GYN SURGERY      HYSTERECTOMY, PAP NO LONGER INDICATED      MASTECTOMY          Soc Hx: No daily alcohol, no smoking  Social History     Socioeconomic History    Marital status:      Spouse name: Not on file    Number of children: Not on file    Years of education: Not on file    Highest education level: Not on file   Occupational History    Not on file   Tobacco Use    Smoking status: Former     Current packs/day: 0.00     Types: Cigarettes     Quit date: 8/3/1997     Years since quittin.2     Passive exposure: Past    Smokeless tobacco: Never   Vaping Use    Vaping status: Never Used   Substance and Sexual Activity    Alcohol use: Yes     Comment: socially    Drug use: No    Sexual activity: Yes     Partners: Male   Other Topics Concern    Parent/sibling w/ CABG, MI or angioplasty before 65F 55M? Not Asked   Social History Narrative    Not on file     Social Drivers of Health     Financial Resource Strain: Low Risk  (10/31/2024)    Financial Resource Strain     Within the past 12 months, have you or your family members you live with been unable to get utilities (heat, electricity) when it was really needed?: No   Food Insecurity: Low Risk  (10/31/2024)    Food Insecurity     Within the past 12 months, did you worry that your food would run out before you got money to buy more?: No     Within the past 12 months, did the food you bought just not last and you didn t have money to get more?: No   Transportation Needs: Low Risk  (10/31/2024)    Transportation Needs     Within the past 12 months, has lack of transportation kept you from medical  appointments, getting your medicines, non-medical meetings or appointments, work, or from getting things that you need?: No   Physical Activity: Unknown (10/31/2024)    Exercise Vital Sign     Days of Exercise per Week: 3 days     Minutes of Exercise per Session: Not on file   Stress: No Stress Concern Present (10/31/2024)    [employer] - Occupational Stress Questionnaire     Feeling of Stress : Not at all   Social Connections: Unknown (10/31/2024)    Social Connection and Isolation Panel [NHANES]     Frequency of Communication with Friends and Family: Not on file     Frequency of Social Gatherings with Friends and Family: Once a week     Attends Oriental orthodox Services: Not on file     Active Member of Clubs or Organizations: Not on file     Attends Club or Organization Meetings: Not on file     Marital Status: Not on file   Interpersonal Safety: Low Risk  (10/31/2024)    Interpersonal Safety     Do you feel physically and emotionally safe where you currently live?: Yes     Within the past 12 months, have you been hit, slapped, kicked or otherwise physically hurt by someone?: No     Within the past 12 months, have you been humiliated or emotionally abused in other ways by your partner or ex-partner?: No   Housing Stability: Low Risk  (10/31/2024)    Housing Stability     Do you have housing? : Yes     Are you worried about losing your housing?: No        Fam Hx: reviewed  Family History   Problem Relation Age of Onset    Other - See Comments Mother         hysterectomy for benign    Diabetes Father          during bilateral leg amputation     Other - See Comments Sister         hysterectomy for benign    Myocardial Infarction Maternal Grandmother 40    Kidney Disease Paternal Grandmother         On dialysis    Cardiac Sudden Death Paternal Grandfather 60         during angiogram    Diabetes Brother 40    Breast Cancer No family hx of     Colon Cancer No family hx of     Ovarian  "Cancer No family hx of          Screening: reviewed      All: reviewed    Meds: reviewed  Current Outpatient Medications   Medication Sig Dispense Refill    apremilast (OTEZLA) 30 MG tablet Take 30 mg by mouth 2 times daily      azelaic acid (FINACIA) 15 % external gel       empagliflozin (JARDIANCE) 25 MG TABS tablet Take 1 tablet (25 mg) by mouth daily 90 tablet 1    lisinopril (ZESTRIL) 5 MG tablet Take 1 tablet (5 mg) by mouth daily 90 tablet 1    metFORMIN (GLUCOPHAGE) 1000 MG tablet TAKE 1 TABLET TWICE A DAY WITH MEALS 180 tablet 1    rosuvastatin (CRESTOR) 20 MG tablet Take 1 tablet (20 mg) by mouth daily 90 tablet 1    sitagliptin (JANUVIA) 100 MG tablet TAKE 1 TABLET DAILY 90 tablet 1    Venlafaxine HCl (EFFEXOR PO) Take 50 mg by mouth daily      letrozole (FEMARA) 2.5 MG tablet Take 1 tablet (2.5 mg) by mouth daily         OBJECTIVE:                                                    Physical Exam :  Blood pressure 118/76, pulse 79, temperature 97.6  F (36.4  C), temperature source Tympanic, resp. rate 12, height 1.657 m (5' 5.25\"), weight 61.7 kg (136 lb), last menstrual period 06/05/2017, SpO2 99%, not currently breastfeeding.   NAD, appears comfortable  Skin clear, no rashes  Neck: supple, no JVD,  no thyroidmegaly  Lymph nodes non palpable in the cervical, supraclavicular axillaries,   Chest: clear to auscultation with good respiratory effort  Cardiac: S1S2, RRR, no mgr appreciated  Abdomen: soft, not tender, not distended, audible bowel sound, no hepatosplenomegaly, no palpable masses, no abdominal bruits  Extremities: no cyanosis, clubbing or edema.   Neuro: A, Ox3, no focal signs.  Breast exam deferred    Pelvic exam: deferred, s/p hysterectomy        Patient has been advised of split billing requirements and indicates understanding: Yes.  At the check in, at the      Archana Salazar MD  Internal Medicine        ###########################    Preventive Care Visit  [facility] " NADIA Chen MD, Internal Medicine  Oct 31, 2024          Jihan Luna is a 63 year old, presenting for the following:  Physical (Labs done)        10/31/2024     7:22 AM   Additional Questions   Roomed by Maribell ABDUL          HPI      Health Care Directive  Patient does not have a Health Care Directive: Discussed advance care planning with patient; however, patient declined at this time.      10/31/2024   General Health   How would you rate your overall physical health? Good   Feel stress (tense, anxious, or unable to sleep) Not at all            10/31/2024   Nutrition   Three or more servings of calcium each day? Yes   Diet: Diabetic   How many servings of fruit and vegetables per day? (!) 0-1   How many sweetened beverages each day? 0-1            10/31/2024   Exercise   Days per week of moderate/strenous exercise 3 days            10/31/2024   Social Factors   Frequency of gathering with friends or relatives Once a week   Worry food won't last until get money to buy more No   Food not last or not have enough money for food? No   Do you have housing? (Housing is defined as stable permanent housing and does not include staying ouside in a car, in a tent, in an abandoned building, in an overnight shelter, or couch-surfing.) Yes   Are you worried about losing your housing? No   Lack of transportation? No   Unable to get utilities (heat,electricity)? No            10/31/2024   Fall Risk   Fallen 2 or more times in the past year? No    Trouble with walking or balance? No        Patient-reported          10/31/2024   Dental   Dentist two times every year? (!) NO            10/31/2024   TB Screening   Were you born outside of the [place]? No          Today's PHQ-9 Score:       10/31/2024     7:19 AM   PHQ-9 SCORE   PHQ-9 Total Score MyChart 2 (Minimal depression)   PHQ-9 Total Score 2        Patient-reported         10/31/2024   Substance Use   Alcohol more than 3/day or more than 7/wk No  "  Do you use any other substances recreationally? No        Social History     Tobacco Use    Smoking status: Former     Current packs/day: 0.00     Types: Cigarettes     Quit date: 8/3/1997     Years since quittin.2     Passive exposure: Past    Smokeless tobacco: Never   Vaping Use    Vaping status: Never Used   Substance Use Topics    Alcohol use: Yes     Comment: socially    Drug use: No           3/20/2024   LAST FHS-7 RESULTS   1st degree relative breast or ovarian cancer No   Any relative bilateral breast cancer No   Any male have breast cancer No   Any ONE woman have BOTH breast AND ovarian cancer No   Any woman with breast cancer before 50yrs No   2 or more relatives with breast AND/OR ovarian cancer No   2 or more relatives with breast AND/OR bowel cancer No                   10/31/2024   STI Screening   New sexual partner(s) since last STI/HIV test? No        History of abnormal Pap smear:         Latest Ref Rng & Units 2019     2:35 PM 2019     1:57 PM 2017    10:47 AM   PAP / HPV   PAP (Historical)  NIL   LSIL    HPV 16 DNA NEG^Negative  Negative     HPV 18 DNA NEG^Negative  Negative     Other HR HPV NEG^Negative  Negative       ASCVD Risk   The 10-year ASCVD risk score (Emile ZELAYA, et al., 2019) is: 6.8%    Values used to calculate the score:      Age: 63 years      Sex: Female      Is Non- : No      Diabetic: Yes      Tobacco smoker: No      Systolic Blood Pressure: 118 mmHg      Is BP treated: Yes      HDL Cholesterol: 79 mg/dL      Total Cholesterol: 186 mg/dL           Reviewed and updated as needed this visit by Provider                             Objective    Exam  /76   Pulse 79   Temp 97.6  F (36.4  C) (Tympanic)   Resp 12   Ht 1.657 m (5' 5.25\")   Wt 61.7 kg (136 lb)   LMP 2017 (Exact Date)   SpO2 99%   BMI 22.46 kg/m     Estimated body mass index is 22.46 kg/m  as calculated from the following:    Height as of this " "encounter: 1.657 m (5' 5.25\").    Weight as of this encounter: 61.7 kg (136 lb).    Physical Exam          Signed Electronically by: Archana Chen MD    Answers submitted by the patient for this visit:  Patient Health Questionnaire (Submitted on 10/31/2024)  If you checked off any problems, how difficult have these problems made it for you to do your work, take care of things at home, or get along with other people?: Somewhat difficult  PHQ9 TOTAL SCORE: 2    "

## 2024-12-18 ENCOUNTER — APPOINTMENT (OUTPATIENT)
Age: 64
Setting detail: DERMATOLOGY
End: 2024-12-18

## 2024-12-18 DIAGNOSIS — L71.8 OTHER ROSACEA: ICD-10-CM

## 2024-12-18 DIAGNOSIS — L40.0 PSORIASIS VULGARIS: ICD-10-CM

## 2024-12-18 PROCEDURE — ? COUNSELING

## 2024-12-18 PROCEDURE — ? ADDITIONAL NOTES

## 2024-12-18 PROCEDURE — ? PRESCRIPTION MEDICATION MANAGEMENT

## 2024-12-18 PROCEDURE — 99214 OFFICE O/P EST MOD 30 MIN: CPT

## 2024-12-18 ASSESSMENT — BSA PSORIASIS: % BODY COVERED IN PSORIASIS: 0

## 2024-12-18 ASSESSMENT — ITCH NUMERIC RATING SCALE: HOW SEVERE IS YOUR ITCHING?: 0

## 2024-12-18 NOTE — PROCEDURE: PRESCRIPTION MEDICATION MANAGEMENT
Detail Level: Zone
Render In Strict Bullet Format?: No
Continue Regimen: Otezla 30 mg twice daily
Continue Regimen: Azelaic acid 15% topical gel twice daily

## 2024-12-18 NOTE — HPI: RASH (ROSACEA)
How Severe Is Your Rosacea?: moderate
Is This A New Presentation, Or A Follow-Up?: Follow Up Rosacea
Additional History: Patient notes recent flare attributed to change in weather

## 2024-12-18 NOTE — HPI: RASH (PSORIASIS)
How Severe Is Your Psoriasis?: moderate
Do You Have A Family History Of Psoriasis?: no
Is This A New Presentation, Or A Follow-Up?: Follow Up Psoriasis
Additional History: Patient notes she recently moved and [employer] shipment went to old address about 3 weeks ago. She has been unable to refill prescription since. Denies signs of depression and denies GI upset.

## 2024-12-23 ENCOUNTER — RX ONLY (RX ONLY)
Age: 64
End: 2024-12-23

## 2024-12-23 RX ORDER — APREMILAST 30 MG/1
TABLET, FILM COATED ORAL
Qty: 180 | Refills: 1 | Status: ERX | COMMUNITY
Start: 2024-12-23

## 2025-02-03 DIAGNOSIS — E11.65 UNCONTROLLED TYPE 2 DIABETES MELLITUS WITH HYPERGLYCEMIA (H): ICD-10-CM

## 2025-02-03 RX ORDER — EMPAGLIFLOZIN 25 MG/1
25 TABLET, FILM COATED ORAL DAILY
Qty: 90 TABLET | Refills: 3 | OUTPATIENT
Start: 2025-02-03

## 2025-02-19 DIAGNOSIS — E11.65 UNCONTROLLED TYPE 2 DIABETES MELLITUS WITH HYPERGLYCEMIA (H): ICD-10-CM

## 2025-02-19 DIAGNOSIS — I10 HTN, GOAL BELOW 140/90: ICD-10-CM

## 2025-02-20 RX ORDER — EMPAGLIFLOZIN 25 MG/1
25 TABLET, FILM COATED ORAL DAILY
Qty: 90 TABLET | Refills: 3 | OUTPATIENT
Start: 2025-02-20

## 2025-02-20 RX ORDER — LISINOPRIL 5 MG/1
5 TABLET ORAL DAILY
Qty: 90 TABLET | Refills: 3 | OUTPATIENT
Start: 2025-02-20

## 2025-03-24 ENCOUNTER — TELEPHONE (OUTPATIENT)
Dept: INTERNAL MEDICINE | Facility: CLINIC | Age: 65
End: 2025-03-24
Payer: COMMERCIAL

## 2025-03-24 NOTE — TELEPHONE ENCOUNTER
Please ask the patient info about the last eye exam.     She has carlita in April     According to the [place] [employer], the patient is due for the following items:

## 2025-04-29 ENCOUNTER — OFFICE VISIT (OUTPATIENT)
Dept: INTERNAL MEDICINE | Facility: CLINIC | Age: 65
End: 2025-04-29
Payer: COMMERCIAL

## 2025-04-29 ENCOUNTER — TRANSFERRED RECORDS (OUTPATIENT)
Dept: MULTI SPECIALTY CLINIC | Facility: CLINIC | Age: 65
End: 2025-04-29

## 2025-04-29 VITALS
HEIGHT: 65 IN | HEART RATE: 92 BPM | WEIGHT: 141 LBS | DIASTOLIC BLOOD PRESSURE: 76 MMHG | RESPIRATION RATE: 12 BRPM | SYSTOLIC BLOOD PRESSURE: 128 MMHG | BODY MASS INDEX: 23.49 KG/M2 | TEMPERATURE: 97.7 F | OXYGEN SATURATION: 96 %

## 2025-04-29 DIAGNOSIS — E11.65 UNCONTROLLED TYPE 2 DIABETES MELLITUS WITH HYPERGLYCEMIA (H): ICD-10-CM

## 2025-04-29 DIAGNOSIS — I10 HTN, GOAL BELOW 140/90: ICD-10-CM

## 2025-04-29 DIAGNOSIS — E78.5 HYPERLIPIDEMIA LDL GOAL <100: ICD-10-CM

## 2025-04-29 DIAGNOSIS — E11.9 TYPE 2 DIABETES MELLITUS WITHOUT COMPLICATION, WITHOUT LONG-TERM CURRENT USE OF INSULIN (H): Primary | ICD-10-CM

## 2025-04-29 DIAGNOSIS — R04.0 EPISTAXIS: ICD-10-CM

## 2025-04-29 LAB — RETINOPATHY: NEGATIVE

## 2025-04-29 PROCEDURE — 99207 PR FOOT EXAM NO CHARGE: CPT | Performed by: INTERNAL MEDICINE

## 2025-04-29 PROCEDURE — 3078F DIAST BP <80 MM HG: CPT | Performed by: INTERNAL MEDICINE

## 2025-04-29 PROCEDURE — 99214 OFFICE O/P EST MOD 30 MIN: CPT | Performed by: INTERNAL MEDICINE

## 2025-04-29 PROCEDURE — 3074F SYST BP LT 130 MM HG: CPT | Performed by: INTERNAL MEDICINE

## 2025-04-29 RX ORDER — LISINOPRIL 5 MG/1
5 TABLET ORAL DAILY
Qty: 90 TABLET | Refills: 2 | Status: SHIPPED | OUTPATIENT
Start: 2025-04-29

## 2025-04-29 RX ORDER — ROSUVASTATIN CALCIUM 20 MG/1
20 TABLET, COATED ORAL DAILY
Qty: 90 TABLET | Refills: 2 | Status: SHIPPED | OUTPATIENT
Start: 2025-04-29

## 2025-04-29 ASSESSMENT — PATIENT HEALTH QUESTIONNAIRE - PHQ9: SUM OF ALL RESPONSES TO PHQ QUESTIONS 1-9: 1

## 2025-04-29 NOTE — PATIENT INSTRUCTIONS
Plan:  Continue same meds, same doses for now   2.  Next ANNUAL EXAM after Nov 1, 2025  3. ENT referral if nose bleedings persist

## 2025-04-29 NOTE — Clinical Note
CareEverywhere/Patient reports: -- eye exam done on April 29 with [facility] in [place]. It was normal . Info sent to [facility]

## 2025-04-29 NOTE — PROGRESS NOTES
Patient's instructions / PLAN:                                                        Plan:  Continue same meds, same doses for now   2.  Next ANNUAL EXAM after Nov 1, 2025  3. ENT referral if nose bleedings persist      ASSESSMENT & PLAN:                                                      (E11.9) Type 2 diabetes mellitus without complication, without long-term current use of insulin (H)  (primary encounter diagnosis)  Comment:   Plan: Lipid panel reflex to direct LDL Fasting,         Comprehensive metabolic panel, CBC with         platelets, Hemoglobin A1c, TSH with free T4         reflex, Albumin Random Urine Quantitative with         Creat Ratio, CK total          (I10) HTN, goal below 140/90  Comment: Controlled    Plan: lisinopril (ZESTRIL) 5 MG tablet, Lipid panel         reflex to direct LDL Fasting, Comprehensive         metabolic panel, CBC with platelets, Hemoglobin        A1c, TSH with free T4 reflex, Albumin Random         Urine Quantitative with Creat Ratio, CK total            (E78.5) Hyperlipidemia LDL goal <100 #  Comment:   Plan: rosuvastatin (CRESTOR) 20 MG tablet, Lipid         panel reflex to direct LDL Fasting,         Comprehensive metabolic panel, CBC with         platelets, Hemoglobin A1c, TSH with free T4         reflex, Albumin Random Urine Quantitative with         Creat Ratio, CK total           Epistaxis     Chief Complaint:                                                      Follow up chronic medical problems        SUBJECTIVE:                                                    History of present illness     DM  -- A1c 7.2<--6.9<--7.0  -- she run out of Jardiance for few weeks     Epistaxis  -- x 3. No abn gum bleeding. No petechia   -- she will see ENT if epistaxis persists    CareEverywhere/Patient reports:  -- eye exam done on April [facility] in [place]. It was normal . Info sent to abstraction      ROS:                                                   "    ROS: negative for fever, chills, cough, wheezes, chest pain, shortness of breath, vomiting, abdominal pain, leg swelling     OBJECTIVE:                                                    Physical Exam :    Blood pressure 128/76, pulse 92, temperature 97.7  F (36.5  C), temperature source Tympanic, resp. rate 12, height 1.657 m (5' 5.25\"), weight 64 kg (141 lb), last menstrual period 06/05/2017, SpO2 96%, not currently breastfeeding.   NAD, appears comfortable  Neck: supple, no JVD, No thyroidmegaly. Lymph nodes nonpalpable cervical and supraclavicular.  Chest: clear to auscultation bilaterally, good respiratory effort  Heart: S1 S2, RRR, no mgr appreciated  Abdomen: soft, not tender,  Extremities: No cyanosis, clubbing No edema. Good pedal pulses. No skin lesions. Monofilament skin sensation is intact   Neurologic: A, Ox3, no focal signs appreciated    PMHx: reviewed  Past Medical History:   Diagnosis Date    Anxiety     Breast cancer (H)     Cancer (H)     Diabetes (H)     History of blood transfusion     Hyperlipidemia LDL goal <100     Hypertension     Papanicolaou smear of cervix with low grade squamous intraepithelial lesion (LGSIL) 08/18/2017 8/18/17 LSIL/Neg HPV. Plan: cotest in 1 year      PSHx: reviewed  Past Surgical History:   Procedure Laterality Date    BIOPSY      BREAST SURGERY      COLONOSCOPY N/A 03/11/2019    Procedure: COLONOSCOPY;  Surgeon: Nam Shah MD;  Location: [facility]    DILATION AND CURETTAGE, OPERATIVE HYSTEROSCOPY, COMBINED N/A 05/18/2018    Procedure: COMBINED DILATION AND CURETTAGE, OPERATIVE HYSTEROSCOPY;  Hysteroscopy, Dilation and Curettage;  Surgeon: Lashawn Hernandez MD;  Location: [facility]    GYN SURGERY      HYSTERECTOMY, PAP NO LONGER INDICATED      MASTECTOMY          Meds: reviewed  Current Outpatient Medications   Medication Sig Dispense Refill    apremilast (OTEZLA) 30 MG tablet Take 30 mg by mouth 2 times daily      azelaic acid (FINACIA) 15 % external gel   "     lisinopril (ZESTRIL) 5 MG tablet Take 1 tablet (5 mg) by mouth daily. 90 tablet 1    metFORMIN (GLUCOPHAGE) 1000 MG tablet TAKE 1 TABLET TWICE A DAY WITH MEALS 180 tablet 1    rosuvastatin (CRESTOR) 20 MG tablet Take 1 tablet (20 mg) by mouth daily. 90 tablet 1    sitagliptin (JANUVIA) 100 MG tablet TAKE 1 TABLET DAILY 90 tablet 1    Venlafaxine HCl (EFFEXOR PO) Take 50 mg by mouth daily      empagliflozin (JARDIANCE) 25 MG TABS tablet Take 1 tablet (25 mg) by mouth daily. (Patient not taking: Reported on 4/29/2025) 90 tablet 1    letrozole (FEMARA) 2.5 MG tablet Take 1 tablet (2.5 mg) by mouth daily         Soc Hx: reviewed  Fam Hx: reviewed      Chart documentation was completed, in part, with [employer] voice-recognition software. Even though reviewed, some grammatical, spelling, and word errors may remain.      Archana Salazar MD  Internal Medicine

## 2025-04-29 NOTE — NURSING NOTE
"Chief Complaint   Patient presents with    RECHECK     initial /76   Pulse 92   Temp 97.7  F (36.5  C) (Tympanic)   Resp 12   Ht 1.657 m (5' 5.25\")   Wt 64 kg (141 lb)   LMP 06/05/2017 (Exact Date)   SpO2 96%   BMI 23.28 kg/m   Estimated body mass index is 23.28 kg/m  as calculated from the following:    Height as of this encounter: 1.657 m (5' 5.25\").    Weight as of this encounter: 64 kg (141 lb)..  bp completed using cuff size regular  GRAY EDMONDSON LPN  "

## 2025-06-07 ENCOUNTER — HEALTH MAINTENANCE LETTER (OUTPATIENT)
Age: 65
End: 2025-06-07

## 2025-06-23 ENCOUNTER — APPOINTMENT (OUTPATIENT)
Age: 65
Setting detail: DERMATOLOGY
End: 2025-06-23

## 2025-06-23 DIAGNOSIS — L82.1 OTHER SEBORRHEIC KERATOSIS: ICD-10-CM

## 2025-06-23 DIAGNOSIS — D22 MELANOCYTIC NEVI: ICD-10-CM

## 2025-06-23 DIAGNOSIS — L40.0 PSORIASIS VULGARIS: ICD-10-CM | Status: STABLE

## 2025-06-23 DIAGNOSIS — L71.8 OTHER ROSACEA: ICD-10-CM | Status: STABLE

## 2025-06-23 DIAGNOSIS — D18.0 HEMANGIOMA: ICD-10-CM

## 2025-06-23 DIAGNOSIS — Z71.89 OTHER SPECIFIED COUNSELING: ICD-10-CM

## 2025-06-23 DIAGNOSIS — L90.5 SCAR CONDITIONS AND FIBROSIS OF SKIN: ICD-10-CM

## 2025-06-23 PROBLEM — D18.01 HEMANGIOMA OF SKIN AND SUBCUTANEOUS TISSUE: Status: ACTIVE | Noted: 2025-06-23

## 2025-06-23 PROBLEM — D22.5 MELANOCYTIC NEVI OF TRUNK: Status: ACTIVE | Noted: 2025-06-23

## 2025-06-23 PROCEDURE — ? PRESCRIPTION MEDICATION MANAGEMENT

## 2025-06-23 PROCEDURE — ? COUNSELING

## 2025-06-23 PROCEDURE — ? SUNSCREEN RECOMMENDATIONS

## 2025-06-23 PROCEDURE — ? ADDITIONAL NOTES

## 2025-06-23 ASSESSMENT — LOCATION SIMPLE DESCRIPTION DERM
LOCATION SIMPLE: CHEST
LOCATION SIMPLE: UPPER BACK
LOCATION SIMPLE: RIGHT TEMPLE
LOCATION SIMPLE: LEFT FOREHEAD
LOCATION SIMPLE: LEFT ANKLE
LOCATION SIMPLE: RIGHT LOWER BACK
LOCATION SIMPLE: ABDOMEN

## 2025-06-23 ASSESSMENT — LOCATION DETAILED DESCRIPTION DERM
LOCATION DETAILED: SUPERIOR THORACIC SPINE
LOCATION DETAILED: LEFT ANKLE
LOCATION DETAILED: INFERIOR THORACIC SPINE
LOCATION DETAILED: MIDDLE STERNUM
LOCATION DETAILED: EPIGASTRIC SKIN
LOCATION DETAILED: RIGHT LATERAL TEMPLE
LOCATION DETAILED: RIGHT SUPERIOR MEDIAL MIDBACK
LOCATION DETAILED: LEFT INFERIOR MEDIAL FOREHEAD

## 2025-06-23 ASSESSMENT — LOCATION ZONE DERM
LOCATION ZONE: TRUNK
LOCATION ZONE: LEG
LOCATION ZONE: FACE

## 2025-06-23 ASSESSMENT — BSA PSORIASIS: % BODY COVERED IN PSORIASIS: 0

## 2025-06-23 ASSESSMENT — ITCH NUMERIC RATING SCALE: HOW SEVERE IS YOUR ITCHING?: 0

## 2025-06-23 NOTE — PROCEDURE: PRESCRIPTION MEDICATION MANAGEMENT
Detail Level: Zone
Plan: She denies any nausea or history of depression.
Render In Strict Bullet Format?: No
Continue Regimen: Otezla 30 mg twice daily. She does believe she has an active script. No refills sent today.
Continue Regimen: Azelaic acid 15% topical gel twice daily. She states that she has this on hand and deferred a refill at this time.

## 2025-06-23 NOTE — HPI: RASH (PSORIASIS)
How Severe Is Your Psoriasis?: moderate
Do You Have A Family History Of Psoriasis?: no
Is This A New Presentation, Or A Follow-Up?: Follow Up Psoriasis
Additional History: Patient states that she is clear and well controlled. Denies signs of depression and denies GI upset.

## 2025-06-23 NOTE — PROCEDURE: ADDITIONAL NOTES
Sudden numbness or weakness of the face, arm, or leg, especially on one side of the body. Confusion, trouble speaking or understanding. Trouble seeing in one or both eyes. Trouble walking, dizziness, loss of balance or coordination. Severe headache.
Render Risk Assessment In Note?: no
Additional Notes: Discussed avoiding picking and rubbing in the area.  Plan to biopsy if it is larger or unresolved.
Detail Level: Simple

## (undated) DEVICE — GLOVE PROTEXIS MICRO 6.5  2D73PM65

## (undated) DEVICE — PAD PERI INDIV WRAP 11" 2022A

## (undated) DEVICE — PACK MINOR LITHOTOMY RIDGES

## (undated) DEVICE — SOL NACL 0.9% IRRIG 3000ML BAG 2B7477

## (undated) DEVICE — DRAPE UNDER BUTTOCK 89415

## (undated) DEVICE — PREP TECHNI-CARE CHLOROXYLENOL 3% 4OZ BOTTLE C222-4ZWO

## (undated) DEVICE — LINEN FULL SHEET 5511

## (undated) DEVICE — SOL NACL 0.9% IRRIG 1000ML BOTTLE 2F7124

## (undated) DEVICE — SOL WATER IRRIG 1000ML BOTTLE 2F7114

## (undated) DEVICE — LINEN HALF SHEET 5512

## (undated) DEVICE — BASIN SET MINOR DISP

## (undated) DEVICE — TUBING SYS AQUILEX BLUE INFLOW AQL-110 YLW OUTFLOW AQL-111

## (undated) DEVICE — BAG CLEAR TRASH 1.3M 39X33" P4040C

## (undated) DEVICE — PAD CHUX UNDERPAD 30X36" P3036C

## (undated) DEVICE — SEAL SET MYOSURE ROD LENS SCOPE SINGLE USE 40-902

## (undated) DEVICE — SUCTION CANISTER BEMIS HI FLOW 006772-901

## (undated) DEVICE — CATH INTERMITTENT CLEAN-CATH FEMALE 14FR 6" VINYL LF 420614

## (undated) DEVICE — KIT ENDO TURNOVER/PROCEDURE W/CLEAN A SCOPE LINERS 103888

## (undated) DEVICE — SPECIMEN BAG BEMIS HI FLOW SUCTION WHITE SOCK 533810

## (undated) RX ORDER — FENTANYL CITRATE 50 UG/ML
INJECTION, SOLUTION INTRAMUSCULAR; INTRAVENOUS
Status: DISPENSED
Start: 2018-05-18

## (undated) RX ORDER — EPHEDRINE SULFATE 50 MG/ML
INJECTION, SOLUTION INTRAMUSCULAR; INTRAVENOUS; SUBCUTANEOUS
Status: DISPENSED
Start: 2018-05-18

## (undated) RX ORDER — LIDOCAINE HYDROCHLORIDE 10 MG/ML
INJECTION, SOLUTION EPIDURAL; INFILTRATION; INTRACAUDAL; PERINEURAL
Status: DISPENSED
Start: 2018-05-18

## (undated) RX ORDER — ONDANSETRON 2 MG/ML
INJECTION INTRAMUSCULAR; INTRAVENOUS
Status: DISPENSED
Start: 2018-05-18

## (undated) RX ORDER — GLYCOPYRROLATE 0.2 MG/ML
INJECTION INTRAMUSCULAR; INTRAVENOUS
Status: DISPENSED
Start: 2018-05-18

## (undated) RX ORDER — FENTANYL CITRATE 50 UG/ML
INJECTION, SOLUTION INTRAMUSCULAR; INTRAVENOUS
Status: DISPENSED
Start: 2019-03-11

## (undated) RX ORDER — ACETAMINOPHEN 325 MG/1
TABLET ORAL
Status: DISPENSED
Start: 2018-05-18

## (undated) RX ORDER — PROPOFOL 10 MG/ML
INJECTION, EMULSION INTRAVENOUS
Status: DISPENSED
Start: 2018-05-18

## (undated) RX ORDER — DEXAMETHASONE SODIUM PHOSPHATE 4 MG/ML
INJECTION, SOLUTION INTRA-ARTICULAR; INTRALESIONAL; INTRAMUSCULAR; INTRAVENOUS; SOFT TISSUE
Status: DISPENSED
Start: 2018-05-18